# Patient Record
Sex: MALE | Race: WHITE | NOT HISPANIC OR LATINO | Employment: OTHER | URBAN - METROPOLITAN AREA
[De-identification: names, ages, dates, MRNs, and addresses within clinical notes are randomized per-mention and may not be internally consistent; named-entity substitution may affect disease eponyms.]

---

## 2017-01-30 RX ORDER — WARFARIN SODIUM 5 MG/1
TABLET ORAL
Qty: 90 TABLET | Refills: 0 | Status: SHIPPED | OUTPATIENT
Start: 2017-01-30 | End: 2017-05-01 | Stop reason: SDUPTHER

## 2017-04-30 RX ORDER — WARFARIN SODIUM 5 MG/1
TABLET ORAL
Qty: 90 TABLET | Refills: 0 | Status: CANCELLED | OUTPATIENT
Start: 2017-04-30

## 2017-05-01 RX ORDER — WARFARIN SODIUM 5 MG/1
TABLET ORAL
Qty: 90 TABLET | Refills: 0 | Status: SHIPPED | OUTPATIENT
Start: 2017-05-01 | End: 2017-07-31 | Stop reason: SDUPTHER

## 2017-06-09 ENCOUNTER — TRANSCRIBE ORDERS (OUTPATIENT)
Dept: ADMINISTRATIVE | Facility: HOSPITAL | Age: 77
End: 2017-06-09

## 2017-06-09 DIAGNOSIS — R10.9 FLANK PAIN: ICD-10-CM

## 2017-06-09 DIAGNOSIS — R63.4 LOSS OF WEIGHT: Primary | ICD-10-CM

## 2017-06-19 ENCOUNTER — HOSPITAL ENCOUNTER (OUTPATIENT)
Dept: CT IMAGING | Facility: HOSPITAL | Age: 77
Discharge: HOME | End: 2017-06-19
Admitting: FAMILY MEDICINE

## 2017-06-19 DIAGNOSIS — R63.4 LOSS OF WEIGHT: ICD-10-CM

## 2017-06-19 DIAGNOSIS — R10.9 FLANK PAIN: ICD-10-CM

## 2017-06-19 PROCEDURE — 74176 CT ABD & PELVIS W/O CONTRAST: CPT

## 2017-07-05 ENCOUNTER — TELEPHONE (OUTPATIENT)
Dept: CARDIOLOGY | Facility: CLINIC | Age: 77
End: 2017-07-05

## 2017-07-05 NOTE — TELEPHONE ENCOUNTER
When I called for pt's INR dosage his wife had a question about his warfarin. He will be having a colonoscopy coming up on 8/1/17 with Dr. Bergman at [facility]. She wanted to know about holding his warfarin and whether he need ed to bridge with Lovenox this time. She can be reached at #[phone]. Please advise.  Thanks,  Ines

## 2017-07-06 NOTE — TELEPHONE ENCOUNTER
Stop warfarin 5 days ahead and start lovenox 2 days prior to procedure if INR is <2 - pls call - must stay on Lovenox until INR is 2-3.

## 2017-07-07 NOTE — TELEPHONE ENCOUNTER
Called and left a message advising. JOSÉ. I told her to call back to make sure she understood.    Ines

## 2017-07-13 ENCOUNTER — TELEPHONE (OUTPATIENT)
Dept: CARDIOLOGY | Facility: CLINIC | Age: 77
End: 2017-07-13

## 2017-07-13 NOTE — TELEPHONE ENCOUNTER
----- Message from Donya Gagnon MD sent at 7/13/2017  3:34 PM EDT -----  Spoke with Dr. Bergman and he can do colonoscopy on coumadin - no need to reverse.  Maybe have him hold the day prior to the procedure.     RM

## 2017-07-13 NOTE — TELEPHONE ENCOUNTER
S/w pt's wife they rather want the pt to get admit to have the bridge/heparin in the hospital. They really can't afford the co py for lovenox. Can you put the order in......      Thanks  Nirmala PARIKH

## 2017-07-31 RX ORDER — WARFARIN SODIUM 5 MG/1
TABLET ORAL
Qty: 90 TABLET | Refills: 0 | Status: SHIPPED | OUTPATIENT
Start: 2017-07-31 | End: 2017-08-14

## 2017-08-14 RX ORDER — WARFARIN SODIUM 5 MG/1
5 TABLET ORAL
COMMUNITY
End: 2017-11-02 | Stop reason: SDUPTHER

## 2017-08-14 RX ORDER — ASCORBIC ACID 500 MG
1000 TABLET ORAL AS NEEDED
COMMUNITY
End: 2018-11-21 | Stop reason: ALTCHOICE

## 2017-08-15 ENCOUNTER — ANESTHESIA (OUTPATIENT)
Dept: GASTROENTEROLOGY | Facility: HOSPITAL | Age: 77
End: 2017-08-15

## 2017-08-15 ENCOUNTER — HOSPITAL ENCOUNTER (OUTPATIENT)
Facility: HOSPITAL | Age: 77
Setting detail: HOSPITAL OUTPATIENT SURGERY
Discharge: HOME | End: 2017-08-15
Attending: SURGERY | Admitting: SURGERY

## 2017-08-15 ENCOUNTER — ANESTHESIA EVENT (OUTPATIENT)
Dept: GASTROENTEROLOGY | Facility: HOSPITAL | Age: 77
End: 2017-08-15

## 2017-08-15 VITALS
BODY MASS INDEX: 20.47 KG/M2 | WEIGHT: 146.19 LBS | DIASTOLIC BLOOD PRESSURE: 80 MMHG | HEIGHT: 71 IN | OXYGEN SATURATION: 100 % | SYSTOLIC BLOOD PRESSURE: 124 MMHG | RESPIRATION RATE: 18 BRPM | HEART RATE: 64 BPM | TEMPERATURE: 98.4 F

## 2017-08-15 PROCEDURE — 25010000002 PROPOFOL 10 MG/ML EMULSION: Performed by: ANESTHESIOLOGY

## 2017-08-15 RX ORDER — SODIUM CHLORIDE, SODIUM LACTATE, POTASSIUM CHLORIDE, CALCIUM CHLORIDE 600; 310; 30; 20 MG/100ML; MG/100ML; MG/100ML; MG/100ML
1000 INJECTION, SOLUTION INTRAVENOUS CONTINUOUS PRN
Status: DISCONTINUED | OUTPATIENT
Start: 2017-08-15 | End: 2017-08-15 | Stop reason: HOSPADM

## 2017-08-15 RX ORDER — PROPOFOL 10 MG/ML
VIAL (ML) INTRAVENOUS AS NEEDED
Status: DISCONTINUED | OUTPATIENT
Start: 2017-08-15 | End: 2017-08-15 | Stop reason: SURG

## 2017-08-15 RX ORDER — PROPOFOL 10 MG/ML
VIAL (ML) INTRAVENOUS CONTINUOUS PRN
Status: DISCONTINUED | OUTPATIENT
Start: 2017-08-15 | End: 2017-08-15 | Stop reason: SURG

## 2017-08-15 RX ORDER — LIDOCAINE HYDROCHLORIDE 20 MG/ML
INJECTION, SOLUTION INFILTRATION; PERINEURAL AS NEEDED
Status: DISCONTINUED | OUTPATIENT
Start: 2017-08-15 | End: 2017-08-15 | Stop reason: SURG

## 2017-08-15 RX ADMIN — PROPOFOL 50 MG: 10 INJECTION, EMULSION INTRAVENOUS at 10:46

## 2017-08-15 RX ADMIN — SODIUM CHLORIDE, POTASSIUM CHLORIDE, SODIUM LACTATE AND CALCIUM CHLORIDE: 600; 310; 30; 20 INJECTION, SOLUTION INTRAVENOUS at 10:45

## 2017-08-15 RX ADMIN — PROPOFOL 100 MCG/KG/MIN: 10 INJECTION, EMULSION INTRAVENOUS at 10:46

## 2017-08-15 RX ADMIN — SODIUM CHLORIDE, POTASSIUM CHLORIDE, SODIUM LACTATE AND CALCIUM CHLORIDE 1000 ML: 600; 310; 30; 20 INJECTION, SOLUTION INTRAVENOUS at 10:29

## 2017-08-15 RX ADMIN — LIDOCAINE HYDROCHLORIDE 60 MG: 20 INJECTION, SOLUTION INFILTRATION; PERINEURAL at 10:45

## 2017-08-15 NOTE — DISCHARGE INSTRUCTIONS
For the next 24 hours patient needs to be with a responsible adult.    For 24 hours DO NOT drive, operate machinery, appliances, drink alcohol, make important decisions or sign legal documents.    Start with a light or bland diet and advance to regular diet as tolerated.    Follow recommendations on procedure report provided by your doctor.    Call Dr guerrero* for problems [phone]    Problems may include but not limited to: large amounts of bleeding, trouble breathing, repeated vomiting, severe unrelieved pain, fever or chills.

## 2017-08-15 NOTE — OP NOTE
Procedure Note    Bobby L Tapley  1940  [id number]  8/15/2017  8/15/2017      Pre-operative Diagnosis: abnormal CT    Post-operative Diagnosis: internal hemorrhoids    Procedure: Colonoscopy   Surgeon:  Hu Bergman M.D,  [email or web address].    Sedation: MAC    Procedure Details:  Informed consent was obtained for the procedure, including sedation.  Risks of perforation, hemorrhage, adverse drug reaction and aspiration were discussed.The patient was monitored continuously with ECG tracing, pulse oximetry, blood pressure monitoring, and direct observations. The patient was placed in the left lateral decubitus position. The patient was given intravenous sedation by anesthesia.     A digital rectal examination was performed.  The variable-stiffness pediatric colonoscope was inserted into the rectum and advanced under direct vision to the cecum, which was identified by the ileocecal valve and appendiceal orifice.  The quality of the colonic preparation was good. A retroflexed view of the right colon was normal.     Careful inspection was made as the colonoscope was withdrawn, including a retroflexed view of the rectum; findings and interventions are described below.  Digital exam of the rectum was performed which revealed no abnormalities.     Colonoscopy findings:  1.  Diverticulosis  was mild  2.  Prominent internal hemorrhoids  3.  Normal appearing ascending colon    Specimens:      * No specimens in log *      Complications: none    Recommendations:  -Follow up with primary care physician.

## 2017-08-15 NOTE — ANESTHESIA POSTPROCEDURE EVALUATION
"Patient: Bobby L Tapley    Procedure Summary     Date Anesthesia Start Anesthesia Stop Room / Location    08/15/17 1043 1113  [facility]       Procedure Diagnosis Surgeon Provider    COLONOSCOPY (N/A ) No diagnosis on file. MD Medhat Cox MD          Anesthesia Type: MAC  Last vitals  BP   112/64 (08/15/17 1128)    Temp   36.9 °C (98.4 °F) (08/15/17 1108)    Pulse   66 (08/15/17 1128)   Resp   18 (08/15/17 1128)    SpO2   97 % (08/15/17 1128)      Post Anesthesia Care and Evaluation    Patient location during evaluation: PACU  Patient participation: complete - patient participated  Level of consciousness: awake  Pain score: 0  Pain management: adequate  Airway patency: patent  Anesthetic complications: No anesthetic complications    Cardiovascular status: acceptable  Respiratory status: acceptable  Hydration status: acceptable    Comments: Blood pressure 112/64, pulse 66, temperature 36.9 °C (98.4 °F), temperature source Oral, resp. rate 18, height 71\" (180.3 cm), weight 146 lb 3 oz (66.3 kg), SpO2 97 %.        "

## 2017-08-15 NOTE — ANESTHESIA PREPROCEDURE EVALUATION
Anesthesia Evaluation     Patient summary reviewed and Nursing notes reviewed          Airway   Mallampati: I  TM distance: <3 FB  Neck ROM: full  no difficulty expected  Dental - normal exam     Pulmonary - negative pulmonary ROS and normal exam   Cardiovascular - normal exam    (+) valvular problems/murmurs, CAD, CABG, PVD, hyperlipidemia      Neuro/Psych- negative ROS  GI/Hepatic/Renal/Endo - negative ROS     Musculoskeletal     Abdominal  - normal exam    Bowel sounds: normal.   Substance History - negative use     OB/GYN negative ob/gyn ROS         Other   (+) arthritis                                     Anesthesia Plan    ASA 3     MAC     Anesthetic plan and risks discussed with patient.

## 2017-08-15 NOTE — H&P
Date: 8/15/2017     Patient: Bobby L Tapley    : 1940   [id number]       History:   The patient is a 77 y.o. male of Serenity Adam MD  who presents for colonoscopy. The patient currently has no complaints.  He  denies any history of nausea, abdominal pain, weight loss, change in bowel habits or rectal bleeding.   Family history is negative for for colon cancer or polyps.    He garcia had recent weight loss.  CT showed a thickened right colon     The patient has had a colonoscopy in the past- several years ago.      The following portions of the patient's history were reviewed and updated as appropriate: allergies, current medications, past family history, past medical history, past social history, past surgical history and problem list.    Past Medical History:   Diagnosis Date   • Aneurysm of aortic arch    • Aortic insufficiency    • Aortic stenosis    • Arthritis    • Ascending aortic aneurysm    • CAD (coronary artery disease)    • Enlarged prostate    • H/O aortic valve replacement    • H/O echocardiogram 02/10/2015   • Health care maintenance    • History of EKG 2015   • Hyperlipidemia    • Hypertension    • Kidney stones       Past Surgical History:   Procedure Laterality Date   • AORTIC VALVE REPAIR/REPLACEMENT     • CARDIAC CATHETERIZATION  2013   • CORONARY ARTERY BYPASS GRAFT     • ROTATOR CUFF REPAIR       Medications:   Prescriptions Prior to Admission   Medication Sig Dispense Refill Last Dose   • aspirin 81 MG tablet Take 81 mg by mouth Daily.   2017 at Unknown time   • enalapril (VASOTEC) 2.5 MG tablet Take 2.5 mg by mouth Daily.   2017 at Unknown time   • finasteride (PROSCAR) 5 MG tablet Take 5 mg by mouth Daily.   2017 at Unknown time   • HYDROcodone-acetaminophen (NORCO) 5-325 MG per tablet Take 1 tablet by mouth as needed.   2017 at Unknown time   • L-Lysine 500 MG capsule Take 1 capsule by mouth As Needed.   8/15/2017 at Unknown time   • Multiple  Vitamins-Minerals (CENTRUM SILVER) tablet Take 1 tablet by mouth Daily.   Past Week at Unknown time   • vitamin C (ASCORBIC ACID) 500 MG tablet Take 1,000 mg by mouth Daily.   Past Week at Unknown time   • warfarin (COUMADIN) 5 MG tablet Take 5 mg by mouth Daily.   8/13/2017      Allergies: Review of patient's allergies indicates no known allergies.   Social History:  Social History     Social History   • Marital status:      Spouse name: N/A   • Number of children: N/A   • Years of education: N/A     Social History Main Topics   • Smoking status: Never Smoker   • Smokeless tobacco: None      Comment: caffeine use   • Alcohol use No   • Drug use: No   • Sexual activity: Defer     Other Topics Concern   • None     Social History Narrative        Family History   Problem Relation Age of Onset   • Cancer Mother    • Aneurysm Father    • Heart disease Father    • Aneurysm Brother    • Heart disease Brother    • Malig Hyperthermia Neg Hx           Review of Systems:   Negative  ros    Physical Examination:  General - well developed  male in no distress.  HEENT - pupils are equal, conjunctiva are pink, sclera are non-icteric.  Mouth - mucous membranes are moist. Speech is normal with no hoarseness.  Neck - supple, no adenopathy or masses, no JVD.  Chest - clear.  Heart - regular rate and rhythm.  Abomen - soft, non-tender, non-distended, no masses or hernias.   Ext - no edema or cyanosis, capillary refill brisk.    Impression:  77 y.o. male for  colonoscopy.  Abnormal CT gi tract    Plan:  colonoscopy.  Generalities of the procedure were described.  Risks of bleeding and/or bowel perforation were discussed.      Signature: No name on file.     CC: Serenity Adam MD

## 2017-09-26 ENCOUNTER — OFFICE VISIT (OUTPATIENT)
Dept: CARDIOLOGY | Facility: CLINIC | Age: 77
End: 2017-09-26

## 2017-09-26 VITALS
BODY MASS INDEX: 21.14 KG/M2 | HEIGHT: 71 IN | SYSTOLIC BLOOD PRESSURE: 130 MMHG | HEART RATE: 70 BPM | DIASTOLIC BLOOD PRESSURE: 70 MMHG | WEIGHT: 151 LBS

## 2017-09-26 DIAGNOSIS — R06.83 SNORING: ICD-10-CM

## 2017-09-26 DIAGNOSIS — Z95.2 H/O AORTIC VALVE REPLACEMENT: ICD-10-CM

## 2017-09-26 DIAGNOSIS — I10 ESSENTIAL HYPERTENSION: ICD-10-CM

## 2017-09-26 DIAGNOSIS — I25.10 CORONARY ARTERY DISEASE INVOLVING NATIVE CORONARY ARTERY OF NATIVE HEART WITHOUT ANGINA PECTORIS: Primary | ICD-10-CM

## 2017-09-26 DIAGNOSIS — E78.5 HYPERLIPIDEMIA, UNSPECIFIED HYPERLIPIDEMIA TYPE: ICD-10-CM

## 2017-09-26 DIAGNOSIS — Z95.1 HX OF CABG: ICD-10-CM

## 2017-09-26 DIAGNOSIS — R53.83 OTHER FATIGUE: ICD-10-CM

## 2017-09-26 PROCEDURE — 93000 ELECTROCARDIOGRAM COMPLETE: CPT | Performed by: INTERNAL MEDICINE

## 2017-09-26 PROCEDURE — 99214 OFFICE O/P EST MOD 30 MIN: CPT | Performed by: INTERNAL MEDICINE

## 2017-09-26 NOTE — PROGRESS NOTES
Date of Office Visit: 2017  Encounter Provider: Donya Gagnon MD  Place of Service: [facility] CARDIOLOGY  Patient Name: Bobby L Tapley  :1940      Patient ID:  Bobby L Tapley is a 77 y.o. male is here for  followup for CAD and h/o mechanical AVR.         History of Present Illness     In  he underwent replacement of his aortic valve, getting a mechanical aortic valve.   He also received saphenous vein graft to LAD, replacement of the ascending and transverse  aortic arch with reimplantation of the coronary arteries onto the arch. I believe this  was all due to coronary stenosis. He had a CT of the chest performed in 2013 which  showed chronic aortic dissection as described by radiology.       He had an echocardiogram which was done in 2013 which revealed a normally functioning  mechanical aortic valve. His ejection fraction was 60% with normal LV size and no LVH.   He had a cardiac catheterization which was done in 2012 which revealed a patent LAD,  patent left main, patent circumflex vessel, patent RCA and occluded saphenous vein graft  to LAD. I do have his stress nuclear perfusion study which was performed in 2012 prior  to the catheterization which showed a small amount of inferior wall ischemia but no ST  changes on that. I do have his operative report from [facility] dated 2004. What  they found intraoperatively was 50-60% mid LAD stenosis, moderately severe aortic valve  stenosis, calcified bileaflet aortic valve and a 6 cm ascending aortic aneurysm as well as  dilation of the transverse thoracic aorta. He underwent a Bentall procedure and received  a #23 Carbomedics aortic valve with conduit. He had 30 mm woven Hemashield gold graft  used to replace the transverse thoracic aorta. The internal mammary artery was not used  because it was small on inspection so he received a saphenous vein graft to the LAD. This  procedure was done by  Dr. Santana Walls.         He had a 2-D echocardiogram with Doppler done in 02/10/2015, showing an ejection fraction  of 65%, septal hypokinesis, mechanical type prosthetic valve, which was normal and trace  tricuspid insufficiency.       He has had increasing fatigue.  He snores heavily and his wife says she witnesses him not breathing at night.  He's had no chest pain but has some mild exertional dyspnea.  He's also recently had some gastroenteritis and is recovering from this.  He has no fevers.  He's had no tachycardia, dizziness or syncope.  He is taking his medications as directed.    Past Medical History:   Diagnosis Date   • Aneurysm of aortic arch    • Aortic insufficiency    • Aortic stenosis    • Arthritis    • Ascending aortic aneurysm    • CAD (coronary artery disease)    • Enlarged prostate    • H/O aortic valve replacement    • H/O echocardiogram 02/10/2015   • Health care maintenance    • History of EKG 07/07/2015   • Hyperlipidemia    • Hypertension    • Kidney stones          Past Surgical History:   Procedure Laterality Date   • AORTIC VALVE REPAIR/REPLACEMENT     • CARDIAC CATHETERIZATION  11/27/2013   • COLONOSCOPY N/A 8/15/2017    Procedure: COLONOSCOPY;  Surgeon: Hu Bergman MD;  Location: [facility] ENDOSCOPY;  Service:    • CORONARY ARTERY BYPASS GRAFT     • ROTATOR CUFF REPAIR         Current Outpatient Prescriptions on File Prior to Visit   Medication Sig Dispense Refill   • aspirin 81 MG tablet Take 81 mg by mouth Daily.     • enalapril (VASOTEC) 2.5 MG tablet Take 2.5 mg by mouth Daily.     • finasteride (PROSCAR) 5 MG tablet Take 5 mg by mouth Daily.     • HYDROcodone-acetaminophen (NORCO) 5-325 MG per tablet Take 1 tablet by mouth as needed.     • L-Lysine 500 MG capsule Take 1 capsule by mouth As Needed.     • Multiple Vitamins-Minerals (CENTRUM SILVER) tablet Take 1 tablet by mouth Daily.     • vitamin C (ASCORBIC ACID) 500 MG tablet Take 1,000 mg by mouth Daily.     • warfarin  "(COUMADIN) 5 MG tablet Take 5 mg by mouth Daily.       No current facility-administered medications on file prior to visit.        Social History     Social History   • Marital status:      Spouse name: N/A   • Number of children: N/A   • Years of education: N/A     Occupational History   • Not on file.     Social History Main Topics   • Smoking status: Never Smoker   • Smokeless tobacco: Never Used   • Alcohol use No      Comment: caffeine use   • Drug use: No   • Sexual activity: Defer     Other Topics Concern   • Not on file     Social History Narrative           Review of Systems   Constitution: Negative.   HENT: Negative for congestion.    Eyes: Negative for vision loss in left eye and vision loss in right eye.   Respiratory: Negative.  Negative for cough, hemoptysis, shortness of breath, sleep disturbances due to breathing, snoring, sputum production and wheezing.    Endocrine: Negative.    Hematologic/Lymphatic: Negative.    Skin: Negative for poor wound healing and rash.   Musculoskeletal: Negative for falls, gout, muscle cramps and myalgias.   Gastrointestinal: Negative for abdominal pain, diarrhea, dysphagia, hematemesis, melena, nausea and vomiting.   Neurological: Negative for excessive daytime sleepiness, dizziness, headaches, light-headedness, loss of balance, seizures and vertigo.   Psychiatric/Behavioral: Negative for depression and substance abuse. The patient is not nervous/anxious.        Procedures    ECG 12 Lead  Date/Time: 9/26/2017 10:51 AM  Performed by: RICHA MARRUFO  Authorized by: RICHA MARRUFO   Comparison: compared with previous ECG   Rhythm: sinus rhythm  Conduction: LAFB  Other findings: PRWP  Clinical impression: abnormal ECG               Objective:      Vitals:    09/26/17 1045   BP: 130/70   BP Location: Right arm   Patient Position: Sitting   Pulse: 70   Weight: 151 lb (68.5 kg)   Height: 71\" (180.3 cm)     Body mass index is 21.06 kg/(m^2).    Physical Exam "   Constitutional: He is oriented to person, place, and time. He appears well-developed and well-nourished. No distress.   HENT:   Head: Normocephalic and atraumatic.   Eyes: Conjunctivae are normal. No scleral icterus.   Neck: Neck supple. No JVD present. Carotid bruit is not present. No thyromegaly present.   Cardiovascular: Normal rate, regular rhythm, S1 normal, S2 normal, normal heart sounds and intact distal pulses.   No extrasystoles are present. PMI is not displaced.  Exam reveals no gallop.    No murmur heard.  Pulses:       Carotid pulses are 2+ on the right side, and 2+ on the left side.       Radial pulses are 2+ on the right side, and 2+ on the left side.        Dorsalis pedis pulses are 2+ on the right side, and 2+ on the left side.        Posterior tibial pulses are 2+ on the right side, and 2+ on the left side.   Mechanical click   Pulmonary/Chest: Effort normal and breath sounds normal. No respiratory distress. He has no wheezes. He has no rhonchi. He has no rales. He exhibits no tenderness.   Abdominal: Soft. Bowel sounds are normal. He exhibits no distension, no abdominal bruit and no mass. There is no tenderness.   Musculoskeletal: He exhibits no edema or deformity.   Lymphadenopathy:     He has no cervical adenopathy.   Neurological: He is alert and oriented to person, place, and time. No cranial nerve deficit.   Skin: Skin is warm and dry. No rash noted. He is not diaphoretic. No cyanosis. No pallor. Nails show no clubbing.   Psychiatric: He has a normal mood and affect. Judgment normal.   Vitals reviewed.      Lab Review:       Assessment:      Diagnosis Plan   1. Coronary artery disease involving native coronary artery of native heart without angina pectoris     2. Hx of CABG     3. H/O aortic valve replacement     4. Essential hypertension     5. Hyperlipidemia, unspecified hyperlipidemia type       1. Status post mechanical aortic valve replacement for a bicuspid aortic valve with  severe  stenosis and large ascending aortic aneurysm. This was done in 02/2004. He received a 23  mm Carbomedics mechanical aortic valve with a conduit. His thoracic aorta was repaired  with 30 mm woven Hemashield gold graft and his ascending aortic aneurysm was also  repaired. He received a saphenous vein graft to LAD at that time. He then had a cardiac  catheterization in 2012 which revealed the saphenous vein graft to the LAD was occluded  but his coronary arteries were all patent. At this time he has no active angina. His echocardiogram from 2/2015, shows normal function of his  mechanical aortic valve.   2. Normal ejection fraction of 60%.   3. Hypertension under fair control.   4. Hyperlipidemia. The patient is on no statin therapy for this. His lipids are well controlled.   5. Renal calculi; stable.         Plan:       Refers sleep medicine for snoring and daytime somnolence.  Set up an echocardiogram for fatigue and exertional dyspnea.  No med changes, see back in one year.    Coronary Artery Disease  Assessment  • The patient has no angina    Subjective - Objective  • There is a history of previous coronary artery bypass graft  • Current antiplatelet therapy includes aspirin 81 mg

## 2017-10-04 ENCOUNTER — TELEPHONE (OUTPATIENT)
Dept: CARDIOLOGY | Facility: HOSPITAL | Age: 77
End: 2017-10-04

## 2017-10-09 ENCOUNTER — HOSPITAL ENCOUNTER (OUTPATIENT)
Dept: CARDIOLOGY | Facility: HOSPITAL | Age: 77
Discharge: HOME | End: 2017-10-09
Attending: INTERNAL MEDICINE | Admitting: INTERNAL MEDICINE

## 2017-10-09 VITALS
BODY MASS INDEX: 21.14 KG/M2 | WEIGHT: 151 LBS | SYSTOLIC BLOOD PRESSURE: 122 MMHG | DIASTOLIC BLOOD PRESSURE: 58 MMHG | HEIGHT: 71 IN | HEART RATE: 58 BPM | OXYGEN SATURATION: 95 %

## 2017-10-09 DIAGNOSIS — Z95.1 HX OF CABG: ICD-10-CM

## 2017-10-09 DIAGNOSIS — Z95.2 H/O AORTIC VALVE REPLACEMENT: ICD-10-CM

## 2017-10-09 DIAGNOSIS — I25.10 CORONARY ARTERY DISEASE INVOLVING NATIVE CORONARY ARTERY OF NATIVE HEART WITHOUT ANGINA PECTORIS: ICD-10-CM

## 2017-10-09 DIAGNOSIS — I71.21 ANEURYSM, ASCENDING AORTA (HCC): Primary | ICD-10-CM

## 2017-10-09 DIAGNOSIS — I71.21 ASCENDING AORTIC ANEURYSM (HCC): Primary | ICD-10-CM

## 2017-10-09 LAB
BH CV ECHO MEAS - AO ACC TIME: 0.08 SEC
BH CV ECHO MEAS - AO MAX PG (FULL): 10 MMHG
BH CV ECHO MEAS - AO MAX PG: 24.7 MMHG
BH CV ECHO MEAS - AO MEAN PG (FULL): 6.5 MMHG
BH CV ECHO MEAS - AO MEAN PG: 14.6 MMHG
BH CV ECHO MEAS - AO ROOT AREA (BSA CORRECTED): 2.2
BH CV ECHO MEAS - AO ROOT AREA: 12.8 CM^2
BH CV ECHO MEAS - AO ROOT DIAM: 4 CM
BH CV ECHO MEAS - AO V2 MAX: 248.4 CM/SEC
BH CV ECHO MEAS - AO V2 MEAN: 179 CM/SEC
BH CV ECHO MEAS - AO V2 VTI: 49.8 CM
BH CV ECHO MEAS - AVA(I,A): 1.1 CM^2
BH CV ECHO MEAS - AVA(I,D): 1.1 CM^2
BH CV ECHO MEAS - AVA(V,A): 1.1 CM^2
BH CV ECHO MEAS - AVA(V,D): 1.1 CM^2
BH CV ECHO MEAS - BSA(HAYCOCK): 1.8 M^2
BH CV ECHO MEAS - BSA: 1.9 M^2
BH CV ECHO MEAS - BZI_BMI: 20.9 KILOGRAMS/M^2
BH CV ECHO MEAS - BZI_METRIC_HEIGHT: 180.3 CM
BH CV ECHO MEAS - BZI_METRIC_WEIGHT: 68 KG
BH CV ECHO MEAS - CONTRAST EF (2CH): 60.8 ML/M^2
BH CV ECHO MEAS - CONTRAST EF 4CH: 62.3 ML/M^2
BH CV ECHO MEAS - EDV(MOD-SP2): 51 ML
BH CV ECHO MEAS - EDV(MOD-SP4): 77 ML
BH CV ECHO MEAS - EDV(TEICH): 81.4 ML
BH CV ECHO MEAS - EF(CUBED): 71.6 %
BH CV ECHO MEAS - EF(MOD-SP2): 60.8 %
BH CV ECHO MEAS - EF(MOD-SP4): 62.3 %
BH CV ECHO MEAS - EF(TEICH): 63.6 %
BH CV ECHO MEAS - ESV(MOD-SP2): 20 ML
BH CV ECHO MEAS - ESV(MOD-SP4): 29 ML
BH CV ECHO MEAS - ESV(TEICH): 29.7 ML
BH CV ECHO MEAS - FS: 34.2 %
BH CV ECHO MEAS - IVS/LVPW: 0.89
BH CV ECHO MEAS - IVSD: 0.96 CM
BH CV ECHO MEAS - LAT PEAK E' VEL: 9 CM/SEC
BH CV ECHO MEAS - LV DIASTOLIC VOL/BSA (35-75): 41.3 ML/M^2
BH CV ECHO MEAS - LV MASS(C)D: 144.1 GRAMS
BH CV ECHO MEAS - LV MASS(C)DI: 77.2 GRAMS/M^2
BH CV ECHO MEAS - LV MAX PG: 14.7 MMHG
BH CV ECHO MEAS - LV MEAN PG: 8 MMHG
BH CV ECHO MEAS - LV SYSTOLIC VOL/BSA (12-30): 15.5 ML/M^2
BH CV ECHO MEAS - LV V1 MAX: 191.5 CM/SEC
BH CV ECHO MEAS - LV V1 MEAN: 131.7 CM/SEC
BH CV ECHO MEAS - LV V1 VTI: 38.5 CM
BH CV ECHO MEAS - LVIDD: 4.3 CM
BH CV ECHO MEAS - LVIDS: 2.8 CM
BH CV ECHO MEAS - LVLD AP2: 6.4 CM
BH CV ECHO MEAS - LVLD AP4: 8.2 CM
BH CV ECHO MEAS - LVLS AP2: 5.9 CM
BH CV ECHO MEAS - LVLS AP4: 6.5 CM
BH CV ECHO MEAS - LVOT AREA (M): 1.5 CM^2
BH CV ECHO MEAS - LVOT AREA: 1.5 CM^2
BH CV ECHO MEAS - LVOT DIAM: 1.4 CM
BH CV ECHO MEAS - LVPWD: 1.1 CM
BH CV ECHO MEAS - MED PEAK E' VEL: 6 CM/SEC
BH CV ECHO MEAS - MV A DUR: 0.12 SEC
BH CV ECHO MEAS - MV A MAX VEL: 109.6 CM/SEC
BH CV ECHO MEAS - MV DEC SLOPE: 446.3 CM/SEC^2
BH CV ECHO MEAS - MV DEC TIME: 0.19 SEC
BH CV ECHO MEAS - MV E MAX VEL: 75.2 CM/SEC
BH CV ECHO MEAS - MV E/A: 0.69
BH CV ECHO MEAS - MV MAX PG: 5 MMHG
BH CV ECHO MEAS - MV MEAN PG: 1.9 MMHG
BH CV ECHO MEAS - MV P1/2T MAX VEL: 78.1 CM/SEC
BH CV ECHO MEAS - MV P1/2T: 51.2 MSEC
BH CV ECHO MEAS - MV V2 MAX: 111.3 CM/SEC
BH CV ECHO MEAS - MV V2 MEAN: 64 CM/SEC
BH CV ECHO MEAS - MV V2 VTI: 32 CM
BH CV ECHO MEAS - MVA P1/2T LCG: 2.8 CM^2
BH CV ECHO MEAS - MVA(P1/2T): 4.3 CM^2
BH CV ECHO MEAS - MVA(VTI): 1.7 CM^2
BH CV ECHO MEAS - PA ACC TIME: 0.13 SEC
BH CV ECHO MEAS - PA MAX PG (FULL): 3.1 MMHG
BH CV ECHO MEAS - PA MAX PG: 6.2 MMHG
BH CV ECHO MEAS - PA PR(ACCEL): 20.4 MMHG
BH CV ECHO MEAS - PA V2 MAX: 124.9 CM/SEC
BH CV ECHO MEAS - PULM A REVS DUR: 0.13 SEC
BH CV ECHO MEAS - PULM A REVS VEL: 34.6 CM/SEC
BH CV ECHO MEAS - PULM DIAS VEL: 39.6 CM/SEC
BH CV ECHO MEAS - PULM S/D: 1.1
BH CV ECHO MEAS - PULM SYS VEL: 45 CM/SEC
BH CV ECHO MEAS - PVA(V,A): 2.9 CM^2
BH CV ECHO MEAS - PVA(V,D): 2.9 CM^2
BH CV ECHO MEAS - QP/QS: 1.4
BH CV ECHO MEAS - RV MAX PG: 3.2 MMHG
BH CV ECHO MEAS - RV MEAN PG: 1.5 MMHG
BH CV ECHO MEAS - RV V1 MAX: 88.8 CM/SEC
BH CV ECHO MEAS - RV V1 MEAN: 56.5 CM/SEC
BH CV ECHO MEAS - RV V1 VTI: 18.7 CM
BH CV ECHO MEAS - RVOT AREA: 4.1 CM^2
BH CV ECHO MEAS - RVOT DIAM: 2.3 CM
BH CV ECHO MEAS - SI(AO): 340.8 ML/M^2
BH CV ECHO MEAS - SI(CUBED): 29.7 ML/M^2
BH CV ECHO MEAS - SI(LVOT): 30 ML/M^2
BH CV ECHO MEAS - SI(MOD-SP2): 16.6 ML/M^2
BH CV ECHO MEAS - SI(MOD-SP4): 25.7 ML/M^2
BH CV ECHO MEAS - SI(TEICH): 27.7 ML/M^2
BH CV ECHO MEAS - SV(AO): 636 ML
BH CV ECHO MEAS - SV(CUBED): 55.5 ML
BH CV ECHO MEAS - SV(LVOT): 56 ML
BH CV ECHO MEAS - SV(MOD-SP2): 31 ML
BH CV ECHO MEAS - SV(MOD-SP4): 48 ML
BH CV ECHO MEAS - SV(RVOT): 77.3 ML
BH CV ECHO MEAS - SV(TEICH): 51.8 ML
BH CV XLRA - RV BASE: 2.5 CM
BH CV XLRA - TDI S': 7 CM/SEC
E/E' RATIO: 11
LEFT ATRIUM VOLUME INDEX: 24 ML/M2
LEFT ATRIUM VOLUME: 45 CM3
LV EF 2D ECHO EST: 62 %

## 2017-10-09 PROCEDURE — 25010000002 PERFLUTREN (DEFINITY) 8.476 MG IN SODIUM CHLORIDE 0.9 % 10 ML INJECTION: Performed by: INTERNAL MEDICINE

## 2017-10-09 PROCEDURE — 93306 TTE W/DOPPLER COMPLETE: CPT

## 2017-10-09 PROCEDURE — 93306 TTE W/DOPPLER COMPLETE: CPT | Performed by: INTERNAL MEDICINE

## 2017-10-09 RX ADMIN — PERFLUTREN 1.5 ML: 6.52 INJECTION, SUSPENSION INTRAVENOUS at 08:33

## 2017-10-10 ENCOUNTER — TELEPHONE (OUTPATIENT)
Dept: CARDIAC SURGERY | Facility: CLINIC | Age: 77
End: 2017-10-10

## 2017-10-10 NOTE — TELEPHONE ENCOUNTER
Pt was referred by Dr. Gagnon for aneurysm. I called the pt to notify him that we cannot schedule him until his testing ordered by Dr. Gagnon is complete. His wife understood and verbalized that as soon as they have his testing scheduled, they will call the office back to schedule and appointment to see Dr. Cox.

## 2017-10-11 ENCOUNTER — TELEPHONE (OUTPATIENT)
Dept: CARDIOLOGY | Facility: CLINIC | Age: 77
End: 2017-10-11

## 2017-10-11 NOTE — TELEPHONE ENCOUNTER
Pt's wife is calling re about pt echo test last 10/9/17....she can be reached at [phone]....Nirmala PARIKH

## 2017-10-16 ENCOUNTER — HOSPITAL ENCOUNTER (OUTPATIENT)
Dept: CT IMAGING | Facility: HOSPITAL | Age: 77
Discharge: HOME | End: 2017-10-16
Attending: INTERNAL MEDICINE | Admitting: INTERNAL MEDICINE

## 2017-10-16 PROCEDURE — 0 IOPAMIDOL PER 1 ML: Performed by: INTERNAL MEDICINE

## 2017-10-16 PROCEDURE — 82565 ASSAY OF CREATININE: CPT

## 2017-10-16 PROCEDURE — 71275 CT ANGIOGRAPHY CHEST: CPT

## 2017-10-16 RX ADMIN — IOPAMIDOL 95 ML: 755 INJECTION, SOLUTION INTRAVENOUS at 07:30

## 2017-10-31 ENCOUNTER — OFFICE VISIT (OUTPATIENT)
Dept: CARDIAC SURGERY | Facility: CLINIC | Age: 77
End: 2017-10-31

## 2017-10-31 VITALS
HEART RATE: 71 BPM | OXYGEN SATURATION: 94 % | RESPIRATION RATE: 20 BRPM | DIASTOLIC BLOOD PRESSURE: 70 MMHG | WEIGHT: 148 LBS | HEIGHT: 71 IN | SYSTOLIC BLOOD PRESSURE: 112 MMHG | BODY MASS INDEX: 20.72 KG/M2 | TEMPERATURE: 98.3 F

## 2017-10-31 DIAGNOSIS — Z95.1 HX OF CABG: Primary | ICD-10-CM

## 2017-10-31 DIAGNOSIS — Z95.2 H/O AORTIC VALVE REPLACEMENT: ICD-10-CM

## 2017-10-31 DIAGNOSIS — Z98.890 S/P ASCENDING AORTIC ANEURYSM REPAIR: ICD-10-CM

## 2017-10-31 DIAGNOSIS — I10 ESSENTIAL HYPERTENSION: ICD-10-CM

## 2017-10-31 DIAGNOSIS — I25.10 CORONARY ARTERY DISEASE INVOLVING NATIVE CORONARY ARTERY OF NATIVE HEART WITHOUT ANGINA PECTORIS: ICD-10-CM

## 2017-10-31 DIAGNOSIS — Z86.79 S/P ASCENDING AORTIC ANEURYSM REPAIR: ICD-10-CM

## 2017-10-31 PROCEDURE — 99204 OFFICE O/P NEW MOD 45 MIN: CPT | Performed by: THORACIC SURGERY (CARDIOTHORACIC VASCULAR SURGERY)

## 2017-10-31 RX ORDER — ENALAPRIL MALEATE 2.5 MG/1
2.5 TABLET ORAL DAILY
COMMUNITY
Start: 2017-09-27 | End: 2021-10-12 | Stop reason: HOSPADM

## 2017-10-31 RX ORDER — HYDROCODONE BITARTRATE AND ACETAMINOPHEN 5; 325 MG/1; MG/1
1 TABLET ORAL
COMMUNITY
Start: 2017-10-20 | End: 2017-10-31 | Stop reason: SDUPTHER

## 2017-10-31 NOTE — PROGRESS NOTES
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 He is a 77 y.o. male with a history of 6 cm ascending aortic aneurysm who underwent a multivessel CABG, mechanical AV conduit, and ascending aorta and tiffani-arch replacement with a graft in the year 2004 by Dr. 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 He denies any chest or upper back pain, dyspnea, orthopnea, TIA, embolic events, stroke, hoarseness or anticoagulation related problems he had a recent echocardiogram that showed a normal functioning aortic valve, mechanical type and good ejection fraction and no other problems. His chest CT showed showed a 4.7 cm in my measurements 4.4 cm mid aortic arch aneurysm at the level of the tiffani-arch anastomosis, no false aneurysm, tapering to a mild dilatation in the proximal descending thoracic aorta and am not sure there is a true dissection in the aortic arch patch visit is one in my opinion is a chronic one.  He has done very well with anticoagulation and reports no complications    Patient Active Problem List   Diagnosis   • H/O aortic valve replacement   • BP (high blood pressure)   • HLD (hyperlipidemia)   • Coronary artery disease involving native coronary artery of native heart without angina pectoris   • Hx of CABG   • S/P ascending aortic aneurysm repair       Past Medical History:   Diagnosis Date   • Aneurysm of aortic arch    • Aortic insufficiency    • Aortic stenosis    • Arthritis    • Ascending aortic aneurysm    • CAD (coronary artery disease)    • Enlarged prostate    • H/O aortic valve replacement    • H/O echocardiogram 02/10/2015   • Health care  maintenance    • History of EKG 07/07/2015   • Hyperlipidemia    • Hypertension    • Kidney stones        Past Surgical History:   Procedure Laterality Date   • AORTIC VALVE REPAIR/REPLACEMENT     • CARDIAC CATHETERIZATION  11/27/2013   • COLONOSCOPY N/A 8/15/2017    Procedure: COLONOSCOPY;  Surgeon: Hu Bergman MD;  Location: [facility] ENDOSCOPY;  Service:    • CORONARY ARTERY BYPASS GRAFT     • ROTATOR CUFF REPAIR         No Known Allergies      Current Outpatient Prescriptions:   •  aspirin 81 MG tablet, Take 81 mg by mouth Daily., Disp: , Rfl:   •  enalapril (VASOTEC) 2.5 MG tablet, Take 2.5 mg by mouth., Disp: , Rfl:   •  finasteride (PROSCAR) 5 MG tablet, Take 5 mg by mouth Daily., Disp: , Rfl:   •  HYDROcodone-acetaminophen (NORCO) 5-325 MG per tablet, Take 1 tablet by mouth as needed., Disp: , Rfl:   •  L-Lysine 500 MG capsule, Take 1 capsule by mouth As Needed., Disp: , Rfl:   •  Multiple Vitamins-Minerals (CENTRUM SILVER) tablet, Take 1 tablet by mouth Daily., Disp: , Rfl:   •  vitamin C (ASCORBIC ACID) 500 MG tablet, Take 1,000 mg by mouth Daily., Disp: , Rfl:   •  warfarin (COUMADIN) 5 MG tablet, Take 5 mg by mouth Daily., Disp: , Rfl:     Social History     Social History   • Marital status:      Spouse name: N/A   • Number of children: N/A   • Years of education: N/A     Occupational History   • Not on file.     Social History Main Topics   • Smoking status: Never Smoker   • Smokeless tobacco: Never Used   • Alcohol use No      Comment: caffeine use   • Drug use: No   • Sexual activity: Defer     Other Topics Concern   • Not on file     Social History Narrative       Family History   Problem Relation Age of Onset   • Cancer Mother    • Aneurysm Father    • Heart disease Father    • Aneurysm Brother    • Heart disease Brother    • Malig Hyperthermia Neg Hx      Review of Systems   Constitutional: Negative for fatigue.   Gastrointestinal: Negative for anal bleeding and blood in stool.    Genitourinary: Negative for dysuria, flank pain and hematuria.   Neurological: Negative for dizziness, syncope and weakness.   All other systems reviewed and are negative.         Physical Exam:    Vital Signs:  Weight: 148 lb (67.1 kg)   Body mass index is 20.64 kg/(m^2).  Temp: 98.3 °F (36.8 °C)   Heart Rate: 71   BP: 112/70     Physical Exam   Constitutional: He is oriented to person, place, and time. He appears well-developed and well-nourished.   HENT:   Head: Normocephalic and atraumatic.   Nose: Nose normal.   Mouth/Throat: Oropharynx is clear and moist.   Eyes: Conjunctivae are normal. Pupils are equal, round, and reactive to light.   Neck: Normal range of motion. Neck supple. No JVD present. No thyromegaly present.   Cardiovascular: Normal rate, regular rhythm, S1 normal, S2 normal and intact distal pulses.  Exam reveals no gallop and no friction rub.    Murmur heard.  Pulses:       Radial pulses are 2+ on the right side, and 2+ on the left side.        Dorsalis pedis pulses are 2+ on the right side, and 2+ on the left side.   Mechanical aortic sounds, 2/6 systolic murmur in aortic area   Pulmonary/Chest: Effort normal and breath sounds normal. He has no rales.   Abdominal: Soft. Bowel sounds are normal. He exhibits no distension and no mass. There is no tenderness.   Musculoskeletal: Normal range of motion. He exhibits no tenderness or deformity.   Lymphadenopathy:     He has no cervical adenopathy.   Neurological: He is alert and oriented to person, place, and time. He has normal reflexes.   Skin: Skin is warm and dry. No rash noted. No erythema.   Psychiatric: He has a normal mood and affect. His behavior is normal.    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    Relevant studies (other than HPI)        Assessment:     Problem List Items Addressed This Visit        Cardiovascular and Mediastinum    BP (high blood pressure)    Relevant Medications    enalapril  (VASOTEC) 2.5 MG tablet    Coronary artery disease involving native coronary artery of native heart without angina pectoris       Other    H/O aortic valve replacement    Hx of CABG - Primary    S/P ascending aortic aneurysm repair        Moderate size residual aortic arch and proximal descending thoracic aorta aneurysm/asymptomatic  A status post proximal aortic replacement and aortic repair  Hypertension is well controlled with meds  Anticoagulation therapy well adjusted    Recommendation/Plan:     I recommend a chest CTA and office visit in one year and a strict blood pressure control.  I discussed with him that if starting chest pain or other problem arises he should be evaluated in the ER at [facility]    Thank you for allowing me to participate in his care.    Regards,    Mendez Cox M.D.

## 2017-11-02 RX ORDER — WARFARIN SODIUM 5 MG/1
TABLET ORAL
Qty: 90 TABLET | Refills: 0 | Status: SHIPPED | OUTPATIENT
Start: 2017-11-02 | End: 2018-01-31 | Stop reason: SDUPTHER

## 2017-11-09 LAB — CREAT BLDA-MCNC: 0.7 MG/DL (ref 0.6–1.3)

## 2017-12-04 ENCOUNTER — OFFICE VISIT (OUTPATIENT)
Dept: SLEEP MEDICINE | Facility: HOSPITAL | Age: 77
End: 2017-12-04
Attending: INTERNAL MEDICINE

## 2017-12-04 VITALS
HEIGHT: 69 IN | WEIGHT: 152 LBS | DIASTOLIC BLOOD PRESSURE: 70 MMHG | HEART RATE: 78 BPM | SYSTOLIC BLOOD PRESSURE: 119 MMHG | BODY MASS INDEX: 22.51 KG/M2

## 2017-12-04 DIAGNOSIS — R63.4 WEIGHT LOSS: ICD-10-CM

## 2017-12-04 DIAGNOSIS — R06.81 WITNESSED EPISODE OF APNEA: ICD-10-CM

## 2017-12-04 DIAGNOSIS — G47.30 HYPERSOMNIA WITH SLEEP APNEA: Primary | ICD-10-CM

## 2017-12-04 DIAGNOSIS — I25.10 CORONARY ARTERY DISEASE INVOLVING NATIVE CORONARY ARTERY OF NATIVE HEART WITHOUT ANGINA PECTORIS: ICD-10-CM

## 2017-12-04 DIAGNOSIS — R06.83 SNORING: ICD-10-CM

## 2017-12-04 DIAGNOSIS — Z95.2 H/O AORTIC VALVE REPLACEMENT: ICD-10-CM

## 2017-12-04 DIAGNOSIS — G47.33 OSA (OBSTRUCTIVE SLEEP APNEA): ICD-10-CM

## 2017-12-04 DIAGNOSIS — G47.10 HYPERSOMNIA WITH SLEEP APNEA: Primary | ICD-10-CM

## 2017-12-04 PROBLEM — N40.0 BPH (BENIGN PROSTATIC HYPERPLASIA): Status: ACTIVE | Noted: 2017-12-04

## 2017-12-04 PROBLEM — E78.5 DYSLIPIDEMIA: Status: ACTIVE | Noted: 2017-12-04

## 2017-12-04 PROBLEM — G56.00 CTS (CARPAL TUNNEL SYNDROME): Status: ACTIVE | Noted: 2017-12-04

## 2017-12-04 PROBLEM — H91.90 HIGH FREQUENCY HEARING LOSS: Status: ACTIVE | Noted: 2017-12-04

## 2017-12-04 PROBLEM — M19.90 OSTEOARTHRITIS: Status: ACTIVE | Noted: 2017-12-04

## 2017-12-04 PROBLEM — N20.0 NEPHROLITHIASIS: Status: ACTIVE | Noted: 2017-12-04

## 2017-12-04 PROBLEM — G89.29 CHRONIC HEADACHES: Status: ACTIVE | Noted: 2017-12-04

## 2017-12-04 PROBLEM — R51.9 CHRONIC HEADACHES: Status: ACTIVE | Noted: 2017-12-04

## 2017-12-04 PROCEDURE — G0463 HOSPITAL OUTPT CLINIC VISIT: HCPCS

## 2017-12-04 NOTE — PROGRESS NOTES
Sleep Consultation    Patient Name: Bobby L Tapley  Age/Sex: 77 y.o. male  : 1940  MRN: [id number]    Date of Encounter Visit: 2017  Encounter Provider: Reji Blanco MD  Referring Provider: Donya Gagnon MD  Place of Service: [facility]  Patient Care Team:  Serenity GERMAN MD as PCP - General (Family Medicine)    Subjective:     Reason for Consult: snoring and witness apnea events    History of Present Illness:  Bobby L Tapley is a 77 y.o. male is here for evaluation of JOY. He was being evaluated for unexplained weight loss and his wife reported that he snores and has had witness apnea events at night. He has non-refreshing sleep and excessive daytime sleepiness with nocturia. He had an aortic valve replacement with a [place] on anticoagulation and post operatively he developed some dysphagia and may have some esophageal stenosis. He has lost 20 pounds in 1 year    Please see sleep questionnaire on page 2 for more details.   Patient complains of daytime fatigue and sleepiness with an [place] Sleepiness Scale (ESS) of 12.  Patient complains of snoring loudly and has had witnessed apnea events by his wife. He does have complaints of waking up gasping for breath at night, coughing, choking or with respiratory discomfort. He has morning headaches and awakens with sore throat or dry mouth.    He has typical symptoms of restless legs with complaints of leg jerking and a creepy crawling discomfort that improves with movement. The pain is bothersome while trying to go to sleep.   Patient denies any cataplexy, sleep paralysis or other symptoms to suggest narcolepsy. However, he does complain of sudden episodes of sleep during the day.   Patient denies any parasomnias.  Denies any history of seizure disorder or recent head trauma.  He has problems falling asleep and staying asleep at night with overall restless sleep.  Patient spends adequate amount of time in bed with no  evidence of sleep restriction or improper sleep hygiene. He goes to bed at 12 or 1 am and wakes up at 6 am averaging 4-5 hours of sleep per night. He reports falling asleep within 30 minutes and wakes up feeling dizzy at times. He takes 3-4 naps per day.   Comorbidities include: hypertension, hyperlipidemia, CAD s/p CABG and mechanical AVR on anticoagulation.    Review of Systems:   Positive for Frequent urination, ear pain, nasal congestion, postnasal drip, painful joints or muscles, poor appetite, shortness of breath, depression, problems swallowing, changes in nails and always too warm.  A twelve-system review was conducted and was otherwise negative.   Please refer to page 4 of on the sleep questionnaire for more details on system review findings.    Past Medical History:  Past Medical History:   Diagnosis Date   • Aneurysm of aortic arch    • Aortic insufficiency    • Aortic stenosis    • Arthritis    • Ascending aortic aneurysm    • CAD (coronary artery disease)    • Enlarged prostate    • H/O aortic valve replacement    • H/O echocardiogram 02/10/2015   • Health care maintenance    • History of EKG 07/07/2015   • Hyperlipidemia    • Hypertension    • Kidney stones        Past Surgical History:   Procedure Laterality Date   • AORTIC VALVE REPAIR/REPLACEMENT     • CARDIAC CATHETERIZATION  11/27/2013   • COLONOSCOPY N/A 8/15/2017    Procedure: COLONOSCOPY;  Surgeon: Hu Bergman MD;  Location: [facility];  Service:    • CORONARY ARTERY BYPASS GRAFT     • ROTATOR CUFF REPAIR         Home Medications:     Current Outpatient Prescriptions:   •  aspirin 81 MG tablet, Take 81 mg by mouth Daily., Disp: , Rfl:   •  enalapril (VASOTEC) 2.5 MG tablet, Take 2.5 mg by mouth., Disp: , Rfl:   •  finasteride (PROSCAR) 5 MG tablet, Take 5 mg by mouth Daily., Disp: , Rfl:   •  HYDROcodone-acetaminophen (NORCO) 5-325 MG per tablet, Take 1 tablet by mouth as needed., Disp: , Rfl:   •  L-Lysine 500 MG capsule, Take 1  "capsule by mouth As Needed., Disp: , Rfl:   •  Multiple Vitamins-Minerals (CENTRUM SILVER) tablet, Take 1 tablet by mouth Daily., Disp: , Rfl:   •  vitamin C (ASCORBIC ACID) 500 MG tablet, Take 1,000 mg by mouth Daily., Disp: , Rfl:   •  warfarin (COUMADIN) 5 MG tablet, TAKE 1 TABLET BY MOUTH DAILY OR AS DIRECTED, Disp: 90 tablet, Rfl: 0    Allergies:  No Known Allergies    Past Social History:  Social History     Social History   • Marital status:      Spouse name: N/A   • Number of children: N/A   • Years of education: N/A     Occupational History   • retired      Social History Main Topics   • Smoking status: Never Smoker   • Smokeless tobacco: Never Used   • Alcohol use No      Comment: caffeine use   • Drug use: No   • Sexual activity: Defer     Other Topics Concern   • None     Social History Narrative       Past Family History:  Family History   Problem Relation Age of Onset   • Cancer Mother    • Aneurysm Father    • Heart disease Father    • Aneurysm Brother    • Heart disease Brother    • Hypertension Cousin    • Obesity Cousin    • Lung cancer Cousin    • Malig Hyperthermia Neg Hx          Objective:   Done and documented on sleep disorders center physical examination sheet, please refer to the hand written note on records for details about the pertinent negative findings  Vital Signs:   Visit Vitals   • /70 (BP Location: Left arm, Patient Position: Sitting)   • Pulse 78   • Ht 175.3 cm (69\")   • Wt 68.9 kg (152 lb)   • BMI 22.45 kg/m2     Wt Readings from Last 3 Encounters:   12/04/17 68.9 kg (152 lb)   10/31/17 67.1 kg (148 lb)   10/09/17 68.5 kg (151 lb)     Neck Circumference: 15.5 inches    Physical Exam:   General: AAOx3. Normal mood and affect.   HEENT:  Conjunctiva normal.  PERRLA.  Moist mucous membranes.  Septum midline. Mallampati 2 airway. normal tongue. Mildly enlarged uvula.    Neck: Neck supple. Trachea midline.  Normal thyroid.  LUNGS: Non-labored breathing. CTAB.  No " wheezing.  HEART: regular rate and rhythm. Positive early systolic 2/6 murmur and S2 metallic click.  EXTREMITIES: no edema. No cyanosis or clubbing. Normal gait.    Diagnostic Data:  No prior testing.    Assessment and Plan:       ICD-10-CM ICD-9-CM   1. Hypersomnia with sleep apnea G47.10 780.53    G47.30    2. JOY (obstructive sleep apnea) G47.33 327.23   3. Weight loss R63.4 783.21   4. Snoring R06.83 786.09   5. Witnessed episode of apnea R06.81 786.03   6. Coronary artery disease involving native coronary artery of native heart without angina pectoris I25.10 414.01   7. H/O aortic valve replacement Z95.2 V43.3       Recommendations:     Patient has typical symptoms of obstructive sleep apnea with witnessed apneic events, fatigue and excessive daytime sleepiness. Given the typical symptoms and comorbidities patient requires further evaluation of possible underlying obstructive sleep apnea.    He does have weight loss with history of esophageal stenosis per wife and would consider GI evaluation for dysphagia with possible EGD or esophagram.    Patient was educated in depth about JOY and cardiovascular consequences if left untreated, including but not limited to CHF, CAD, arrhythmias, CVA, and/or HTN. Education also provided about the diagnostic tools for JOY, including the polysomnography and the treatment modalities, including the CPAP. Adherence to the CPAP is a key factor in successful treatment of JOY and the patient was encouraged to contact us in case of problem with the CPAP or the mask that can limit the tolerance of the compliance with the therapy.    Perform an in lab sleep study with split night titration if AHI greater than or equal to 5. Will follow up post testing to discuss treatment options.     Orders Placed This Encounter   Procedures   • Polysomnography 4 or More Parameters With CPAP       Return for Follow up after study.    Reji Blanco MD   [facility]   12/28/17  4:14  AM    EMR Dragon/Transcription disclaimer:   Much of this encounter note is an electronic transcription/translation of spoken language to printed text. The electronic translation of spoken language may permit erroneous, or at times, nonsensical words or phrases to be inadvertently transcribed; Although I have reviewed the note for such errors, some may still exist.

## 2018-01-31 RX ORDER — WARFARIN SODIUM 5 MG/1
TABLET ORAL
Qty: 90 TABLET | Refills: 0 | Status: SHIPPED | OUTPATIENT
Start: 2018-01-31 | End: 2018-05-04 | Stop reason: SDUPTHER

## 2018-02-15 DIAGNOSIS — I71.21 ASCENDING AORTIC ANEURYSM (HCC): Primary | ICD-10-CM

## 2018-02-26 ENCOUNTER — HOSPITAL ENCOUNTER (OUTPATIENT)
Dept: SLEEP MEDICINE | Facility: HOSPITAL | Age: 78
Discharge: HOME | End: 2018-02-26
Attending: INTERNAL MEDICINE | Admitting: INTERNAL MEDICINE

## 2018-02-26 VITALS — BODY MASS INDEX: 22.51 KG/M2 | HEIGHT: 69 IN | WEIGHT: 152 LBS

## 2018-02-26 DIAGNOSIS — R06.83 SNORING: ICD-10-CM

## 2018-02-26 DIAGNOSIS — Z95.2 H/O AORTIC VALVE REPLACEMENT: ICD-10-CM

## 2018-02-26 DIAGNOSIS — G47.10 HYPERSOMNIA WITH SLEEP APNEA: ICD-10-CM

## 2018-02-26 DIAGNOSIS — G47.33 OSA (OBSTRUCTIVE SLEEP APNEA): ICD-10-CM

## 2018-02-26 DIAGNOSIS — G47.30 HYPERSOMNIA WITH SLEEP APNEA: ICD-10-CM

## 2018-02-26 DIAGNOSIS — R63.4 WEIGHT LOSS: ICD-10-CM

## 2018-02-26 DIAGNOSIS — R06.81 WITNESSED EPISODE OF APNEA: ICD-10-CM

## 2018-02-26 DIAGNOSIS — I25.10 CORONARY ARTERY DISEASE INVOLVING NATIVE CORONARY ARTERY OF NATIVE HEART WITHOUT ANGINA PECTORIS: ICD-10-CM

## 2018-02-26 PROCEDURE — 95810 POLYSOM 6/> YRS 4/> PARAM: CPT

## 2018-03-06 ENCOUNTER — TELEPHONE (OUTPATIENT)
Dept: SLEEP MEDICINE | Facility: HOSPITAL | Age: 78
End: 2018-03-06

## 2018-03-06 NOTE — TELEPHONE ENCOUNTER
Spoke with pt's wife.  They would like to see  before receiving pap equipment.  Scheduled f/u with dr Blanco

## 2018-03-12 ENCOUNTER — OFFICE VISIT (OUTPATIENT)
Dept: SLEEP MEDICINE | Facility: HOSPITAL | Age: 78
End: 2018-03-12
Attending: INTERNAL MEDICINE

## 2018-03-12 VITALS
SYSTOLIC BLOOD PRESSURE: 135 MMHG | WEIGHT: 149 LBS | DIASTOLIC BLOOD PRESSURE: 79 MMHG | BODY MASS INDEX: 20.86 KG/M2 | HEIGHT: 71 IN | HEART RATE: 90 BPM

## 2018-03-12 DIAGNOSIS — Z95.1 HX OF CABG: ICD-10-CM

## 2018-03-12 DIAGNOSIS — G47.33 OSA (OBSTRUCTIVE SLEEP APNEA): Primary | ICD-10-CM

## 2018-03-12 DIAGNOSIS — I10 BENIGN ESSENTIAL HTN: ICD-10-CM

## 2018-03-12 DIAGNOSIS — G47.34 SLEEP RELATED HYPOXIA: ICD-10-CM

## 2018-03-12 DIAGNOSIS — G47.30 HYPERSOMNIA WITH SLEEP APNEA: ICD-10-CM

## 2018-03-12 DIAGNOSIS — Z95.2 H/O AORTIC VALVE REPLACEMENT: ICD-10-CM

## 2018-03-12 DIAGNOSIS — G47.10 HYPERSOMNIA WITH SLEEP APNEA: ICD-10-CM

## 2018-03-12 PROCEDURE — G0463 HOSPITAL OUTPT CLINIC VISIT: HCPCS

## 2018-03-12 RX ORDER — FINASTERIDE 5 MG/1
5 TABLET, FILM COATED ORAL DAILY
COMMUNITY
Start: 2017-12-19

## 2018-03-12 RX ORDER — HYDROCODONE BITARTRATE AND ACETAMINOPHEN 5; 325 MG/1; MG/1
1 TABLET ORAL
COMMUNITY
Start: 2018-02-19 | End: 2018-11-21 | Stop reason: SDUPTHER

## 2018-03-13 ENCOUNTER — TELEPHONE (OUTPATIENT)
Dept: SLEEP MEDICINE | Facility: HOSPITAL | Age: 78
End: 2018-03-13

## 2018-03-13 NOTE — TELEPHONE ENCOUNTER
Tech called pt and informed sent DME order to [facility]. Pt answered and confirmed and confirmed F/U appt that was already scheduled. MAB

## 2018-03-13 NOTE — PROGRESS NOTES
Sleep Disorder Follow Up    Patient Name: Bobby L Tapley  Age/Sex: 78 y.o. male  : 1940  MRN: [id number]    Date of Encounter Visit:    Referring Provider: Reji Blanco MD  Place of Service: [facility]  Patient Care Team:  Serenity GERMNA MD as PCP - General (Family Medicine)    PROBLEM LIST:  1. Moderate JOY   2. Sleep related hypoxia  3. Hx of CAD s/p CABG   4. HTN  5. Valvular heart disease s/p AVR  6. Dysphagia   7. Weight loss  8. Nocturia    History of Present Illness:  Bobby L Tapley is a 78 y.o. male who was last seen in the office on 17 for initial evaluation for suspected JOY with symptoms of snoring, witness apneas, non-refreshing sleep, nocturia and excessive daytime sleepiness. He had a sleep study on 18 that showed mild to moderate JOY with an overall AHI of 14.3 and RDI 22.3. Patient had mild sleep related hypoxia with mckinley desaturation of 78.9% with 8.9 minute spent at less than 89%.   Since last visit, patient continues to have symptoms, but wanted to discuss results before starting treatment. Auto CPAP 6-20 cmH20 was recommended.   [place] Sleepiness Scale (ESS) is 10.  Weight is down 3 pounds since last visit.  Other comorbidities include hypertension, hyperlipidemia, CAD s/p CABG and mechanical AVR on anticoagulation.    Review of Systems:   A ten-system review was conducted and was negative except for depression and shortness of breath.   Please refer to the follow up sleep questionnaire page one for details.    Past Medical History:  Past medical, surgical, social, and family history, except as mentioned above, was unchanged from the last visit.     Past Medical History:   Diagnosis Date   • Aneurysm of aortic arch    • Aortic insufficiency    • Aortic stenosis    • Arthritis    • Ascending aortic aneurysm    • CAD (coronary artery disease)    • Enlarged prostate    • H/O aortic valve replacement    • H/O echocardiogram  02/10/2015   • Health care maintenance    • History of EKG 07/07/2015   • Hyperlipidemia    • Hypertension    • Kidney stones        Past Surgical History:   Procedure Laterality Date   • AORTIC VALVE REPAIR/REPLACEMENT     • CARDIAC CATHETERIZATION  11/27/2013   • COLONOSCOPY N/A 8/15/2017    Procedure: COLONOSCOPY;  Surgeon: Hu Bergman MD;  Location: [facility] ENDOSCOPY;  Service:    • CORONARY ARTERY BYPASS GRAFT     • ROTATOR CUFF REPAIR         Home Medications:     Current Outpatient Prescriptions:   •  finasteride (PROSCAR) 5 MG tablet, Take 5 mg by mouth., Disp: , Rfl:   •  HYDROcodone-acetaminophen (NORCO) 5-325 MG per tablet, Take 1 tablet by mouth., Disp: , Rfl:   •  aspirin 81 MG tablet, Take 81 mg by mouth Daily., Disp: , Rfl:   •  enalapril (VASOTEC) 2.5 MG tablet, Take 2.5 mg by mouth., Disp: , Rfl:   •  HYDROcodone-acetaminophen (NORCO) 5-325 MG per tablet, Take 1 tablet by mouth as needed., Disp: , Rfl:   •  L-Lysine 500 MG capsule, Take 1 capsule by mouth As Needed., Disp: , Rfl:   •  Multiple Vitamins-Minerals (CENTRUM SILVER) tablet, Take 1 tablet by mouth Daily., Disp: , Rfl:   •  vitamin C (ASCORBIC ACID) 500 MG tablet, Take 1,000 mg by mouth Daily., Disp: , Rfl:   •  warfarin (COUMADIN) 5 MG tablet, TAKE ONE TABLET BY MOUTH DAILY OR AS DIRECTED, Disp: 90 tablet, Rfl: 0    Allergies:  No Known Allergies    Past Social History:  Social History     Social History   • Marital status:      Occupational History   • retired      Social History Main Topics   • Smoking status: Never Smoker   • Smokeless tobacco: Never Used   • Alcohol use No      Comment: caffeine use   • Drug use: No   • Sexual activity: Defer     Other Topics Concern   • Not on file       Past Family History:  Family History   Problem Relation Age of Onset   • Cancer Mother    • Aneurysm Father    • Heart disease Father    • Aneurysm Brother    • Heart disease Brother    • Hypertension Cousin    • Obesity Cousin    • Lung  "cancer Cousin    • Malmadelin Hyperthermia Neg Hx          Objective:   Done and documented on sleep disorders center physical examination sheet, please refer to hand written note on the chart for details about the other pertinent negative findings.    Vital Signs:   Visit Vitals  /79   Pulse 90   Ht 180.3 cm (71\")   Wt 67.6 kg (149 lb)   BMI 20.78 kg/m²     Wt Readings from Last 3 Encounters:   03/12/18 67.6 kg (149 lb)   02/26/18 68.9 kg (152 lb)   12/04/17 68.9 kg (152 lb)          Physical Exam:   General: AAOx3. Normal mood and affect.   HEENT:  Moist mucous membranes.  Septum midline. Mallampati 2 airway.    LUNGS: Non-labored breathing. CTAB. No wheezes.  HEART: Regular rate and rhythm. 2/6 murmur. S2 click.  EXTREMITIES: no edema. No cyanosis or clubbing. Normal gait    Diagnostic Data:  Polysomnography on 12/4/17 showed mild to moderate JOY with overall AHI of 14.3 and RDI of 22.3. Chad desaturation of 78.9% with 8.9 minutes spent below 89%. Arousal index 32.3.  Latency to sleep onset was 11.2 minutes and latency to REM was 71.5 minutes.  Patient had 3 cycles of REM non-REM sleep that were  by a long stretch of WASO. no significant REM component, patient was in the supine position throughout. Severe sleep fragmentation with arousal index of 32.3 however most arousals were spontaneous  No significant periodic limb movement disorder. Recommended trial of auto CPAP 6-20 cmH20.       Assessment and Plan:       ICD-10-CM ICD-9-CM   1. JOY (obstructive sleep apnea) G47.33 327.23   2. Sleep related hypoxia G47.34 327.24   3. Hypersomnia with sleep apnea G47.10 780.53    G47.30    4. Hx of CABG Z95.1 V45.81   5. H/O aortic valve replacement Z95.2 V43.3   6. Benign essential HTN I10 401.1       Recommendations:     Reviewed sleep study results with patient in detail. He has a mild to moderate JOY with spontaneous arousals as well. Patient was educated about treatment modalities for mild to moderate JOY " and was recommended to trial CPAP first and patient is agreeable.     Will start auto CPAP 6-20 cmH20 and follow up with compliance download in 2 months.     Patient was educated in depth about JOY and cardiovascular consequences if left untreated, including but not limited to CHF, CAD, arrhythmias, CVA, and/or HTN. Adherence to the CPAP is a key factor in successful treatment of JOY and the patient was encouraged to contact us in case of problem with the CPAP or the mask that can limit the tolerance of the compliance with the therapy.     No orders of the defined types were placed in this encounter.    Return in about 2 months (around 5/12/2018).    Reji Blanco MD   [facility]   03/16/18  10:11 AM    Dictated utilizing Dragon dictation:  Much of this encounter note is an electronic transcription/translation of spoken language to printed text. The electronic translation of spoken language may permit erroneous, or at times, nonsensical words or phrases to be inadvertently transcribed; Although I have reviewed the note for such errors, some may still exist.

## 2018-05-07 RX ORDER — WARFARIN SODIUM 5 MG/1
TABLET ORAL
Qty: 90 TABLET | Refills: 0 | Status: SHIPPED | OUTPATIENT
Start: 2018-05-07 | End: 2018-07-31 | Stop reason: SDUPTHER

## 2018-05-14 ENCOUNTER — OFFICE VISIT (OUTPATIENT)
Dept: SLEEP MEDICINE | Facility: HOSPITAL | Age: 78
End: 2018-05-14
Attending: INTERNAL MEDICINE

## 2018-05-14 VITALS
BODY MASS INDEX: 20.18 KG/M2 | SYSTOLIC BLOOD PRESSURE: 117 MMHG | OXYGEN SATURATION: 94 % | WEIGHT: 149 LBS | HEART RATE: 57 BPM | HEIGHT: 72 IN | DIASTOLIC BLOOD PRESSURE: 75 MMHG

## 2018-05-14 DIAGNOSIS — Z99.89 OSA ON CPAP: Primary | ICD-10-CM

## 2018-05-14 DIAGNOSIS — G47.30 HYPERSOMNIA WITH SLEEP APNEA: ICD-10-CM

## 2018-05-14 DIAGNOSIS — G47.10 HYPERSOMNIA WITH SLEEP APNEA: ICD-10-CM

## 2018-05-14 DIAGNOSIS — G47.33 OSA ON CPAP: Primary | ICD-10-CM

## 2018-05-14 DIAGNOSIS — G47.34 SLEEP RELATED HYPOXIA: ICD-10-CM

## 2018-05-14 DIAGNOSIS — I25.10 CORONARY ARTERY DISEASE INVOLVING NATIVE CORONARY ARTERY OF NATIVE HEART WITHOUT ANGINA PECTORIS: ICD-10-CM

## 2018-05-14 PROBLEM — D17.30 SUBCUTANEOUS LIPOMA: Status: ACTIVE | Noted: 2018-03-27

## 2018-05-14 PROBLEM — F43.21 SITUATIONAL DEPRESSION: Status: ACTIVE | Noted: 2018-03-13

## 2018-05-14 PROCEDURE — G0463 HOSPITAL OUTPT CLINIC VISIT: HCPCS

## 2018-05-14 RX ORDER — MIRTAZAPINE 15 MG/1
15 TABLET, FILM COATED ORAL
COMMUNITY
Start: 2018-04-13 | End: 2018-11-21 | Stop reason: ALTCHOICE

## 2018-05-14 RX ORDER — MIRTAZAPINE 15 MG/1
TABLET, FILM COATED ORAL
COMMUNITY
Start: 2018-04-13 | End: 2018-05-14

## 2018-05-14 NOTE — PROGRESS NOTES
Sleep Disorder Follow Up    Patient Name: Bobby L Tapley  Age/Sex: 78 y.o. male  : 1940  MRN: [id number]    Date of Encounter Visit: 18   Referring Provider: Reji Blanco MD  Place of Service: [facility]  Patient Care Team:  Serenity GERMAN MD as PCP - General (Family Medicine)    PROBLEM LIST:  1. Moderate Obstructive sleep apnea  2. Sleep related hypoxia  3. Hx of CAD s/p CABG   4. HTN  5. Valvular heart disease s/p AVR  6. Dysphagia   7. Weight loss  8. Nocturia    History of Present Illness:  Bobby L Tapley is a 78 y.o. male who was last seen in the office on 3/12/18 for a history of moderate JOY, sleep related hypoxia, CAD, valvular heart disease and hypertension. He had a sleep study on 18 that showed mild to moderate JOY with an overall AHI of 14.3 and RDI 22.3. Patient had mild sleep related hypoxia with mckinley desaturation of 78.9% with 8.9 minute spent at less than 89%. He was started on auto CPAP 6-20 cmH20.     He is here for his initial compliance review after starting CPAP therapy. Since last visit, he reports he is going to sleep much quicker, but wakes up to go to the bathroom and keeps machine off after that.   Patient is on CPAP and uses it every night with no complaints from the mask or the pressure.  Patient uses a nasal pillow mask, which does fit well. Patient denies any air leak or dry mouth.   Patient's equipment supplier is [employer].  Patient sleeps better and has a deeper sleep with the CPAP and feels more energy during the day time.  Current CPAP setting 6-20 cm H20.  [place] Sleepiness Scale (ESS) is 11 up from 10 at last visit.  Weight is stable since last visit.  Other comorbidities include hypertension, hyperlipidemia, CAD s/p CABG and mechanical AVR on anticoagulation.    Review of Systems:   A ten-system review was conducted and was negative.  Please refer to the follow up sleep questionnaire page one for details.    Past Medical  History:  Past medical, surgical, social, and family history, except as mentioned above, was unchanged from the last visit.     Past Medical History:   Diagnosis Date   • Aneurysm of aortic arch    • Aortic insufficiency    • Aortic stenosis    • Arthritis    • Ascending aortic aneurysm    • CAD (coronary artery disease)    • Enlarged prostate    • H/O aortic valve replacement    • H/O echocardiogram 02/10/2015   • Health care maintenance    • History of EKG 07/07/2015   • Hyperlipidemia    • Hypertension    • Kidney stones        Past Surgical History:   Procedure Laterality Date   • AORTIC VALVE REPAIR/REPLACEMENT     • CARDIAC CATHETERIZATION  11/27/2013   • COLONOSCOPY N/A 8/15/2017    Procedure: COLONOSCOPY;  Surgeon: Hu Bergman MD;  Location: [facility] ENDOSCOPY;  Service:    • CORONARY ARTERY BYPASS GRAFT     • ROTATOR CUFF REPAIR         Home Medications:     Current Outpatient Prescriptions:   •  mirtazapine (REMERON) 15 MG tablet, Take 15 mg by mouth., Disp: , Rfl:   •  aspirin 81 MG tablet, Take 81 mg by mouth Daily., Disp: , Rfl:   •  enalapril (VASOTEC) 2.5 MG tablet, Take 2.5 mg by mouth., Disp: , Rfl:   •  finasteride (PROSCAR) 5 MG tablet, Take 5 mg by mouth., Disp: , Rfl:   •  HYDROcodone-acetaminophen (NORCO) 5-325 MG per tablet, Take 1 tablet by mouth as needed., Disp: , Rfl:   •  HYDROcodone-acetaminophen (NORCO) 5-325 MG per tablet, Take 1 tablet by mouth., Disp: , Rfl:   •  L-Lysine 500 MG capsule, Take 1 capsule by mouth As Needed., Disp: , Rfl:   •  Multiple Vitamins-Minerals (CENTRUM SILVER) tablet, Take 1 tablet by mouth Daily., Disp: , Rfl:   •  vitamin C (ASCORBIC ACID) 500 MG tablet, Take 1,000 mg by mouth Daily., Disp: , Rfl:   •  warfarin (COUMADIN) 5 MG tablet, TAKE ONE TABLET BY MOUTH DAILY OR AS DIRECTED, Disp: 90 tablet, Rfl: 0    Allergies:  No Known Allergies    Past Social History:  Social History     Social History   • Marital status:      Occupational History   •  "retired      Social History Main Topics   • Smoking status: Never Smoker   • Smokeless tobacco: Never Used   • Alcohol use No      Comment: caffeine use   • Drug use: No   • Sexual activity: Defer     Other Topics Concern   • Not on file       Past Family History:  Family History   Problem Relation Age of Onset   • Cancer Mother    • Aneurysm Father    • Heart disease Father    • Aneurysm Brother    • Heart disease Brother    • Hypertension Cousin    • Obesity Cousin    • Lung cancer Cousin    • Malig Hyperthermia Neg Hx          Objective:   Done and documented on sleep disorders center physical examination sheet, please refer to hand written note on the chart for details about the other pertinent negative findings.    Vital Signs:   Visit Vitals  /75 (BP Location: Left arm, Patient Position: Sitting)   Pulse 57   Ht 181.6 cm (71.5\")   Wt 67.6 kg (149 lb)   SpO2 94%   BMI 20.49 kg/m²     Wt Readings from Last 3 Encounters:   05/14/18 67.6 kg (149 lb)   03/12/18 67.6 kg (149 lb)   02/26/18 68.9 kg (152 lb)     Neck Circumference: 14 inches    Physical Exam:   General: AAOx3. Normal mood and affect.   HEENT:  Moist mucous membranes.  Septum midline. Mallampati 2 airway.    LUNGS: Non-labored breathing. CTAB. No wheezes.  HEART: Regular rate and rhythm. 2/6 murmur. Loud S2 click.  EXTREMITIES: no edema. No cyanosis or clubbing. Normal gait    Diagnostic Data:  Polysomnography on 12/4/17 showed mild to moderate JOY with overall AHI of 14.3 and RDI of 22.3. Chad desaturation of 78.9% with 8.9 minutes spent below 89%. Arousal index 32.3.  Latency to sleep onset was 11.2 minutes and latency to REM was 71.5 minutes.  Patient had 3 cycles of REM non-REM sleep that were  by a long stretch of WASO. no significant REM component, patient was in the supine position throughout. Severe sleep fragmentation with arousal index of 32.3 however most arousals were spontaneous.No significant periodic limb movement " disorder. Started on auto CPAP 6-20 cmH20.     Compliance download from 4/14/18 to 5/13/18 showed overall compliance of 86.7% with only 40% spent at greater than 4 hours use per night with average use of 3 hours and 39 minutes on the nights used.  Residual AHI 4.6 on auto CPAP 6-20 cm H2O with 1 minute and 30 seconds every time spent with large air leak.  Majority of the time (90%) pressures staying at her below 7.0 cm H2O with a mean pressure of 6.0 centimeters H2O.    Assessment and Plan:       ICD-10-CM ICD-9-CM   1. JOY on CPAP G47.33 327.23    Z99.89 V46.8   2. Hypersomnia with sleep apnea G47.10 780.53    G47.30    3. Sleep related hypoxia G47.34 327.24   4. Coronary artery disease involving native coronary artery of native heart without angina pectoris I25.10 414.01       Recommendations:   Patient is consistent with the use of the CPAP most of the night, but is unable to keep it on all not long mainly because of his nocturia.    Proper instruction and advice were provided to help the patient overcome this obstacle and we did discuss the requirement from Medicare standpoint in order to be able to continue to use his CPAP and have it covered under Medicare.    Patient has no significant problem with obesity so weight loss is not at goal.  Re-education provided about the impact of untreated sleep apnea, it is slightly frustrating since he does not feel any better on the CPAP, but he has not been consistent with that yet to get to the point.    will follow-up in 4 weeks. no adjustment on the pressure needed at this point and we will continue with the CPAP and with the same mask with the instruction to keep the mask on at night when he uses the bathroom and disconnected from the CPAP tube.    Further recommendation will depend on the compliance download on the response to the treatment on the follow-up reevaluation      No orders of the defined types were placed in this encounter.    Return in about 4 weeks  (around 6/11/2018).    Reji Blanco MD   [facility]   05/19/18  7:52 PM    Dictated utilizing Dragon dictation:  Much of this encounter note is an electronic transcription/translation of spoken language to printed text. The electronic translation of spoken language may permit erroneous, or at times, nonsensical words or phrases to be inadvertently transcribed; Although I have reviewed the note for such errors, some may still exist.

## 2018-06-11 ENCOUNTER — OFFICE VISIT (OUTPATIENT)
Dept: SLEEP MEDICINE | Facility: HOSPITAL | Age: 78
End: 2018-06-11
Attending: INTERNAL MEDICINE

## 2018-06-11 VITALS
SYSTOLIC BLOOD PRESSURE: 119 MMHG | BODY MASS INDEX: 20.86 KG/M2 | HEIGHT: 71 IN | WEIGHT: 149 LBS | DIASTOLIC BLOOD PRESSURE: 77 MMHG | HEART RATE: 91 BPM

## 2018-06-11 DIAGNOSIS — I10 BENIGN ESSENTIAL HTN: ICD-10-CM

## 2018-06-11 DIAGNOSIS — Z95.2 MECHANICAL HEART VALVE PRESENT: ICD-10-CM

## 2018-06-11 DIAGNOSIS — G47.33 OSA ON CPAP: Primary | ICD-10-CM

## 2018-06-11 DIAGNOSIS — G47.34 SLEEP RELATED HYPOXIA: ICD-10-CM

## 2018-06-11 DIAGNOSIS — Z99.89 OSA ON CPAP: Primary | ICD-10-CM

## 2018-06-11 PROBLEM — M79.604 LEG PAIN, RIGHT: Status: ACTIVE | Noted: 2018-05-17

## 2018-06-11 PROCEDURE — G0463 HOSPITAL OUTPT CLINIC VISIT: HCPCS

## 2018-06-11 NOTE — PROGRESS NOTES
Sleep Disorder Follow Up    Patient Name: Bobby L Tapley  Age/Sex: 78 y.o. male  : 1940  MRN: [id number]    Date of Encounter Visit: 18   Referring Provider: Reji Blanco MD  Place of Service: [facility]  Patient Care Team:  Serenity GERMAN MD as PCP - General (Family Medicine)    PROBLEM LIST:  1. Moderate Obstructive sleep apnea  2. Sleep related hypoxia  3. Hx of CAD s/p CABG   4. HTN  5. Valvular heart disease s/p AVR  6. Dysphagia   7. Weight loss  8. Nocturia    History of Present Illness:  Bobby L Tapley is a 78 y.o. male who was last seen in the office on 18 for a history of moderate JOY, sleep related hypoxia, CAD, valvular heart disease and hypertension. He had a sleep study on 18 that showed mild to moderate JOY with an overall AHI of 14.3 and RDI 22.3. Patient had mild sleep related hypoxia with mckinley desaturation of 78.9% with 8.9 minute spent at less than 89%. He was started on auto CPAP 6-20 cmH20.      He was noncompliant on his initial compliance review mostly due to nocturia and taking off the mask when going to the bathroom then forgetting to put it back on.  He is here for compliance review.     Since last visit, he has done a better job about keeping the mask on when he goes to the bathroom and disconnecting the mask from hose.  His compliance has almost doubled.   Patient is on CPAP and uses it every night with no complaints from the mask or the pressure.  Patient uses a nasal pillow mask, which does fit well. Patient denies any air leak. Reports some dry mouth  Patient's equipment supplier is [employer] and last mask change was about 3 months ago.  Patient sleeps better and has a deeper sleep with the CPAP and feels more energy during the day time.  Current CPAP setting 6-20 cm H20.  [place] Sleepiness Scale (ESS) is 15 up from 11 at last visit.  Weight is stable since last visit.  Other comorbidities include hypertension, hyperlipidemia, CAD  s/p CABG and mechanical AVR on anticoagulation.    Review of Systems:   A ten-system review was conducted and was negative except for anxiety.  Please refer to the follow up sleep questionnaire page one for details.    Past Medical History:  Past medical, surgical, social, and family history, except as mentioned above, was unchanged from the last visit.     Past Medical History:   Diagnosis Date   • Aneurysm of aortic arch    • Aortic insufficiency    • Aortic stenosis    • Arthritis    • Ascending aortic aneurysm    • CAD (coronary artery disease)    • Enlarged prostate    • H/O aortic valve replacement    • H/O echocardiogram 02/10/2015   • Health care maintenance    • History of EKG 07/07/2015   • Hyperlipidemia    • Hypertension    • Kidney stones        Past Surgical History:   Procedure Laterality Date   • AORTIC VALVE REPAIR/REPLACEMENT     • CARDIAC CATHETERIZATION  11/27/2013   • COLONOSCOPY N/A 8/15/2017    Procedure: COLONOSCOPY;  Surgeon: Hu Bergman MD;  Location: [facility] ENDOSCOPY;  Service:    • CORONARY ARTERY BYPASS GRAFT     • ROTATOR CUFF REPAIR         Home Medications:     Current Outpatient Prescriptions:   •  aspirin 81 MG tablet, Take 81 mg by mouth Daily., Disp: , Rfl:   •  enalapril (VASOTEC) 2.5 MG tablet, Take 2.5 mg by mouth., Disp: , Rfl:   •  finasteride (PROSCAR) 5 MG tablet, Take 5 mg by mouth., Disp: , Rfl:   •  HYDROcodone-acetaminophen (NORCO) 5-325 MG per tablet, Take 1 tablet by mouth as needed., Disp: , Rfl:   •  HYDROcodone-acetaminophen (NORCO) 5-325 MG per tablet, Take 1 tablet by mouth., Disp: , Rfl:   •  L-Lysine 500 MG capsule, Take 1 capsule by mouth As Needed., Disp: , Rfl:   •  mirtazapine (REMERON) 15 MG tablet, Take 15 mg by mouth., Disp: , Rfl:   •  Multiple Vitamins-Minerals (CENTRUM SILVER) tablet, Take 1 tablet by mouth Daily., Disp: , Rfl:   •  vitamin C (ASCORBIC ACID) 500 MG tablet, Take 1,000 mg by mouth Daily., Disp: , Rfl:   •  warfarin (COUMADIN) 5 MG  "tablet, TAKE ONE TABLET BY MOUTH DAILY OR AS DIRECTED, Disp: 90 tablet, Rfl: 0    Allergies:  No Known Allergies    Past Social History:  Social History     Social History   • Marital status:      Occupational History   • retired      Social History Main Topics   • Smoking status: Never Smoker   • Smokeless tobacco: Never Used   • Alcohol use No      Comment: caffeine use   • Drug use: No   • Sexual activity: Defer     Other Topics Concern   • Not on file       Past Family History:  Family History   Problem Relation Age of Onset   • Cancer Mother    • Aneurysm Father    • Heart disease Father    • Aneurysm Brother    • Heart disease Brother    • Hypertension Cousin    • Obesity Cousin    • Lung cancer Cousin    • Malig Hyperthermia Neg Hx          Objective:   Done and documented on sleep disorders center physical examination sheet, please refer to hand written note on the chart for details about the other pertinent negative findings.    Vital Signs:   Visit Vitals  /77   Pulse 91   Ht 180.3 cm (71\")   Wt 67.6 kg (149 lb)   BMI 20.78 kg/m²     Wt Readings from Last 3 Encounters:   06/11/18 67.6 kg (149 lb)   05/14/18 67.6 kg (149 lb)   03/12/18 67.6 kg (149 lb)          Physical Exam:   General: AAOx3. Normal mood and affect.   HEENT:  Moist mucous membranes.  Septum midline. Mallampati 2 airway.    LUNGS: Non-labored breathing. CTAB. No wheezes.  HEART: Regular rate and rhythm. 2/6 murmur. Loud S2 click.  EXTREMITIES: no edema. No cyanosis or clubbing. Normal gait    Diagnostic Data:  Polysomnography on 12/4/17 showed mild to moderate JOY with overall AHI of 14.3 and RDI of 22.3. Chad desaturation of 78.9% with 8.9 minutes spent below 89%. Arousal index 32.3.  Latency to sleep onset was 11.2 minutes and latency to REM was 71.5 minutes.  Patient had 3 cycles of REM non-REM sleep that were  by a long stretch of WASO. no significant REM component, patient was in the supine position throughout. " Severe sleep fragmentation with arousal index of 32.3 however most arousals were spontaneous.No significant periodic limb movement disorder. Started on auto CPAP 6-20 cmH20.     Compliance download from 5/12/18 to 6/10/18 showed overall compliance of 100% with average use of 7 hours and 11 minutes.  Residual AHI is 7.3 (CA 0.3, OA 0.3, HI 6.7, RERA 0.9) on auto CPAP 6-20 cm H2O with mean pressure of 6.2 cm H2O and 90th percentile pressure of 7.2 cm H2O.  Average time spent with large air leak was 43 minutes and 14 seconds per day.    Assessment and Plan:       ICD-10-CM ICD-9-CM   1. JOY on CPAP G47.33 327.23    Z99.89 V46.8   2. Sleep related hypoxia G47.34 327.24   3. Benign essential HTN I10 401.1   4. Mechanical heart valve present Z95.2 V43.3       Recommendations:     Patient is compliant with the CPAP and he is using it every time and is getting adequate benefit from it and it is recommended to be continued.    He does have some mild residual sleep apnea with AHI of 7.3 given his previous issues with the tolerance,  I would continue with the CPAP at the same pressure for the time being and will consider increasing the pressure slightly more on the next visit.     Patient is to follow-up with me on a yearly basis from now on or sooner as needed.    No orders of the defined types were placed in this encounter.    Return in about 1 year (around 6/11/2019).    Reji Blanco MD   [facility]   06/11/18  1:07 PM    Dictated utilizing Dragon dictation:  Much of this encounter note is an electronic transcription/translation of spoken language to printed text. The electronic translation of spoken language may permit erroneous, or at times, nonsensical words or phrases to be inadvertently transcribed; Although I have reviewed the note for such errors, some may still exist.

## 2018-07-31 RX ORDER — WARFARIN SODIUM 5 MG/1
TABLET ORAL
Qty: 90 TABLET | Refills: 0 | Status: SHIPPED | OUTPATIENT
Start: 2018-07-31 | End: 2018-09-26 | Stop reason: SDUPTHER

## 2018-09-26 ENCOUNTER — OFFICE VISIT (OUTPATIENT)
Dept: CARDIOLOGY | Facility: CLINIC | Age: 78
End: 2018-09-26

## 2018-09-26 VITALS
HEART RATE: 62 BPM | WEIGHT: 146 LBS | HEIGHT: 71 IN | SYSTOLIC BLOOD PRESSURE: 110 MMHG | DIASTOLIC BLOOD PRESSURE: 72 MMHG | BODY MASS INDEX: 20.44 KG/M2

## 2018-09-26 DIAGNOSIS — Z86.79 S/P ASCENDING AORTIC ANEURYSM REPAIR: ICD-10-CM

## 2018-09-26 DIAGNOSIS — E78.5 HYPERLIPIDEMIA, UNSPECIFIED HYPERLIPIDEMIA TYPE: ICD-10-CM

## 2018-09-26 DIAGNOSIS — Z95.1 HX OF CABG: ICD-10-CM

## 2018-09-26 DIAGNOSIS — G47.33 OSA ON CPAP: ICD-10-CM

## 2018-09-26 DIAGNOSIS — I25.10 CORONARY ARTERY DISEASE INVOLVING NATIVE CORONARY ARTERY OF NATIVE HEART WITHOUT ANGINA PECTORIS: Primary | ICD-10-CM

## 2018-09-26 DIAGNOSIS — Z98.890 S/P ASCENDING AORTIC ANEURYSM REPAIR: ICD-10-CM

## 2018-09-26 DIAGNOSIS — Z99.89 OSA ON CPAP: ICD-10-CM

## 2018-09-26 DIAGNOSIS — Z95.2 MECHANICAL HEART VALVE PRESENT: ICD-10-CM

## 2018-09-26 DIAGNOSIS — I10 ESSENTIAL HYPERTENSION: ICD-10-CM

## 2018-09-26 PROBLEM — R06.83 SNORING: Status: RESOLVED | Noted: 2017-12-04 | Resolved: 2018-09-26

## 2018-09-26 PROCEDURE — 99214 OFFICE O/P EST MOD 30 MIN: CPT | Performed by: INTERNAL MEDICINE

## 2018-09-26 PROCEDURE — 93000 ELECTROCARDIOGRAM COMPLETE: CPT | Performed by: INTERNAL MEDICINE

## 2018-09-26 RX ORDER — WARFARIN SODIUM 5 MG/1
5 TABLET ORAL DAILY
Qty: 45 TABLET | Refills: 3 | Status: SHIPPED | OUTPATIENT
Start: 2018-09-26 | End: 2019-04-09 | Stop reason: SDUPTHER

## 2018-09-26 NOTE — PROGRESS NOTES
Date of Office Visit: 2018  Encounter Provider: Donya Gagnon MD  Place of Service: [facility]  Patient Name: Bobby L Tapley  :1940      Patient ID:  Bobby L Tapley is a 78 y.o. male is here for  followup for CAD, mechanical AVR.         History of Present Illness     In  he underwent replacement of his aortic valve, getting a mechanical aortic valve.   He also received saphenous vein graft to LAD, replacement of the ascending and transverse  aortic arch with reimplantation of the coronary arteries onto the arch.  His operative report from [facility] dated 2004.  they found intraoperatively was 50-60% mid LAD stenosis, moderately severe aortic valve  stenosis, calcified bileaflet aortic valve and a 6 cm ascending aortic aneurysm as well as  dilation of the transverse thoracic aorta. He underwent a Bentall procedure and received  a #23 Carbomedics mechanical aortic valve with conduit. He had 30 mm woven Hemashield gold graft  used to replace the transverse thoracic aorta. The internal mammary artery was not used  because it was small on inspection so he received a saphenous vein graft to the LAD. This  procedure was done by Dr. Santana Walls.         He had an echocardiogram which was done in 2013 which revealed a normally functioning  mechanical aortic valve. His ejection fraction was 60% with normal LV size and no LVH.   He had a cardiac catheterization which was done in 2012 which revealed a patent LAD,  patent left main, patent circumflex vessel, patent RCA and occluded saphenous vein graft  to LAD. stress nuclear perfusion study which was performed in 2012 prior  to the catheterization which showed a small amount of inferior wall ischemia but no ST  changes on that.       He had a 2-D echocardiogram with Doppler done in 02/10/2015, showing an ejection fraction  of 65%, septal hypokinesis, mechanical type prosthetic valve, which was  normal and trace  tricuspid insufficiency.     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  There is evidence of chronic emphysema, hepatic steatosis and a prosthetic aortic valve.  He had an echo cardiac exam done October 2017 showing ejection fraction 62%, mechanical prosthetic aortic valve which was normal, trivial insufficiency, trace mitral tricuspid insufficiency, aortic root dilation.    He has obstructive sleep apnea and is using CPAP.  He does not have chest pain.  He has no difficulty breathing.  He does have hip pain and back pain.  In addition, he is waking at night with numbness in digits 3 through 5 of his right hand.  He doesn't feel his heart racing or skipping.      Past Medical History:   Diagnosis Date   • Aneurysm of aortic arch (CMS/HCC)    • Aortic insufficiency    • Aortic stenosis    • Arthritis    • Ascending aortic aneurysm (CMS/HCC)    • CAD (coronary artery disease)    • Enlarged prostate    • H/O aortic valve replacement    • H/O echocardiogram 02/10/2015   • Health care maintenance    • History of EKG 07/07/2015   • Hyperlipidemia    • Hypertension    • Kidney stones          Past Surgical History:   Procedure Laterality Date   • AORTIC VALVE REPAIR/REPLACEMENT     • CARDIAC CATHETERIZATION  11/27/2013   • COLONOSCOPY N/A 8/15/2017    Procedure: COLONOSCOPY;  Surgeon: Hu Bergman MD;  Location: [facility];  Service:    • CORONARY ARTERY BYPASS GRAFT     • ROTATOR CUFF REPAIR         Current Outpatient Prescriptions on File Prior to Visit   Medication Sig Dispense Refill   • aspirin 81 MG tablet Take 81 mg by mouth Daily.     • enalapril (VASOTEC) 2.5 MG tablet Take 2.5 mg by mouth.     • finasteride (PROSCAR) 5 MG tablet Take 5 mg by mouth.     • HYDROcodone-acetaminophen (NORCO) 5-325 MG per tablet  Take 1 tablet by mouth as needed.     • L-Lysine 500 MG capsule Take 1 capsule by mouth As Needed.     • Multiple Vitamins-Minerals (CENTRUM SILVER) tablet Take 1 tablet by mouth Daily.     • silver sulfadiazine (SILVADENE, SSD) 1 % cream Apply  topically.     • vitamin C (ASCORBIC ACID) 500 MG tablet Take 1,000 mg by mouth As Needed.     • warfarin (COUMADIN) 5 MG tablet TAKE ONE TABLET BY MOUTH DAILY OR AS DIRECTED 90 tablet 0   • HYDROcodone-acetaminophen (NORCO) 5-325 MG per tablet Take 1 tablet by mouth.     • mirtazapine (REMERON) 15 MG tablet Take 15 mg by mouth.       No current facility-administered medications on file prior to visit.        Social History     Social History   • Marital status:      Spouse name: N/A   • Number of children: N/A   • Years of education: N/A     Occupational History   • retired      Social History Main Topics   • Smoking status: Never Smoker   • Smokeless tobacco: Never Used   • Alcohol use No      Comment: caffeine use   • Drug use: No   • Sexual activity: Defer     Other Topics Concern   • Not on file     Social History Narrative   • No narrative on file           Review of Systems   Constitution: Negative.   HENT: Positive for hearing loss. Negative for congestion.    Eyes: Negative for vision loss in left eye and vision loss in right eye.   Cardiovascular: Positive for dyspnea on exertion and leg swelling.   Respiratory: Negative.  Negative for cough, hemoptysis, shortness of breath, sleep disturbances due to breathing, snoring, sputum production and wheezing.    Endocrine: Negative.    Hematologic/Lymphatic: Negative.    Skin: Negative for poor wound healing and rash.   Musculoskeletal: Positive for joint pain. Negative for falls, gout, muscle cramps and myalgias.   Gastrointestinal: Negative for abdominal pain, diarrhea, dysphagia, hematemesis, melena, nausea and vomiting.   Neurological: Negative for excessive daytime sleepiness, dizziness, headaches,  "light-headedness, loss of balance, seizures and vertigo.   Psychiatric/Behavioral: Negative for depression and substance abuse. The patient is not nervous/anxious.        Procedures    ECG 12 Lead  Date/Time: 9/26/2018 11:37 AM  Performed by: RICHA MARRUFO  Authorized by: RICHA MARRUFO   Comparison: compared with previous ECG   Similar to previous ECG  Rhythm: sinus rhythm  QRS axis: left  Other findings: LVH and PRWP  Clinical impression: abnormal ECG                Objective:      Vitals:    09/26/18 1129   BP: 110/72   Pulse: 62   Weight: 66.2 kg (146 lb)   Height: 180.3 cm (71\")     Body mass index is 20.36 kg/m².    Physical Exam   Constitutional: He is oriented to person, place, and time. He appears well-developed and well-nourished. No distress.   HENT:   Head: Normocephalic and atraumatic.   Eyes: Conjunctivae are normal. No scleral icterus.   Neck: Neck supple. No JVD present. Carotid bruit is not present. No thyromegaly present.   Cardiovascular: Normal rate, regular rhythm, S1 normal, S2 normal, normal heart sounds and intact distal pulses.   No extrasystoles are present. PMI is not displaced.  Exam reveals no gallop.    No murmur heard.  Pulses:       Carotid pulses are 2+ on the right side, and 2+ on the left side.       Radial pulses are 2+ on the right side, and 2+ on the left side.        Dorsalis pedis pulses are 2+ on the right side, and 2+ on the left side.        Posterior tibial pulses are 2+ on the right side, and 2+ on the left side.   Pulmonary/Chest: Effort normal and breath sounds normal. No respiratory distress. He has no wheezes. He has no rhonchi. He has no rales. He exhibits no tenderness.   Abdominal: Soft. Bowel sounds are normal. He exhibits no distension, no abdominal bruit and no mass. There is no tenderness.   Musculoskeletal: He exhibits no edema or deformity.   Lymphadenopathy:     He has no cervical adenopathy.   Neurological: He is alert and oriented to person, " place, and time. No cranial nerve deficit.   Skin: Skin is warm and dry. No rash noted. He is not diaphoretic. No cyanosis. No pallor. Nails show no clubbing.   Psychiatric: He has a normal mood and affect. Judgment normal.   Vitals reviewed.      Lab Review:       Assessment:      Diagnosis Plan   1. Coronary artery disease involving native coronary artery of native heart without angina pectoris     2. Mechanical heart valve present     3. Essential hypertension     4. Hyperlipidemia, unspecified hyperlipidemia type     5. JOY on CPAP     6. Hx of CABG     7. S/P ascending aortic aneurysm repair       1. Status post mechanical aortic valve replacement for a bicuspid aortic valve with severe  stenosis and large ascending aortic aneurysm. This was done in 02/2004. He received a 23  mm CarbomedInstantQuest mechanical aortic valve with a conduit. His thoracic aorta was repaired  with 30 mm woven Hemashield gold graft and his ascending aortic aneurysm was also  repaired. He received a saphenous vein graft to LAD at that time. He then had a cardiac  catheterization in 2012 which revealed the saphenous vein graft to the LAD was occluded  but his coronary arteries were all patent. At this time he has no active angina. His echocardiogram from 10/2017, shows normal function of his  mechanical aortic valve.   2. Normal ejection fraction   3. Hypertension under fair control.   4. Hyperlipidemia. The patient is on no statin therapy for this. His lipids are well controlled.   5. Renal calculi; stable.   6. JOY, on CPAP.      Plan:       See yesenia in 1 year, no changes.       Coronary Artery Disease  Assessment  • The patient has no angina    Subjective - Objective  • There is a history of previous coronary artery bypass graft  • Current antiplatelet therapy includes aspirin 81 mg

## 2018-10-31 ENCOUNTER — HOSPITAL ENCOUNTER (OUTPATIENT)
Dept: CT IMAGING | Facility: HOSPITAL | Age: 78
Discharge: HOME | End: 2018-10-31
Attending: THORACIC SURGERY (CARDIOTHORACIC VASCULAR SURGERY) | Admitting: THORACIC SURGERY (CARDIOTHORACIC VASCULAR SURGERY)

## 2018-10-31 DIAGNOSIS — Z86.79 S/P ASCENDING AORTIC ANEURYSM REPAIR: ICD-10-CM

## 2018-10-31 DIAGNOSIS — Z98.890 S/P ASCENDING AORTIC ANEURYSM REPAIR: ICD-10-CM

## 2018-10-31 LAB — CREAT BLDA-MCNC: 0.7 MG/DL (ref 0.6–1.3)

## 2018-10-31 PROCEDURE — 71275 CT ANGIOGRAPHY CHEST: CPT

## 2018-10-31 PROCEDURE — 0 IOPAMIDOL PER 1 ML: Performed by: THORACIC SURGERY (CARDIOTHORACIC VASCULAR SURGERY)

## 2018-10-31 PROCEDURE — 82565 ASSAY OF CREATININE: CPT

## 2018-10-31 RX ADMIN — IOPAMIDOL 95 ML: 755 INJECTION, SOLUTION INTRAVENOUS at 11:50

## 2018-11-21 ENCOUNTER — OFFICE VISIT (OUTPATIENT)
Dept: CARDIAC SURGERY | Facility: CLINIC | Age: 78
End: 2018-11-21

## 2018-11-21 VITALS
SYSTOLIC BLOOD PRESSURE: 131 MMHG | RESPIRATION RATE: 20 BRPM | WEIGHT: 142 LBS | HEART RATE: 73 BPM | TEMPERATURE: 98.2 F | OXYGEN SATURATION: 95 % | HEIGHT: 71 IN | DIASTOLIC BLOOD PRESSURE: 82 MMHG | BODY MASS INDEX: 19.88 KG/M2

## 2018-11-21 DIAGNOSIS — I10 ESSENTIAL HYPERTENSION: ICD-10-CM

## 2018-11-21 DIAGNOSIS — Z98.890 HX OF ASCENDING AORTA REPAIR: Primary | ICD-10-CM

## 2018-11-21 DIAGNOSIS — I71.22 AORTIC ARCH ANEURYSM (HCC): ICD-10-CM

## 2018-11-21 LAB — INR PPP: 1.8

## 2018-11-21 PROCEDURE — 99213 OFFICE O/P EST LOW 20 MIN: CPT | Performed by: NURSE PRACTITIONER

## 2018-11-27 ENCOUNTER — TELEPHONE (OUTPATIENT)
Dept: PHARMACY | Facility: HOSPITAL | Age: 78
End: 2018-11-27

## 2018-11-27 PROBLEM — I71.22 AORTIC ARCH ANEURYSM (HCC): Status: ACTIVE | Noted: 2018-11-27

## 2018-11-27 NOTE — PROGRESS NOTES
11/27/2018      Subjective:      Serenity Adam MD    Chief Complaint: Follow up ascending aortic aneurysm repair and aortic arch aneurysm    History of Present Illness:       Dear Serenity Ward MD and Colleagues,    It was nice to see Bobby L Tapley in follow-up for ascending aortic aneurysm repair and aortic arch aneurysm  . He is a 78 y.o. male with a history of mechanical AV conduit with hemiarch replacement with a graft and MV CABG per Dr. Bailey at [facility] Feb 2004.   He was originally seen by Dr. 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  Dr. Cox's measurements in 2017 were 4.4 cm aortic arch at the graft anastomosis  and no acute dissection.  He comes in today for routine follow-up for aneurysm surveillence.  The CTAof chest on 10/31/2018 was reviewed with Dr. Cox and is consistent with the radiologists reading of 4.2-4.4 cm aortic arch and unchanged questionable old dissection.  He continues to do well anticoagulation.  He admits to  shortness of breath occasionally with yard work--rest alleviates his SOA.  He has also had memory changes in the last year and had a 20 pound weight loss unexpectedly.  He is being worked up for both of these issues.  He denies chest/back pain, numbness/tingling/pain of extremities.  No vascular deficiencies or hyperextensible or hypermobile joints are noted on exam.  The patient's family history is questionable for aneurysms--both his father and oldest brother had sudden death and aneurysmal disease was talked about with their deaths.  His family history is negative for connective tissue disease, and negative for coronary disease in first degree family members.  His is wife is here today for his appt and is providing most of his history.  He is conversational and pleasant and does include information he feels she has left out.          Patient Active Problem List   Diagnosis   • H/O  aortic valve replacement   • BP (high blood pressure)   • HLD (hyperlipidemia)   • CAD (coronary artery disease)   • Hx of CABG   • hx of mechanical AV conduit ([id number] 23 mm) by Dr. Santana Bailey at [facility] 2/25/2004   • JOY on CPAP   • Hypersomnia with sleep apnea   • Weight loss   • Witnessed episode of apnea   • BPH (benign prostatic hyperplasia)   • Chronic headaches   • CTS (carpal tunnel syndrome)   • Dyslipidemia   • High frequency hearing loss   • Mechanical heart valve present   • Nephrolithiasis   • Osteoarthritis   • Sleep related hypoxia   • Situational depression   • Subcutaneous lipoma   • Leg pain, right   • Aortic arch aneurysm (CMS/HCC)       Past Medical History:   Diagnosis Date   • Aneurysm of aortic arch (CMS/HCC)    • Aortic insufficiency    • Aortic stenosis    • Arthritis    • Ascending aortic aneurysm (CMS/HCC)    • CAD (coronary artery disease)    • Enlarged prostate    • H/O aortic valve replacement    • H/O echocardiogram 02/10/2015   • Health care maintenance    • History of EKG 07/07/2015   • Hyperlipidemia    • Hypertension    • Kidney stones        Past Surgical History:   Procedure Laterality Date   • AORTIC VALVE REPAIR/REPLACEMENT     • CARDIAC CATHETERIZATION  11/27/2013   • CORONARY ARTERY BYPASS GRAFT     • ROTATOR CUFF REPAIR         No Known Allergies      Current Outpatient Medications:   •  aspirin 81 MG tablet, Take 81 mg by mouth Daily., Disp: , Rfl:   •  enalapril (VASOTEC) 2.5 MG tablet, Take 2.5 mg by mouth., Disp: , Rfl:   •  finasteride (PROSCAR) 5 MG tablet, Take 5 mg by mouth., Disp: , Rfl:   •  HYDROcodone-acetaminophen (NORCO) 5-325 MG per tablet, Take 1 tablet by mouth as needed., Disp: , Rfl:   •  L-Lysine 500 MG capsule, Take 1 capsule by mouth As Needed., Disp: , Rfl:   •  Multiple Vitamins-Minerals (CENTRUM SILVER) tablet, Take 1 tablet by mouth Daily., Disp: , Rfl:   •  warfarin (COUMADIN) 5 MG tablet, Take 1 tablet by mouth Daily. as directed,  Disp: 45 tablet, Rfl: 3    Social History     Socioeconomic History   • Marital status:      Spouse name: Not on file   • Number of children: Not on file   • Years of education: Not on file   • Highest education level: Not on file   Social Needs   • Financial resource strain: Not on file   • Food insecurity - worry: Not on file   • Food insecurity - inability: Not on file   • Transportation needs - medical: Not on file   • Transportation needs - non-medical: Not on file   Occupational History   • Occupation: retired   Tobacco Use   • Smoking status: Never Smoker   • Smokeless tobacco: Never Used   Substance and Sexual Activity   • Alcohol use: No     Comment: caffeine use   • Drug use: No   • Sexual activity: Defer   Other Topics Concern   • Not on file   Social History Narrative   • Not on file       Family History   Problem Relation Age of Onset   • Cancer Mother    • Aneurysm Father    • Heart disease Father    • Aneurysm Brother    • Heart disease Brother    • Hypertension Cousin    • Obesity Cousin    • Lung cancer Cousin    • Malig Hyperthermia Neg Hx            Review of Systems:  Review of Systems   Constitutional: Negative for unexpected weight change.   Respiratory: Positive for shortness of breath.    Cardiovascular: Negative for chest pain, palpitations and leg swelling.   Gastrointestinal: Negative for abdominal distention, abdominal pain, blood in stool, constipation, diarrhea, nausea and vomiting.   Musculoskeletal: Negative for back pain.   Neurological: Negative for syncope and light-headedness.       Physical Exam:    Vital Signs:  Weight: 64.4 kg (142 lb)   Body mass index is 19.8 kg/m².  Temp: 98.2 °F (36.8 °C)   Heart Rate: 73   BP: 131/82     Physical Exam   Constitutional: He is oriented to person, place, and time. Vital signs are normal. He appears well-developed and well-nourished. He is active and cooperative.   HENT:   Head: Normocephalic and atraumatic.   Eyes: Conjunctivae and  lids are normal.   Neck: Trachea normal. Normal carotid pulses, no hepatojugular reflux and no JVD present. Carotid bruit is not present. No thyromegaly present.   Cardiovascular: Normal rate, regular rhythm, normal heart sounds and normal pulses.   Pulses:       Carotid pulses are 2+ on the right side, and 2+ on the left side.       Radial pulses are 2+ on the right side, and 2+ on the left side.        Femoral pulses are 2+ on the right side, and 2+ on the left side.       Popliteal pulses are 2+ on the right side, and 2+ on the left side.        Dorsalis pedis pulses are 2+ on the right side, and 2+ on the left side.        Posterior tibial pulses are 2+ on the right side, and 2+ on the left side.   Normal s1, mechanical s2 sound   Pulmonary/Chest: Effort normal and breath sounds normal.   Abdominal: Soft. Normal appearance and normal aorta. He exhibits no abdominal bruit. There is no hepatosplenomegaly. There is no CVA tenderness.   Musculoskeletal:   Gait steady and strong   Lymphadenopathy:        Head (right side): No submental, no submandibular, no tonsillar, no preauricular, no posterior auricular and no occipital adenopathy present.        Head (left side): No submental, no submandibular, no tonsillar, no preauricular, no posterior auricular and no occipital adenopathy present.        Right cervical: No superficial cervical, no deep cervical and no posterior cervical adenopathy present.       Left cervical: No superficial cervical, no deep cervical and no posterior cervical adenopathy present.        Right: No supraclavicular adenopathy present.        Left: Supraclavicular adenopathy present.   Neurological: He is alert and oriented to person, place, and time. GCS eye subscore is 4. GCS verbal subscore is 5. GCS motor subscore is 6.   Gait steady   Skin: Skin is warm, dry and intact. No rash noted. No cyanosis. Nails show no clubbing.        Healed sternotomy   Psychiatric: He has a normal mood and  affect. His speech is normal and behavior is normal. Judgment and thought content normal. Cognition and memory are normal.   Vitals reviewed.       Assessment:     1.  Ascending aortic aneurysm, s/p mechanical AV conduit 2004--stable, tolerating anticoagulation well  2.  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  HTN--stable    Recommendation/Plan:       Continued risk factor modification of hypertension with a goal blood pressure less than 130/80, hyperlipidemia optimization, and continued avoidance of tobacco/nicotine products.    We discussed the importance of avoidance of over the counter decongestants and stimulants, specifically pseudoephedrine, for hypertension and aneurysm management    Initiation of low aerobic exercise is indicated to help reduce body habitus, assist with blood pressure and cholesterol control.       Although risk of rupture is low, emergency department presentation is warranted for acute chest, back, or abdominal pain, syncope, or stroke like symptoms.    Follow up in [facility] Clinic in one year with CTA chest and echocardiogram prior to visit.    Thank you for allowing us to participate in his care.    Regards,    Rachana Masters, APRN      **All problems and history are new to this examiner.  Extensive review of records prior to and during patient visit

## 2018-12-06 LAB — INR PPP: 1.8

## 2018-12-07 ENCOUNTER — ANTICOAGULATION VISIT (OUTPATIENT)
Dept: PHARMACY | Facility: HOSPITAL | Age: 78
End: 2018-12-07

## 2018-12-07 DIAGNOSIS — Z95.2 H/O AORTIC VALVE REPLACEMENT: ICD-10-CM

## 2018-12-07 DIAGNOSIS — Z95.2 MECHANICAL HEART VALVE PRESENT: ICD-10-CM

## 2018-12-07 NOTE — PROGRESS NOTES
Anticoagulation Clinic Progress Note    Anticoagulation Summary  As of 2018    INR goal:   2.5-3.5   TTR:   --   INR used for dosin.80! (2018)   Warfarin maintenance plan:   5 mg on Sun, Tue, u; 7.5 mg all other days   Weekly warfarin total:   45 mg   Plan last modified:   Brad Anderson RPH (2018)   Next INR check:   2018   Priority:   Maintenance   Target end date:   Indefinite    Indications    H/O aortic valve replacement [Z95.2]  Mechanical heart valve present [Z95.2]             Anticoagulation Episode Summary     INR check location:   Home Draw    Preferred lab:       Send INR reminders to:    [facility]  POOL    Comments:   **[facility]**      Anticoagulation Care Providers     Provider Role Specialty Phone number    Donya Gagnon MD Referring Cardiology [phone]          Education:  Bobby L Tapley is a new start in the [facility].     Bobby L Tapley was mailed information regarding the transition. he expressed verbal consent and agreement to receive care through the [facility] under the current collaborative care agreement with [facility].     Pt has agreed to only be called if INR out of range. They have been instructed to call if any changes in medications, doses, concerns, etc. Patient expresses understanding and has no further questions at this time.  Instructions were given to patient's wife.    INR History:  Previous INR data from [facility] is recorded in [facility] and scanned into the patient's chart in [employer]. These results have been analyzed and reviewed.      Plan:  1. INR is Subtherapeutic today- see above in Anticoagulation Summary.   Will instruct Bobby L Tapley to Increase their warfarin regimen- see above in Anticoagulation Summary.  2. Follow-up in 2 weeks  3. Patient declines warfarin refills.  4. Verbal and any requested printed information provided. Patient  expresses understanding and has no further questions at this time.    Brad Anderson, [facility]

## 2018-12-19 ENCOUNTER — ANTICOAGULATION VISIT (OUTPATIENT)
Dept: PHARMACY | Facility: HOSPITAL | Age: 78
End: 2018-12-19

## 2018-12-19 DIAGNOSIS — Z95.2 H/O AORTIC VALVE REPLACEMENT: ICD-10-CM

## 2018-12-19 DIAGNOSIS — Z95.2 MECHANICAL HEART VALVE PRESENT: ICD-10-CM

## 2018-12-19 LAB — INR PPP: 2.5

## 2018-12-19 NOTE — PROGRESS NOTES
Anticoagulation Clinic Progress Note    Anticoagulation Summary  As of 2018    INR goal:   2.5-3.5   TTR:   0.0 % (2 d)   INR used for dosin.50 (2018)   Warfarin maintenance plan:   5 mg on Sun, Tue, Thu; 7.5 mg all other days   Weekly warfarin total:   45 mg   No change documented:   Brad Anderson RPH   Plan last modified:   Brad Anderson RPH (2018)   Next INR check:   2019   Priority:   Maintenance   Target end date:   Indefinite    Indications    H/O aortic valve replacement [Z95.2]  Mechanical heart valve present [Z95.2]             Anticoagulation Episode Summary     INR check location:   Home Draw    Preferred lab:       Send INR reminders to:    [facility]  POOL    Comments:   **[facility]**      Anticoagulation Care Providers     Provider Role Specialty Phone number    Donya Gagnon MD Referring Cardiology [phone]          Drug interactions: has remained unchanged.  Diet: has remained unchanged.    Clinic Interview:  No pertinent clinical findings have been reported.    INR History:  Anticoagulation Monitoring 2018   INR 1.80 2.50   INR Date 2018   INR Goal 2.5-3.5 2.5-3.5   Trend - Same   Last Week Total 42.5 mg 45 mg   Next Week Total 45 mg 45 mg   Sun 5 mg 5 mg   Mon 7.5 mg 7.5 mg   Tue 5 mg 5 mg   Wed 7.5 mg 7.5 mg   Thu 5 mg 5 mg   Fri 7.5 mg 7.5 mg   Sat 7.5 mg 7.5 mg   Visit Report - -       Plan:  1. INR is Therapeutic today- see above in Anticoagulation Summary.   Will instruct Bobby L Tapley to Continue their warfarin regimen- see above in Anticoagulation Summary.  2. Follow up in 2 weeks  3. Pt has agreed to only be called if INR out of range. They have been instructed to call if any changes in medications, doses, concerns, etc.     Brad Anderson RPH

## 2019-01-02 LAB — INR PPP: 3

## 2019-01-03 ENCOUNTER — ANTICOAGULATION VISIT (OUTPATIENT)
Dept: PHARMACY | Facility: HOSPITAL | Age: 79
End: 2019-01-03

## 2019-01-03 DIAGNOSIS — Z95.2 H/O AORTIC VALVE REPLACEMENT: ICD-10-CM

## 2019-01-03 DIAGNOSIS — Z95.2 MECHANICAL HEART VALVE PRESENT: ICD-10-CM

## 2019-01-03 NOTE — PROGRESS NOTES
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  Diet: has remained unchanged.    Clinic Interview:  No pertinent clinical findings have been reported.    INR History:  Anticoagulation Monitoring 12/7/2018 12/19/2018 1/3/2019   INR 1.80 2.50 3.00   INR Date 12/6/2018 12/19/2018 1/2/2019   INR Goal 2.5-3.5 2.5-3.5 2.5-3.5   Trend - Same Same   Last Week Total 42.5 mg 45 mg 45 mg   Next Week Total 45 mg 45 mg 45 mg   Sun 5 mg 5 mg 5 mg   Mon 7.5 mg 7.5 mg 7.5 mg   Tue 5 mg 5 mg 5 mg   Wed 7.5 mg 7.5 mg 7.5 mg   Thu 5 mg 5 mg 5 mg   Fri 7.5 mg 7.5 mg 7.5 mg   Sat 7.5 mg 7.5 mg 7.5 mg   Visit Report - - -   Some recent data might be hidden       Plan:  1. INR is Therapeutic today- see above in Anticoagulation Summary.   Will instruct Bobby L Tapley to Continue their warfarin regimen- see above in Anticoagulation Summary.  2. Follow up in 2 weeks  3. Pt has agreed to only be called if INR out of range. They have been instructed to call if any changes in medications, doses, concerns, etc. Patient expresses understanding and has  no further questions at this time.    Brad Anderson [facility]

## 2019-01-16 ENCOUNTER — ANTICOAGULATION VISIT (OUTPATIENT)
Dept: PHARMACY | Facility: HOSPITAL | Age: 79
End: 2019-01-16

## 2019-01-16 DIAGNOSIS — Z95.2 MECHANICAL HEART VALVE PRESENT: ICD-10-CM

## 2019-01-16 DIAGNOSIS — Z95.2 H/O AORTIC VALVE REPLACEMENT: ICD-10-CM

## 2019-01-16 LAB — INR PPP: 3.8

## 2019-01-16 NOTE — PROGRESS NOTES
Anticoagulation Clinic Progress Note    Anticoagulation Summary  As of 1/16/2019    INR goal:   2.5-3.5   TTR:   75.8 % (1 mo)   INR used for dosing:   3.80! 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  Diet: has remained unchanged.    Clinic Interview:  No pertinent clinical findings have been reported.    INR History:  Anticoagulation Monitoring 12/19/2018 1/3/2019 1/16/2019   INR 2.50 3.00 3.80   INR Date 12/19/2018 1/2/2019 1/16/2019   INR Goal 2.5-3.5 2.5-3.5 2.5-3.5   Trend Same Same Down   Last Week Total 45 mg 45 mg 45 mg   Next Week Total 45 mg 45 mg 40 mg   Sun 5 mg 5 mg 5 mg   Mon 7.5 mg 7.5 mg 7.5 mg   Tue 5 mg 5 mg 5 mg   Wed 7.5 mg 7.5 mg 5 mg (1/16); Otherwise 7.5 mg   Thu 5 mg 5 mg 5 mg   Fri 7.5 mg 7.5 mg 7.5 mg   Sat 7.5 mg 7.5 mg 5 mg   Visit Report - - -   Some recent data might be hidden       Plan:  1. INR is Supratherapeutic today- see above in Anticoagulation Summary.   Will instruct Bobby L Tapley to Change their warfarin regimen- see above in Anticoagulation Summary.  2. Follow up in 2 weeks  3. Pt has agreed to only be called if INR out of range. They have been instructed to call if any changes in medications, doses, concerns, etc. Patient expresses understanding and has no further questions  at this time.    Brad Anderson RPH

## 2019-01-30 ENCOUNTER — ANTICOAGULATION VISIT (OUTPATIENT)
Dept: PHARMACY | Facility: HOSPITAL | Age: 79
End: 2019-01-30

## 2019-01-30 DIAGNOSIS — Z95.2 H/O AORTIC VALVE REPLACEMENT: ICD-10-CM

## 2019-01-30 DIAGNOSIS — Z95.2 MECHANICAL HEART VALVE PRESENT: ICD-10-CM

## 2019-01-30 LAB — INR PPP: 2.3

## 2019-01-30 NOTE — PROGRESS NOTES
Anticoagulation Clinic Progress Note    Anticoagulation Summary  As of 2019    INR goal:   2.5-3.5   TTR:   72.9 % (1.5 mo)   INR used for dosin.30! 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    INR History:  Anticoagulation Monitoring 1/3/2019 2019 2019   INR 3.00 3.80 2.30   INR Date 2019   INR Goal 2.5-3.5 2.5-3.5 2.5-3.5   Trend Same Down Same   Last Week Total 45 mg 45 mg 42.5 mg   Next Week Total 45 mg 40 mg 45 mg   Sun 5 mg 5 mg 5 mg   Mon 7.5 mg 7.5 mg 7.5 mg   Tue 5 mg 5 mg 5 mg   Wed 7.5 mg 5 mg (); Otherwise 7.5 mg 7.5 mg   Thu 5 mg 5 mg 7.5 mg (); Otherwise 5 mg   Fri 7.5 mg 7.5 mg 7.5 mg   Sat 7.5 mg 5 mg 5 mg   Visit Report - - -   Some recent data might be hidden       Plan:  1. INR is subtherapeutic today- see above in Anticoagulation Summary. Spoke with Bobby L Tapley's wife. Instructed to take a booster dose of 7.5mg on Thursday, then resume the 42.5mg/week.   2. Follow up in 2 weeks  3. Pt has agreed to only be called if INR out of range. They have been instructed to call if any changes in medications, doses, concerns, etc. Family expresses understanding and has no further questions at this time.    Layla Ortega,  RPH

## 2019-02-13 LAB — INR PPP: 2.9

## 2019-02-14 ENCOUNTER — ANTICOAGULATION VISIT (OUTPATIENT)
Dept: PHARMACY | Facility: HOSPITAL | Age: 79
End: 2019-02-14

## 2019-02-14 DIAGNOSIS — Z95.2 MECHANICAL HEART VALVE PRESENT: ICD-10-CM

## 2019-02-14 DIAGNOSIS — Z95.2 H/O AORTIC VALVE REPLACEMENT: ICD-10-CM

## 2019-02-14 NOTE — PROGRESS NOTES
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  Diet: has remained unchanged.    Clinic Interview:  No pertinent clinical findings have been reported.    INR History:  Anticoagulation Monitoring 2019   INR 3.80 2.30 2.90   INR Date 2019   INR Goal 2.5-3.5 2.5-3.5 2.5-3.5   Trend Down Same Same   Last Week Total 45 mg 42.5 mg 42.5 mg   Next Week Total 40 mg 45 mg 42.5 mg   Sun 5 mg 5 mg 5 mg   Mon 7.5 mg 7.5 mg 7.5 mg   Tue 5 mg 5 mg 5 mg   Wed 5 mg (); Otherwise 7.5 mg 7.5 mg 7.5 mg   Thu 5 mg 7.5 mg (); Otherwise 5 mg 5 mg   Fri 7.5 mg 7.5 mg 7.5 mg   Sat 5 mg 5 mg 5 mg   Visit Report - - -   Some recent data might be hidden       Plan:  1. INR is Therapeutic today- see above in Anticoagulation Summary.   Will instruct Bobby L Tapley to Continue their warfarin regimen- see above in Anticoagulation Summary.  2. Follow up in 2 weeks  3. Pt has agreed to only be called if INR out of range. They have been instructed to call if any changes in medications, doses,  concerns, etc. Patient expresses understanding and has no further questions at this time.    Brad Anderson [facility]

## 2019-02-27 ENCOUNTER — ANTICOAGULATION VISIT (OUTPATIENT)
Dept: PHARMACY | Facility: HOSPITAL | Age: 79
End: 2019-02-27

## 2019-02-27 DIAGNOSIS — Z95.2 H/O AORTIC VALVE REPLACEMENT: ICD-10-CM

## 2019-02-27 DIAGNOSIS — Z95.2 MECHANICAL HEART VALVE PRESENT: ICD-10-CM

## 2019-02-27 LAB — INR PPP: 2.6

## 2019-02-27 NOTE — PROGRESS NOTES
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    INR History:  Anticoagulation Monitoring 2019   INR 2.30 2.90 2.60   INR Date 2019   INR Goal 2.5-3.5 2.5-3.5 2.5-3.5   Trend Same Same Same   Last Week Total 42.5 mg 42.5 mg 42.5 mg   Next Week Total 45 mg 42.5 mg 42.5 mg   Sun 5 mg 5 mg 5 mg   Mon 7.5 mg 7.5 mg 7.5 mg   Tue 5 mg 5 mg 5 mg   Wed 7.5 mg 7.5 mg 7.5 mg   Thu 7.5 mg (); Otherwise 5 mg 5 mg 5 mg   Fri 7.5 mg 7.5 mg 7.5 mg   Sat 5 mg 5 mg 5 mg   Visit Report - - -   Some recent data might be hidden       Plan:  1. INR is therapeutic today- see above in Anticoagulation Summary.    Bobby L Tapley to continue their warfarin regimen- see above in Anticoagulation Summary.  2. Follow up in 2 weeks  3. Pt has agreed to only be called if INR out of range. They have been instructed to call if any changes in medications, doses, concerns, etc. Patient expresses understanding and has no further questions at this time.    Kayleen Rodriguez

## 2019-03-15 ENCOUNTER — ANTICOAGULATION VISIT (OUTPATIENT)
Dept: PHARMACY | Facility: HOSPITAL | Age: 79
End: 2019-03-15

## 2019-03-15 DIAGNOSIS — Z95.2 H/O AORTIC VALVE REPLACEMENT: ICD-10-CM

## 2019-03-15 DIAGNOSIS — Z95.2 MECHANICAL HEART VALVE PRESENT: ICD-10-CM

## 2019-03-15 LAB — INR PPP: 2.8

## 2019-03-15 NOTE — PROGRESS NOTES
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]             Anticoagulation Episode Summary     INR check location:   Home Draw    Preferred lab:       Send INR reminders to:    [facility]    Comments:   [facility]**      Anticoagulation Care Providers     Provider Role Specialty Phone number    Donya Gagnon MD Referring Cardiology [phone]              INR History:  Anticoagulation Monitoring 2019 2019 3/15/2019   INR 2.90 2.60 2.80   INR Date 2019 2019 3/15/2019   INR Goal 2.5-3.5 2.5-3.5 2.5-3.5   Trend Same Same Same   Last Week Total 42.5 mg 42.5 mg 42.5 mg   Next Week Total 42.5 mg 42.5 mg 42.5 mg   Sun 5 mg 5 mg 5 mg   Mon 7.5 mg 7.5 mg 7.5 mg   Tue 5 mg 5 mg 5 mg   Wed 7.5 mg 7.5 mg 7.5 mg   Thu 5 mg 5 mg 5 mg   Fri 7.5 mg 7.5 mg 7.5 mg   Sat 5 mg 5 mg 5 mg   Visit Report - - -   Some recent data might be hidden       Plan:  1. INR is Therapeutic today- see above in Anticoagulation Summary.   Will instruct Bobby L Tapley to Continue their warfarin regimen- see above in Anticoagulation Summary.  2. Follow up in 2 weeks  3. Pt has agreed to only be called if INR out of range. They have been instructed to call if any changes in medications, doses, concerns, etc. Patient expresses understanding and has no further questions at this time.    Regis Chu RPH

## 2019-03-27 LAB — INR PPP: 3.2

## 2019-03-28 ENCOUNTER — ANTICOAGULATION VISIT (OUTPATIENT)
Dept: PHARMACY | Facility: HOSPITAL | Age: 79
End: 2019-03-28

## 2019-03-28 DIAGNOSIS — Z95.2 MECHANICAL HEART VALVE PRESENT: ICD-10-CM

## 2019-03-28 DIAGNOSIS — Z95.2 H/O AORTIC VALVE REPLACEMENT: ICD-10-CM

## 2019-03-28 NOTE — PROGRESS NOTES
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    INR History:  Anticoagulation Monitoring 2/27/2019 3/15/2019 3/28/2019   INR 2.60 2.80 3.20   INR Date 2/27/2019 3/15/2019 3/27/2019   INR Goal 2.5-3.5 2.5-3.5 2.5-3.5   Trend Same Same Same   Last Week Total 42.5 mg 42.5 mg 42.5 mg   Next Week Total 42.5 mg 42.5 mg 42.5 mg   Sun 5 mg 5 mg 5 mg   Mon 7.5 mg 7.5 mg 7.5 mg   Tue 5 mg 5 mg 5 mg   Wed 7.5 mg 7.5 mg 7.5 mg   Thu 5 mg 5 mg 5 mg   Fri 7.5 mg 7.5 mg 7.5 mg   Sat 5 mg 5 mg 5 mg   Visit Report - - -   Some recent data might be hidden       Plan:  1. INR is therapeutic today- see above in Anticoagulation Summary.    Bobby L Tapley to continue their warfarin regimen- see above in Anticoagulation Summary.  2. Follow up in 2 weeks  3. Pt has agreed to only be called if INR out of range. They have been instructed to call if any changes in medications, doses, concerns, etc.    Layla Ortega Former[facility]

## 2019-04-09 RX ORDER — WARFARIN SODIUM 5 MG/1
TABLET ORAL
Qty: 120 TABLET | Refills: 2 | Status: SHIPPED | OUTPATIENT
Start: 2019-04-09 | End: 2020-01-13 | Stop reason: SDUPTHER

## 2019-04-11 ENCOUNTER — ANTICOAGULATION VISIT (OUTPATIENT)
Dept: PHARMACY | Facility: HOSPITAL | Age: 79
End: 2019-04-11

## 2019-04-11 DIAGNOSIS — Z95.2 MECHANICAL HEART VALVE PRESENT: ICD-10-CM

## 2019-04-11 DIAGNOSIS — Z95.2 H/O AORTIC VALVE REPLACEMENT: ICD-10-CM

## 2019-04-11 LAB — INR PPP: 3

## 2019-04-11 NOTE — PROGRESS NOTES
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    INR History:  Anticoagulation Monitoring 3/15/2019 3/28/2019 4/11/2019   INR 2.80 3.20 3.00   INR Date 3/15/2019 3/27/2019 4/11/2019   INR Goal 2.5-3.5 2.5-3.5 2.5-3.5   Trend Same Same Same   Last Week Total 42.5 mg 42.5 mg 42.5 mg   Next Week Total 42.5 mg 42.5 mg 42.5 mg   Sun 5 mg 5 mg 5 mg   Mon 7.5 mg 7.5 mg 7.5 mg   Tue 5 mg 5 mg 5 mg   Wed 7.5 mg 7.5 mg 7.5 mg   Thu 5 mg 5 mg 5 mg   Fri 7.5 mg 7.5 mg 7.5 mg   Sat 5 mg 5 mg 5 mg   Visit Report - - -   Some recent data might be hidden       Plan:  1. INR is therapeutic today- see above in Anticoagulation Summary.    Bobby L Tapley to continue their warfarin regimen- see above in Anticoagulation Summary.  2. Follow up in 2 weeks  3. Pt has agreed to only be called if INR out of range. They have been instructed to call if any changes in medications, doses, concerns, etc. Patient expresses understanding and has no further questions at this time.    Regis Chu RPH

## 2019-04-24 ENCOUNTER — ANTICOAGULATION VISIT (OUTPATIENT)
Dept: PHARMACY | Facility: HOSPITAL | Age: 79
End: 2019-04-24

## 2019-04-24 DIAGNOSIS — Z95.2 MECHANICAL HEART VALVE PRESENT: ICD-10-CM

## 2019-04-24 DIAGNOSIS — Z95.2 H/O AORTIC VALVE REPLACEMENT: ICD-10-CM

## 2019-04-24 LAB — INR PPP: 3.7

## 2019-04-24 NOTE — PROGRESS NOTES
Anticoagulation Clinic Progress Note    Anticoagulation Summary  As of 4/24/2019    INR goal:   2.5-3.5   TTR:   84.1 % (4.3 mo)   INR used for dosing:   3.70! 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  Diet: has remained unchanged.    Clinic Interview:      INR History:  Anticoagulation Monitoring 3/28/2019 4/11/2019 4/24/2019   INR 3.20 3.00 3.70   INR Date 3/27/2019 4/11/2019 4/24/2019   INR Goal 2.5-3.5 2.5-3.5 2.5-3.5   Trend Same Same Same   Last Week Total 42.5 mg 42.5 mg 42.5 mg   Next Week Total 42.5 mg 42.5 mg 42.5 mg   Sun 5 mg 5 mg 5 mg   Mon 7.5 mg 7.5 mg 7.5 mg   Tue 5 mg 5 mg 5 mg   Wed 7.5 mg 7.5 mg 7.5 mg   Thu 5 mg 5 mg 5 mg   Fri 7.5 mg 7.5 mg 7.5 mg   Sat 5 mg 5 mg 5 mg   Visit Report - - -   Some recent data might be hidden       Plan:  1. INR is Supratherapeutic today- see above in Anticoagulation Summary.   Will instruct Bobby L Tapley to Continue their warfarin regimen- see above in Anticoagulation Summary.  2. Follow up in 2 weeks  3. Pt has agreed to only be called if INR out of range. They have been instructed to call if any changes in medications, doses, concerns, etc. Patient expresses understanding and has no further questions at this  time.    Cassandra Lovell, [facility]

## 2019-05-09 ENCOUNTER — ANTICOAGULATION VISIT (OUTPATIENT)
Dept: PHARMACY | Facility: HOSPITAL | Age: 79
End: 2019-05-09

## 2019-05-09 DIAGNOSIS — Z95.2 H/O AORTIC VALVE REPLACEMENT: ICD-10-CM

## 2019-05-09 DIAGNOSIS — Z95.2 MECHANICAL HEART VALVE PRESENT: ICD-10-CM

## 2019-05-09 LAB — INR PPP: 6.5

## 2019-05-09 NOTE — PROGRESS NOTES
Anticoagulation Clinic Progress Note    Anticoagulation Summary  As of 2019    INR goal:   2.5-3.5   TTR:   75.3 % (4.8 mo)   INR used for dosin.50! 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]             Anticoagulation Episode Summary     INR check location:   Home Draw    Preferred lab:       Send INR reminders to:   [facility]  [facility]    Comments:   **[facility]**      Anticoagulation Care Providers     Provider Role Specialty Phone number    Donya Gagnon MD Referring Cardiology [phone]            Clinic Interview:  Patient Findings     Positives:   Change in health, Change in medications, Other complaints    Negatives:   Signs/symptoms of thrombosis, Signs/symptoms of bleeding,   Laboratory test error suspected, Change in alcohol use, Change in   activity, Upcoming invasive procedure, Emergency department visit,   Upcoming dental procedure, Missed doses, Extra doses, Change in   diet/appetite, Hospital admission, Bruising    Comments:   Worsening RA pain. To initiate today for RA: prednisone (15   mg daily x 1 wk, 10 mg daily x 1 wk, then 5 mg daily x 1 wk) + omeprazole   (both x 3 weeks).      Clinical Outcomes     Negatives:   Major bleeding event, Thromboembolic event,   Anticoagulation-related hospital admission, Anticoagulation-related ED   visit, Anticoagulation-related fatality    Comments:   Worsening RA pain. To initiate today for RA: prednisone (15   mg daily x 1 wk, 10 mg daily x 1 wk, then 5 mg daily x 1 wk) + omeprazole   (both x 3 weeks).        INR History:  Anticoagulation Monitoring 2019   INR 3.00 3.70 6.50    INR Date 4/11/2019 4/24/2019 5/9/2019   INR Goal 2.5-3.5 2.5-3.5 2.5-3.5   Trend Same Same Same   Last Week Total 42.5 mg 42.5 mg 42.5 mg   Next Week Total 42.5 mg 42.5 mg 30 mg   Sun 5 mg 5 mg 5 mg   Mon 7.5 mg 7.5 mg -   Tue 5 mg 5 mg -   Wed 7.5 mg 7.5 mg -   Thu 5 mg 5 mg Hold (5/9)   Fri 7.5 mg 7.5 mg Hold (5/10)   Sat 5 mg 5 mg 5 mg   Visit Report - - -   Some recent data might be hidden       Plan:  1. INR is Supratherapeutic today- see above in Anticoagulation Summary.   Will instruct Bobby L Tapley to Change their warfarin regimen- see above in Anticoagulation Summary.  2. Follow up in 4 days  3. They have been instructed to call if any changes in medications, doses, concerns, etc. Patient expresses understanding and has no further questions at this time. To seek immediate medical attention if bleeding noted or a fall occurs.    Regis Chu [facility]

## 2019-05-13 ENCOUNTER — ANTICOAGULATION VISIT (OUTPATIENT)
Dept: PHARMACY | Facility: HOSPITAL | Age: 79
End: 2019-05-13

## 2019-05-13 DIAGNOSIS — Z95.2 MECHANICAL HEART VALVE PRESENT: ICD-10-CM

## 2019-05-13 DIAGNOSIS — Z95.2 H/O AORTIC VALVE REPLACEMENT: ICD-10-CM

## 2019-05-13 LAB — INR PPP: 2.4

## 2019-05-13 NOTE — PROGRESS NOTES
Anticoagulation Clinic Progress Note    Anticoagulation Summary  As of 2019    INR goal:   2.5-3.5   TTR:   73.9 % (4.9 mo)   INR used for dosin.40! 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]             Anticoagulation Episode Summary     INR check location:   Home Draw    Preferred lab:       Send INR reminders to:   [facility]    Comments:   **[facility]**      Anticoagulation Care Providers     Provider Role Specialty Phone number    Donya Gagnon MD Referring Cardiology [phone]            Clinic Interview:  Patient Findings     Negatives:   Signs/symptoms of thrombosis, Signs/symptoms of bleeding,   Laboratory test error suspected, Change in health, Change in alcohol use,   Change in activity, Upcoming invasive procedure, Emergency department   visit, Upcoming dental procedure, Missed doses, Extra doses, Change in   medications, Change in diet/appetite, Hospital admission, Bruising, Other   complaints    Comments:   Continues on: prednisone (15 mg daily x 1 wk, 10 mg daily x 1   wk, then 5 mg daily x 1 wk) + omeprazole (both started 5/9/19 x 3 weeks)      Clinical Outcomes     Negatives:   Major bleeding event, Thromboembolic event,   Anticoagulation-related hospital admission, Anticoagulation-related ED   visit, Anticoagulation-related fatality    Comments:   Continues on: prednisone (15 mg daily x 1 wk, 10 mg daily x 1   wk, then 5 mg daily x 1 wk) + omeprazole (both started 5/9/19 x 3 weeks)        INR History:  Anticoagulation Monitoring 2019   INR 3.70 6.50 2.40   INR Date 2019   INR  Goal 2.5-3.5 2.5-3.5 2.5-3.5   Trend Same Same Same   Last Week Total 42.5 mg 42.5 mg 30 mg   Next Week Total 42.5 mg 30 mg 40 mg   Sun 5 mg 5 mg -   Mon 7.5 mg - 7.5 mg   Tue 5 mg - 5 mg   Wed 7.5 mg - 5 mg (5/15)   Thu 5 mg Hold (5/9) -   Fri 7.5 mg Hold (5/10) -   Sat 5 mg 5 mg -   Visit Report - - -   Some recent data might be hidden       Plan:  1. INR is Subtherapeutic today- see above in Anticoagulation Summary.   Will instruct Bobby L Tapley to Change their warfarin regimen- see above in Anticoagulation Summary.  2. Follow up in 3 days  3. Pt has agreed to only be called if INR out of range. They have been instructed to call if any changes in medications, doses, concerns, etc. Patient expresses understanding and has no further questions at this time.    Regis Chu [facility]

## 2019-05-13 NOTE — PATIENT INSTRUCTIONS
7.5 mg 5/13  5 mg 5/14  5 mg 5/15 (rather than 7.5 mg to help prevent high INR with prednisone/omeprazole)  Recheck INR 5/16

## 2019-05-16 ENCOUNTER — ANTICOAGULATION VISIT (OUTPATIENT)
Dept: PHARMACY | Facility: HOSPITAL | Age: 79
End: 2019-05-16

## 2019-05-16 DIAGNOSIS — Z95.2 H/O AORTIC VALVE REPLACEMENT: ICD-10-CM

## 2019-05-16 DIAGNOSIS — Z95.2 MECHANICAL HEART VALVE PRESENT: ICD-10-CM

## 2019-05-16 LAB — INR PPP: 3.5

## 2019-05-16 NOTE — PROGRESS NOTES
Anticoagulation Clinic Progress Note    Anticoagulation Summary  As of 5/16/2019    INR goal:   2.5-3.5   TTR:   74.3 % (5 mo)   INR used for dosing:   3.50 (5/16/2019)   Warfarin maintenance plan:   7.5 mg every Mon, Wed, Fri; 5 mg all other days   Weekly warfarin total:   42.5 mg   Plan last modified:   Brad Anderson [facility] (1/16/2019)   Next INR check:   5/20/2019   Priority:   Maintenance   Target end date:   Indefinite    Indications    H/O aortic valve replacement [Z95.2]  Mechanical heart valve present [Z95.2]             Anticoagulation Episode Summary     INR check location:   Home Draw    Preferred lab:       Send INR reminders to:    [facility]  POOL    Comments:   **[facility]**      Anticoagulation Care Providers     Provider Role Specialty Phone number    Donya Gagnon MD Referring Cardiology [phone]          Drug interactions:continues on prednisone  Diet: has remained unchanged.    Clinic Interview:      INR History:  Anticoagulation Monitoring 5/9/2019 5/13/2019 5/16/2019   INR 6.50 2.40 3.50   INR Date 5/9/2019 5/13/2019 5/16/2019   INR Goal 2.5-3.5 2.5-3.5 2.5-3.5   Trend Same Same Same   Last Week Total 42.5 mg 30 mg 27.5 mg   Next Week Total 30 mg 40 mg 37.5 mg   Sun 5 mg - 5 mg   Mon - 7.5 mg -   Tue - 5 mg -   Wed - 5 mg (5/15) -   Thu Hold (5/9) - 2.5 mg (5/16)   Fri Hold (5/10) - 5 mg (5/17)   Sat 5 mg - 5 mg   Visit Report - - -   Some recent data might be hidden       Plan:  1. INR is Therapeutic today- see above in Anticoagulation Summary.   Will instruct Song PIETRO GarciaTapley to Change their warfarin regimen- see above in Anticoagulation Summary.  2. Follow up in 4 days  3. Spoke with spouse Tonya. They have been instructed to call if any changes in medications, doses, concerns, etc. Patient expresses understanding and has no further questions at this time.    Carine Lisa [facility]

## 2019-05-20 LAB — INR PPP: 3.1

## 2019-05-21 ENCOUNTER — ANTICOAGULATION VISIT (OUTPATIENT)
Dept: PHARMACY | Facility: HOSPITAL | Age: 79
End: 2019-05-21

## 2019-05-21 DIAGNOSIS — Z95.2 H/O AORTIC VALVE REPLACEMENT: ICD-10-CM

## 2019-05-21 DIAGNOSIS — Z95.2 MECHANICAL HEART VALVE PRESENT: ICD-10-CM

## 2019-05-21 NOTE — PROGRESS NOTES
Anticoagulation Clinic Progress Note    Anticoagulation Summary  As of 5/21/2019    INR goal:   2.5-3.5   TTR:   74.9 % (5.1 mo)   INR used for dosing:   3.10 (5/20/2019)   Warfarin maintenance plan:   7.5 mg every Mon, Wed, Fri; 5 mg all other days   Weekly warfarin total:   42.5 mg   Plan last modified:   Brad Anderson [facility] (1/16/2019)   Next INR check:   5/24/2019   Priority:   Maintenance   Target end date:   Indefinite    Indications    H/O aortic valve replacement [Z95.2]  Mechanical heart valve present [Z95.2]             Anticoagulation Episode Summary     INR check location:   Home Draw    Preferred lab:       Send INR reminders to:    [facility]  POOL    Comments:   **[facility]**      Anticoagulation Care Providers     Provider Role Specialty Phone number    Donya Gagnon MD Referring Cardiology [phone]          Clinic Interview:  Continues on oral steroid and omeprazole. Will be seeing physisicn on Wednesday for more information on length of steroid therapy.     INR History:  Anticoagulation Monitoring 5/13/2019 5/16/2019 5/21/2019   INR 2.40 3.50 3.10   INR Date 5/13/2019 5/16/2019 5/20/2019   INR Goal 2.5-3.5 2.5-3.5 2.5-3.5   Trend Same Same Same   Last Week Total 30 mg 27.5 mg 32.5 mg   Next Week Total 40 mg 37.5 mg 40 mg   Sun - 5 mg -   Mon 7.5 mg - -   Tue 5 mg - 5 mg   Wed 5 mg (5/15) - 5 mg (5/22)   Thu - 2.5 mg (5/16) 5 mg   Fri - 5 mg (5/17) -   Sat - 5 mg -   Visit Report - - -   Some recent data might be hidden       Plan:  1. INR is therapeutic today- see above in Anticoagulation Summary. Spoke with Bobby L Tapley's wife. Instructed to take 5mg on Wed. Will recheck INR on Friday.   2. Follow up on 5/24  3. Pt has agreed to only be called if INR out of range. They have been instructed to call if any changes in medications, doses, concerns, etc. Family expresses understanding and has no further questions at this time.    Layla Ortega, [facility]

## 2019-05-28 LAB — INR PPP: 3.2

## 2019-05-29 ENCOUNTER — ANTICOAGULATION VISIT (OUTPATIENT)
Dept: PHARMACY | Facility: HOSPITAL | Age: 79
End: 2019-05-29

## 2019-05-29 DIAGNOSIS — Z95.2 MECHANICAL HEART VALVE PRESENT: ICD-10-CM

## 2019-05-29 DIAGNOSIS — Z95.2 H/O AORTIC VALVE REPLACEMENT: ICD-10-CM

## 2019-05-29 NOTE — PROGRESS NOTES
Anticoagulation Clinic Progress Note    Anticoagulation Summary  As of 5/29/2019    INR goal:   2.5-3.5   TTR:   76.2 % (5.4 mo)   INR used for dosing:   3.20 (5/28/2019)   Warfarin maintenance plan:   5 mg every day   Weekly warfarin total:   35 mg   Plan last modified:   Zonia Tejeda, [facility] (5/29/2019)   Next INR check:   6/5/2019   Priority:   Maintenance   Target end date:   Indefinite    Indications    H/O aortic valve replacement [Z95.2]  Mechanical heart valve present [Z95.2]             Anticoagulation Episode Summary     INR check location:   Home Draw    Preferred lab:       Send INR reminders to:    [facility]  POOL    Comments:   **[facility]**      Anticoagulation Care Providers     Provider Role Specialty Phone number    Donya Gagnon MD Referring Cardiology [phone]          Drug interactions: has remained unchanged.  Diet: has remained unchanged.    Clinic Interview:  No pertinent clinical findings have been reported.    INR History:  Anticoagulation Monitoring 5/16/2019 5/21/2019 5/29/2019   INR 3.50 3.10 3.20   INR Date 5/16/2019 5/20/2019 5/28/2019   INR Goal 2.5-3.5 2.5-3.5 2.5-3.5   Trend Same Same Down   Last Week Total 27.5 mg 32.5 mg 35 mg   Next Week Total 37.5 mg 40 mg 35 mg   Sun 5 mg - 5 mg   Mon - - 5 mg   Tue - 5 mg 5 mg   Wed - 5 mg (5/22) 5 mg   Thu 2.5 mg (5/16) 5 mg 5 mg   Fri 5 mg (5/17) - 5 mg   Sat 5 mg - 5 mg   Visit Report - - -   Some recent data might be hidden       Plan:  1. INR is Therapeutic today- see above in Anticoagulation Summary.   Called and spoke to wife. States that he has been taking 5mg daily since 5/9.   Would continue 5mg daily and check in 1wk, Still on steroids/omeprazole  see above in Anticoagulation Summary.  2. Follow up with home INR test  in 1 week.  3. Pt has agreed to only be called if INR out of range. They have been instructed to call if any changes in medications, doses, concerns, etc. Patient expresses  understanding and has no further questions at this time.    Zonia Tejeda, [facility]

## 2019-06-06 ENCOUNTER — ANTICOAGULATION VISIT (OUTPATIENT)
Dept: PHARMACY | Facility: HOSPITAL | Age: 79
End: 2019-06-06

## 2019-06-06 DIAGNOSIS — Z95.2 MECHANICAL HEART VALVE PRESENT: ICD-10-CM

## 2019-06-06 DIAGNOSIS — Z95.2 H/O AORTIC VALVE REPLACEMENT: ICD-10-CM

## 2019-06-06 LAB — INR PPP: 2.7

## 2019-06-06 NOTE — PROGRESS NOTES
Anticoagulation Clinic Progress Note    Anticoagulation Summary  As of 2019    INR goal:   2.5-3.5   TTR:   77.3 % (5.7 mo)   INR used for dosin.70 (2019)   Warfarin maintenance plan:   5 mg every day   Weekly warfarin total:   35 mg   No change documented:   Laurie Harvey   Plan last modified:   Zonia Tejeda, [facility] (2019)   Next INR check:   2019   Priority:   Maintenance   Target end date:   Indefinite    Indications    H/O aortic valve replacement [Z95.2]  Mechanical heart valve present [Z95.2]             Anticoagulation Episode Summary     INR check location:   Home Draw    Preferred lab:       Send INR reminders to:    [facility]  POOL    Comments:   **[facility]**      Anticoagulation Care Providers     Provider Role Specialty Phone number    Donya Gagnon MD Referring Cardiology [phone]          Clinic Interview:  No pertinent clinical findings have been reported.    INR History:  Anticoagulation Monitoring 2019   INR 3.10 3.20 2.70   INR Date 2019   INR Goal 2.5-3.5 2.5-3.5 2.5-3.5   Trend Same Down Same   Last Week Total 32.5 mg 35 mg 35 mg   Next Week Total 40 mg 35 mg 35 mg   Sun - 5 mg 5 mg   Mon - 5 mg 5 mg   Tue 5 mg 5 mg 5 mg   Wed 5 mg () 5 mg 5 mg   Thu 5 mg 5 mg 5 mg   Fri - 5 mg 5 mg   Sat - 5 mg 5 mg   Visit Report - - -   Some recent data might be hidden       Plan:  1. INR is therapeutic today- see above in Anticoagulation Summary.    Bobby L Tapley to continue their warfarin regimen- see above in Anticoagulation Summary.  2. Follow up in 2 weeks  3. Pt has agreed to only be called if INR out of range. They have been instructed to call if any changes in medications, doses, concerns, etc. Patient expresses understanding and has no further questions at this time.    Laurie Harvey

## 2019-06-10 ENCOUNTER — OFFICE VISIT (OUTPATIENT)
Dept: SLEEP MEDICINE | Facility: HOSPITAL | Age: 79
End: 2019-06-10
Attending: INTERNAL MEDICINE

## 2019-06-10 VITALS
SYSTOLIC BLOOD PRESSURE: 112 MMHG | WEIGHT: 143 LBS | OXYGEN SATURATION: 93 % | BODY MASS INDEX: 20.02 KG/M2 | DIASTOLIC BLOOD PRESSURE: 74 MMHG | HEIGHT: 71 IN | HEART RATE: 91 BPM

## 2019-06-10 DIAGNOSIS — G47.33 OSA ON CPAP: Primary | ICD-10-CM

## 2019-06-10 DIAGNOSIS — I25.10 CORONARY ARTERY DISEASE INVOLVING NATIVE CORONARY ARTERY OF NATIVE HEART WITHOUT ANGINA PECTORIS: ICD-10-CM

## 2019-06-10 DIAGNOSIS — G47.34 SLEEP RELATED HYPOXIA: ICD-10-CM

## 2019-06-10 DIAGNOSIS — Z99.89 OSA ON CPAP: Primary | ICD-10-CM

## 2019-06-10 PROCEDURE — G0463 HOSPITAL OUTPT CLINIC VISIT: HCPCS

## 2019-06-10 NOTE — PROGRESS NOTES
Sleep Disorder Follow Up    Patient Name: Bobby L Tapley  Age/Sex: 79 y.o. male  : 1940  MRN: [id number]    Date of Encounter Visit: 06/10/19   Referring Provider: Reji Blanco MD  Place of Service: [facility]  Patient Care Team:  Serenity Adam MD as PCP - General (Family Medicine)  Serenity Adam MD as PCP - Claims Attributed    PROBLEM LIST:  1. Moderate Obstructive sleep apnea  2. Sleep related hypoxia  3. Hx of CAD s/p CABG   4. HTN  5. Valvular heart disease s/p AVR  6. Dysphagia   7. Weight loss  8. Nocturia    History of Present Illness:  Bobby L Tapley is a 79 y.o. male who was last seen in the office on 18 for a history of moderate JOY, sleep related hypoxia, CAD, valvular heart disease and hypertension. He had a sleep study on 18 that showed mild to moderate JOY with an overall AHI of 14.3 and RDI 22.3. Patient had mild sleep related hypoxia with mckinley desaturation of 78.9% with 8.9 minute spent at less than 89%. He was started on auto CPAP 6-20 cmH20.      He did loose a lot of weight since last visit, was diagnosed with RA and is doing more testing and was started on steroids while doing some more testing    Since last visit, he has done a better job about keeping the mask on when he goes to the bathroom and disconnecting the mask from hose.  His compliance has almost doubled.   Patient is on CPAP but he is no longer using it.  He complains that he can not sleep with it on, and the air leak sound bothers him  Patient uses a nasal pillow mask, which does fit well. However he still did not like it  He does wake up at night to use the rest room because of his BPH and the CPAP was making that even more incovenient  Patient's equipment supplier is [employer] and last mask change was about 3 months ago.  Patient reports that he no longer sleep better on the CPAP  Current CPAP setting 6-20 cm H20.  [place] Sleepiness Scale (ESS) is 5.  Weight is down by 6  pounds since last visit.  Other comorbidities include hypertension, hyperlipidemia, CAD s/p CABG and mechanical AVR on anticoagulation, and Rheumatoid arthritis      Review of Systems:   A ten-system review was conducted and was negative except for anxiety and nasal congestion.  Please refer to the follow up sleep questionnaire page one for details.    Past Medical History:  Past medical, surgical, social, and family history, except as mentioned above, was unchanged from the last visit.     Past Medical History:   Diagnosis Date   • Aneurysm of aortic arch (CMS/HCC)    • Aortic insufficiency    • Aortic stenosis    • Arthritis    • Ascending aortic aneurysm (CMS/HCC)    • CAD (coronary artery disease)    • Enlarged prostate    • H/O aortic valve replacement    • H/O echocardiogram 02/10/2015   • Health care maintenance    • History of EKG 07/07/2015   • Hyperlipidemia    • Hypertension    • Kidney stones        Past Surgical History:   Procedure Laterality Date   • AORTIC VALVE REPAIR/REPLACEMENT     • CARDIAC CATHETERIZATION  11/27/2013   • COLONOSCOPY N/A 8/15/2017    Procedure: COLONOSCOPY;  Surgeon: Hu Bergman MD;  Location: [facility] ENDOSCOPY;  Service:    • CORONARY ARTERY BYPASS GRAFT     • ROTATOR CUFF REPAIR         Home Medications:     Current Outpatient Medications:   •  aspirin 81 MG tablet, Take 81 mg by mouth Daily., Disp: , Rfl:   •  enalapril (VASOTEC) 2.5 MG tablet, Take 2.5 mg by mouth., Disp: , Rfl:   •  finasteride (PROSCAR) 5 MG tablet, Take 5 mg by mouth., Disp: , Rfl:   •  HYDROcodone-acetaminophen (NORCO) 5-325 MG per tablet, Take 1 tablet by mouth as needed., Disp: , Rfl:   •  L-Lysine 500 MG capsule, Take 1 capsule by mouth As Needed., Disp: , Rfl:   •  Multiple Vitamins-Minerals (CENTRUM SILVER) tablet, Take 1 tablet by mouth Daily., Disp: , Rfl:   •  warfarin (COUMADIN) 5 MG tablet, Take 1.5 tablets (7.5 mg) by mouth on Mon, Wed, and Fri. Take 1 tablet (5 mg) on all other days or as  "directed by [facility], Disp: 120 tablet, Rfl: 2    Allergies:  No Known Allergies    Past Social History:  Social History     Socioeconomic History   • Marital status:      Spouse name: Not on file   • Number of children: Not on file   • Years of education: Not on file   • Highest education level: Not on file   Occupational History   • Occupation: retired   Tobacco Use   • Smoking status: Never Smoker   • Smokeless tobacco: Never Used   Substance and Sexual Activity   • Alcohol use: No     Comment: caffeine use   • Drug use: No   • Sexual activity: Defer       Past Family History:  Family History   Problem Relation Age of Onset   • Cancer Mother    • Aneurysm Father    • Heart disease Father    • Aneurysm Brother    • Heart disease Brother    • Hypertension Cousin    • Obesity Cousin    • Lung cancer Cousin    • Malig Hyperthermia Neg Hx          Objective:   Done and documented on sleep disorders center physical examination sheet, please refer to hand written note on the chart for details about the other pertinent negative findings.    Vital Signs:   Visit Vitals  /74   Pulse 91   Ht 180.3 cm (71\")   Wt 64.9 kg (143 lb)   SpO2 93%   BMI 19.94 kg/m²     Wt Readings from Last 3 Encounters:   06/10/19 64.9 kg (143 lb)   11/21/18 64.4 kg (142 lb)   09/26/18 66.2 kg (146 lb)          Physical Exam:   General: AAOx3. Normal mood and affect.   HEENT:  Moist mucous membranes.  Septum midline. Mallampati 2 airway.    LUNGS: Non-labored breathing. CTAB. No wheezes.  HEART: Regular rate and rhythm. 2/6 murmur. Loud S2 click.  EXTREMITIES: no edema. No cyanosis or clubbing. Normal gait    Diagnostic Data:  Polysomnography on 12/4/17 showed mild to moderate JOY with overall AHI of 14.3 and RDI of 22.3. Chad desaturation of 78.9% with 8.9 minutes spent below 89%. Arousal index 32.3.  Latency to sleep onset was 11.2 minutes and latency to REM was 71.5 minutes.  Patient had 3 cycles of REM non-REM sleep that " were  by a long stretch of WASO. no significant REM component, patient was in the supine position throughout. Severe sleep fragmentation with arousal index of 32.3 however most arousals were spontaneous.No significant periodic limb movement disorder. Started on auto CPAP 6-20 cmH20.     Compliance download from 12/11/2018-1/9/2019 showed overall compliance of 6.7% with average use of 16 sec per night.  Residual AHI is 0 on auto CPAP 6-20 cm H2O. Overall he is not using it.    Assessment and Plan:       ICD-10-CM ICD-9-CM   1. JOY on CPAP G47.33 327.23    Z99.89 V46.8   2. Sleep related hypoxia G47.34 327.24   3. Coronary artery disease involving native coronary artery of native heart without angina pectoris I25.10 414.01       Recommendations:      patient is no longer using the CPAP  The inconvenience and the discomfort from the machine is causing him to stay up all night and he is getting frustrated and decided not to use it anymore.  We had a detailed discussion about the risk of sleep apnea if left untreated on the cardiovascular system, and any discontinuation of the CPAP come with the price of slight increase in his risk for cardiovascular accidents including but not limited to uncontrolled hypertension, arrhythmia, worsening heart failure, atherosclerotic heart disease and cerebrovascular accidents.  If down the road the patient had worsening cardiovascular problems and would rather go back on a CPAP then he is more than welcome to set up a follow-up or we can work more with the mask fitting and try to get him to be as comfortable on it as possible so that he can have some subjective as well as clinical benefit from the CPAP.  We will discontinue the CPAP based on the patient request at this point  We will follow-up as needed down the road if the patient decided to go back on the CPAP machine  Patient verbalized understanding the risk taken by coming off the treatment, his sleep apnea is mild to  moderate so he is risk increase is not substantial yet it is still a risk    No orders of the defined types were placed in this encounter.    No Follow-up on file.    Reji Blanco MD   [facility]   06/10/19  11:53 AM    Dictated utilizing Dragon dictation:  Much of this encounter note is an electronic transcription/translation of spoken language to printed text. The electronic translation of spoken language may permit erroneous, or at times, nonsensical words or phrases to be inadvertently transcribed; Although I have reviewed the note for such errors, some may still exist.

## 2019-06-21 ENCOUNTER — ANTICOAGULATION VISIT (OUTPATIENT)
Dept: PHARMACY | Facility: HOSPITAL | Age: 79
End: 2019-06-21

## 2019-06-21 DIAGNOSIS — Z95.2 MECHANICAL HEART VALVE PRESENT: ICD-10-CM

## 2019-06-21 DIAGNOSIS — Z95.2 H/O AORTIC VALVE REPLACEMENT: ICD-10-CM

## 2019-06-21 LAB — INR PPP: 2.5

## 2019-06-21 NOTE — PROGRESS NOTES
Anticoagulation Clinic Progress Note    Anticoagulation Summary  As of 2019    INR goal:   2.5-3.5   TTR:   79.3 % (6.2 mo)   INR used for dosin.50 (2019)   Warfarin maintenance plan:   5 mg every day   Weekly warfarin total:   35 mg   No change documented:   Amparo Echeverria   Plan last modified:   Zonia Tejeda, [facility] (2019)   Next INR check:   2019   Priority:   Maintenance   Target end date:   Indefinite    Indications    H/O aortic valve replacement [Z95.2]  Mechanical heart valve present [Z95.2]             Anticoagulation Episode Summary     INR check location:   Home Draw    Preferred lab:       Send INR reminders to:    [facility]  POOL    Comments:   **[facility]**      Anticoagulation Care Providers     Provider Role Specialty Phone number    Donya Gagnon MD Referring Cardiology [phone]          Clinic Interview:  No pertinent clinical findings have been reported.    INR History:  Anticoagulation Monitoring 2019   INR 3.20 2.70 2.50   INR Date 2019   INR Goal 2.5-3.5 2.5-3.5 2.5-3.5   Trend Down Same Same   Last Week Total 35 mg 35 mg 35 mg   Next Week Total 35 mg 35 mg 35 mg   Sun 5 mg 5 mg 5 mg   Mon 5 mg 5 mg 5 mg   Tue 5 mg 5 mg 5 mg   Wed 5 mg 5 mg 5 mg   Thu 5 mg 5 mg 5 mg   Fri 5 mg 5 mg 5 mg   Sat 5 mg 5 mg 5 mg   Visit Report - - -   Some recent data might be hidden       Plan:  1. INR is therapeutic today- see above in Anticoagulation Summary.    Bobby L Tapley to continue their warfarin regimen- see above in Anticoagulation Summary.  2. Follow up in 2 weeks  3. Pt has agreed to only be called if INR out of range. They have been instructed to call if any changes in medications, doses, concerns, etc. Patient expresses understanding and has no further questions at this time.    Amparo Echeverria

## 2019-07-03 ENCOUNTER — ANTICOAGULATION VISIT (OUTPATIENT)
Dept: PHARMACY | Facility: HOSPITAL | Age: 79
End: 2019-07-03

## 2019-07-03 DIAGNOSIS — Z95.2 MECHANICAL HEART VALVE PRESENT: ICD-10-CM

## 2019-07-03 DIAGNOSIS — Z95.2 H/O AORTIC VALVE REPLACEMENT: ICD-10-CM

## 2019-07-03 LAB — INR PPP: 2.5

## 2019-07-03 NOTE — PROGRESS NOTES
Anticoagulation Clinic Progress Note    Anticoagulation Summary  As of 7/3/2019    INR goal:   2.5-3.5   TTR:   80.5 % (6.6 mo)   INR used for dosin.50 (7/3/2019)   Warfarin maintenance plan:   5 mg every day   Weekly warfarin total:   35 mg   No change documented:   Kayleen Rodriguez   Plan last modified:   Zonia Tejeda [facility] (2019)   Next INR check:   2019   Priority:   Maintenance   Target end date:   Indefinite    Indications    H/O aortic valve replacement [Z95.2]  Mechanical heart valve present [Z95.2]             Anticoagulation Episode Summary     INR check location:   Home Draw    Preferred lab:       [facility]    Comments:   **[facility]**      Anticoagulation Care Providers     Provider Role Specialty Phone number    Donya Gagnon MD Referring Cardiology [phone]          Clinic Interview:  No pertinent clinical findings have been reported.    INR History:  Anticoagulation Monitoring 2019 2019 7/3/2019   INR 2.70 2.50 2.50   INR Date 2019 2019 7/3/2019   INR Goal 2.5-3.5 2.5-3.5 2.5-3.5   Trend Same Same Same   Last Week Total 35 mg 35 mg 35 mg   Next Week Total 35 mg 35 mg 35 mg   Sun 5 mg 5 mg 5 mg   Mon 5 mg 5 mg 5 mg   Tue 5 mg 5 mg 5 mg   Wed 5 mg 5 mg 5 mg   Thu 5 mg 5 mg 5 mg   Fri 5 mg 5 mg 5 mg   Sat 5 mg 5 mg 5 mg   Visit Report - - -   Some recent data might be hidden       Plan:  1. INR is therapeutic today- see above in Anticoagulation Summary.    Bobby L Tapley to continue their warfarin regimen- see above in Anticoagulation Summary.  2. Follow up in 2 weeks  3. Pt has agreed to only be called if INR out of range. They have been instructed to call if any changes in medications, doses, concerns, etc. Patient expresses understanding and has no further questions at this time.    Kayleen Rodriguez

## 2019-07-17 ENCOUNTER — ANTICOAGULATION VISIT (OUTPATIENT)
Dept: PHARMACY | Facility: HOSPITAL | Age: 79
End: 2019-07-17

## 2019-07-17 DIAGNOSIS — Z95.2 H/O AORTIC VALVE REPLACEMENT: ICD-10-CM

## 2019-07-17 DIAGNOSIS — Z95.2 MECHANICAL HEART VALVE PRESENT: ICD-10-CM

## 2019-07-17 LAB — INR PPP: 2.6

## 2019-07-17 NOTE — PROGRESS NOTES
Anticoagulation Clinic Progress Note    Anticoagulation Summary  As of 2019    INR goal:   2.5-3.5   TTR:   81.8 % (7.1 mo)   INR used for dosin.60 (2019)   Warfarin maintenance plan:   5 mg every day   Weekly warfarin total:   35 mg   No change documented:   Laurie Harvey   Plan last modified:   Zonia Tejeda, [facility] (2019)   Next INR check:   2019   Priority:   Maintenance   Target end date:   Indefinite    Indications    H/O aortic valve replacement [Z95.2]  Mechanical heart valve present [Z95.2]             Anticoagulation Episode Summary     INR check location:   Home Draw    Preferred lab:       Send INR reminders to:    KYE [facility]PTON  POOL    Comments:   **[facility]**      Anticoagulation Care Providers     Provider Role Specialty Phone number    Donya Gagnon MD Referring Cardiology [phone]          Clinic Interview:  No pertinent clinical findings have been reported.    INR History:  Anticoagulation Monitoring 2019 7/3/2019 2019   INR 2.50 2.50 2.60   INR Date 2019 7/3/2019 2019   INR Goal 2.5-3.5 2.5-3.5 2.5-3.5   Trend Same Same Same   Last Week Total 35 mg 35 mg 35 mg   Next Week Total 35 mg 35 mg 35 mg   Sun 5 mg 5 mg 5 mg   Mon 5 mg 5 mg 5 mg   Tue 5 mg 5 mg 5 mg   Wed 5 mg 5 mg 5 mg   Thu 5 mg 5 mg 5 mg   Fri 5 mg 5 mg 5 mg   Sat 5 mg 5 mg 5 mg   Visit Report - - -   Some recent data might be hidden       Plan:  1. INR is therapeutic today- see above in Anticoagulation Summary.    Bobby L Tapley to continue their warfarin regimen- see above in Anticoagulation Summary.  2. Follow up in 2 weeks  3. Pt has agreed to only be called if INR out of range. They have been instructed to call if any changes in medications, doses, concerns, etc. Patient expresses understanding and has no further questions at this time.    Laurie Harvey

## 2019-07-31 LAB — INR PPP: 3

## 2019-08-01 ENCOUNTER — ANTICOAGULATION VISIT (OUTPATIENT)
Dept: PHARMACY | Facility: HOSPITAL | Age: 79
End: 2019-08-01

## 2019-08-01 DIAGNOSIS — Z95.2 H/O AORTIC VALVE REPLACEMENT: ICD-10-CM

## 2019-08-01 DIAGNOSIS — Z95.2 MECHANICAL HEART VALVE PRESENT: ICD-10-CM

## 2019-08-01 NOTE — PROGRESS NOTES
Anticoagulation Clinic Progress Note    Anticoagulation Summary  As of 8/1/2019    INR goal:   2.5-3.5   TTR:   82.9 % (7.5 mo)   INR used for dosing:   3.00 (7/31/2019)   Warfarin maintenance plan:   5 mg every day   Weekly warfarin total:   35 mg   No change documented:   Kayleen Rodriguez   Plan last modified:   Zonia Tejeda [facility] (5/29/2019)   Next INR check:   8/14/2019   Priority:   Maintenance   Target end date:   Indefinite    Indications    H/O aortic valve replacement [Z95.2]  Mechanical heart valve present [Z95.2]             Anticoagulation Episode Summary     INR check location:   Home Draw    Preferred lab:       [facility]    [facility]    Comments:   **[facility]**      Anticoagulation Care Providers     Provider Role Specialty Phone number    Donya Gagnon MD Referring Cardiology [phone]          Clinic Interview:  No pertinent clinical findings have been reported.    INR History:  Anticoagulation Monitoring 7/3/2019 7/17/2019 8/1/2019   INR 2.50 2.60 3.00   INR Date 7/3/2019 7/17/2019 7/31/2019   INR Goal 2.5-3.5 2.5-3.5 2.5-3.5   Trend Same Same Same   Last Week Total 35 mg 35 mg 35 mg   Next Week Total 35 mg 35 mg 35 mg   Sun 5 mg 5 mg 5 mg   Mon 5 mg 5 mg 5 mg   Tue 5 mg 5 mg 5 mg   Wed 5 mg 5 mg 5 mg   Thu 5 mg 5 mg 5 mg   Fri 5 mg 5 mg 5 mg   Sat 5 mg 5 mg 5 mg   Visit Report - - -   Some recent data might be hidden       Plan:  1. INR is therapeutic today- see above in Anticoagulation Summary.    Bobby L Tapley to continue their warfarin regimen- see above in Anticoagulation Summary.  2. Follow up in 2 weeks  3. Pt has agreed to only be called if INR out of range. They have been instructed to call if any changes in medications, doses, concerns, etc. Patient expresses understanding and has no further questions at this time.    Kayleen Rodriguez

## 2019-08-14 ENCOUNTER — ANTICOAGULATION VISIT (OUTPATIENT)
Dept: PHARMACY | Facility: HOSPITAL | Age: 79
End: 2019-08-14

## 2019-08-14 DIAGNOSIS — Z95.2 MECHANICAL HEART VALVE PRESENT: ICD-10-CM

## 2019-08-14 DIAGNOSIS — Z95.2 H/O AORTIC VALVE REPLACEMENT: ICD-10-CM

## 2019-08-14 LAB — INR PPP: 3.2

## 2019-08-14 NOTE — PROGRESS NOTES
Anticoagulation Clinic Progress Note    Anticoagulation Summary  As of 8/14/2019    INR goal:   2.5-3.5   TTR:   83.9 % (8 mo)   INR used for dosing:   3.20 (8/14/2019)   Warfarin maintenance plan:   5 mg every day   Weekly warfarin total:   35 mg   No change documented:   Laurie Harvey   Plan last modified:   Zonia Tejeda, [facility] (5/29/2019)   Next INR check:   8/28/2019   Priority:   Maintenance   Target end date:   Indefinite    Indications    H/O aortic valve replacement [Z95.2]  Mechanical heart valve present [Z95.2]             Anticoagulation Episode Summary     INR check location:   Home Draw    Preferred lab:       Send INR reminders to:    KYE [facility]  POOL    Comments:   **[facility]**      Anticoagulation Care Providers     Provider Role Specialty Phone number    Donya Gagnon MD Referring Cardiology [phone]          Clinic Interview:  No pertinent clinical findings have been reported.    INR History:  Anticoagulation Monitoring 7/17/2019 8/1/2019 8/14/2019   INR 2.60 3.00 3.20   INR Date 7/17/2019 7/31/2019 8/14/2019   INR Goal 2.5-3.5 2.5-3.5 2.5-3.5   Trend Same Same Same   Last Week Total 35 mg 35 mg 35 mg   Next Week Total 35 mg 35 mg 35 mg   Sun 5 mg 5 mg 5 mg   Mon 5 mg 5 mg 5 mg   Tue 5 mg 5 mg 5 mg   Wed 5 mg 5 mg 5 mg   Thu 5 mg 5 mg 5 mg   Fri 5 mg 5 mg 5 mg   Sat 5 mg 5 mg 5 mg   Visit Report - - -   Some recent data might be hidden       Plan:  1. INR is therapeutic today- see above in Anticoagulation Summary.    Bobby L Tapley to continue their warfarin regimen- see above in Anticoagulation Summary.  2. Follow up in 2 weeks  3. Pt has agreed to only be called if INR out of range. They have been instructed to call if any changes in medications, doses, concerns, etc. Patient expresses understanding and has no further questions at this time.    Laurie Harvey

## 2019-08-28 ENCOUNTER — ANTICOAGULATION VISIT (OUTPATIENT)
Dept: PHARMACY | Facility: HOSPITAL | Age: 79
End: 2019-08-28

## 2019-08-28 DIAGNOSIS — Z95.2 H/O AORTIC VALVE REPLACEMENT: ICD-10-CM

## 2019-08-28 DIAGNOSIS — Z95.2 MECHANICAL HEART VALVE PRESENT: ICD-10-CM

## 2019-08-28 LAB — INR PPP: 3

## 2019-08-28 NOTE — PROGRESS NOTES
Anticoagulation Clinic Progress Note    Anticoagulation Summary  As of 8/28/2019    INR goal:   2.5-3.5   TTR:   84.8 % (8.5 mo)   INR used for dosing:   3.00 (8/28/2019)   Warfarin maintenance plan:   5 mg every day   Weekly warfarin total:   35 mg   No change documented:   Laurie Harvey   Plan last modified:   Zonia Tejeda, [facility] (5/29/2019)   Next INR check:   9/11/2019   Priority:   Maintenance   Target end date:   Indefinite    Indications    H/O aortic valve replacement [Z95.2]  Mechanical heart valve present [Z95.2]             Anticoagulation Episode Summary     INR check location:   Home Draw    Preferred lab:       Send INR reminders to:    KYE [facility]PTON  POOL    Comments:   **[facility]**      Anticoagulation Care Providers     Provider Role Specialty Phone number    Donya Gagnon MD Referring Cardiology [phone]          Clinic Interview:  No pertinent clinical findings have been reported.    INR History:  Anticoagulation Monitoring 8/1/2019 8/14/2019 8/28/2019   INR 3.00 3.20 3.00   INR Date 7/31/2019 8/14/2019 8/28/2019   INR Goal 2.5-3.5 2.5-3.5 2.5-3.5   Trend Same Same Same   Last Week Total 35 mg 35 mg 35 mg   Next Week Total 35 mg 35 mg 35 mg   Sun 5 mg 5 mg 5 mg   Mon 5 mg 5 mg 5 mg   Tue 5 mg 5 mg 5 mg   Wed 5 mg 5 mg 5 mg   Thu 5 mg 5 mg 5 mg   Fri 5 mg 5 mg 5 mg   Sat 5 mg 5 mg 5 mg   Visit Report - - -   Some recent data might be hidden       Plan:  1. INR is therapeutic today- see above in Anticoagulation Summary.    Bobby L Tapley to continue their warfarin regimen- see above in Anticoagulation Summary.  2. Follow up in 2 weeks  3. Pt has agreed to only be called if INR out of range. They have been instructed to call if any changes in medications, doses, concerns, etc. Patient expresses understanding and has no further questions at this time.    Laurie Harvey

## 2019-09-11 ENCOUNTER — ANTICOAGULATION VISIT (OUTPATIENT)
Dept: PHARMACY | Facility: HOSPITAL | Age: 79
End: 2019-09-11

## 2019-09-11 DIAGNOSIS — Z95.2 MECHANICAL HEART VALVE PRESENT: ICD-10-CM

## 2019-09-11 DIAGNOSIS — Z95.2 H/O AORTIC VALVE REPLACEMENT: ICD-10-CM

## 2019-09-11 LAB — INR PPP: 3

## 2019-09-11 NOTE — PROGRESS NOTES
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    INR History:  Anticoagulation Monitoring 8/14/2019 8/28/2019 9/11/2019   INR 3.20 3.00 3.00   INR Date 8/14/2019 8/28/2019 9/11/2019   INR Goal 2.5-3.5 2.5-3.5 2.5-3.5   Trend Same Same Same   Last Week Total 35 mg 35 mg 35 mg   Next Week Total 35 mg 35 mg 35 mg   Sun 5 mg 5 mg 5 mg   Mon 5 mg 5 mg 5 mg   Tue 5 mg 5 mg 5 mg   Wed 5 mg 5 mg 5 mg   Thu 5 mg 5 mg 5 mg   Fri 5 mg 5 mg 5 mg   Sat 5 mg 5 mg 5 mg   Visit Report - - -   Some recent data might be hidden       Plan:  1. INR is therapeutic today- see above in Anticoagulation Summary.    Bobby L Tapley to continue their warfarin regimen- see above in Anticoagulation Summary.  2. Follow up in 2 weeks  3. Pt has agreed to only be called if INR out of range. They have been instructed to call if any changes in medications, doses, concerns, etc. Patient expresses understanding and has no further questions at this time.    Kayleen Rodriguez

## 2019-09-25 LAB — INR PPP: 3.2

## 2019-09-26 ENCOUNTER — ANTICOAGULATION VISIT (OUTPATIENT)
Dept: PHARMACY | Facility: HOSPITAL | Age: 79
End: 2019-09-26

## 2019-09-26 DIAGNOSIS — Z95.2 H/O AORTIC VALVE REPLACEMENT: ICD-10-CM

## 2019-09-26 DIAGNOSIS — Z95.2 MECHANICAL HEART VALVE PRESENT: ICD-10-CM

## 2019-09-26 NOTE — PROGRESS NOTES
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    INR History:  Anticoagulation Monitoring 8/28/2019 9/11/2019 9/26/2019   INR 3.00 3.00 3.20   INR Date 8/28/2019 9/11/2019 9/25/2019   INR Goal 2.5-3.5 2.5-3.5 2.5-3.5   Trend Same Same Same   Last Week Total 35 mg 35 mg 35 mg   Next Week Total 35 mg 35 mg 35 mg   Sun 5 mg 5 mg 5 mg   Mon 5 mg 5 mg 5 mg   Tue 5 mg 5 mg 5 mg   Wed 5 mg 5 mg 5 mg   Thu 5 mg 5 mg 5 mg   Fri 5 mg 5 mg 5 mg   Sat 5 mg 5 mg 5 mg   Visit Report - - -   Some recent data might be hidden       Plan:  1. INR is therapeutic today- see above in Anticoagulation Summary.    Bobby L Tapley to continue their warfarin regimen- see above in Anticoagulation Summary.  2. Follow up in 2 weeks  3. Pt has agreed to only be called if INR out of range. They have been instructed to call if any changes in medications, doses, concerns, etc. Patient expresses understanding and has no further questions at this time.    Regis Chu RPH

## 2019-09-30 ENCOUNTER — OFFICE VISIT (OUTPATIENT)
Dept: CARDIOLOGY | Facility: CLINIC | Age: 79
End: 2019-09-30

## 2019-09-30 VITALS
HEIGHT: 71 IN | HEART RATE: 89 BPM | DIASTOLIC BLOOD PRESSURE: 70 MMHG | WEIGHT: 156.6 LBS | SYSTOLIC BLOOD PRESSURE: 120 MMHG | BODY MASS INDEX: 21.92 KG/M2

## 2019-09-30 DIAGNOSIS — R07.2 PRECORDIAL PAIN: Primary | ICD-10-CM

## 2019-09-30 DIAGNOSIS — R06.02 SHORTNESS OF BREATH: ICD-10-CM

## 2019-09-30 DIAGNOSIS — I25.10 CORONARY ARTERY DISEASE INVOLVING NATIVE CORONARY ARTERY OF NATIVE HEART WITHOUT ANGINA PECTORIS: ICD-10-CM

## 2019-09-30 DIAGNOSIS — G47.33 OSA (OBSTRUCTIVE SLEEP APNEA): ICD-10-CM

## 2019-09-30 DIAGNOSIS — Z95.2 H/O AORTIC VALVE REPLACEMENT: ICD-10-CM

## 2019-09-30 DIAGNOSIS — I10 ESSENTIAL HYPERTENSION: ICD-10-CM

## 2019-09-30 DIAGNOSIS — I71.22 AORTIC ARCH ANEURYSM (HCC): ICD-10-CM

## 2019-09-30 DIAGNOSIS — R94.31 ABNORMAL EKG: ICD-10-CM

## 2019-09-30 PROCEDURE — 93000 ELECTROCARDIOGRAM COMPLETE: CPT | Performed by: NURSE PRACTITIONER

## 2019-09-30 PROCEDURE — 99214 OFFICE O/P EST MOD 30 MIN: CPT | Performed by: NURSE PRACTITIONER

## 2019-09-30 RX ORDER — OMEPRAZOLE 20 MG/1
20 CAPSULE, DELAYED RELEASE ORAL DAILY
COMMUNITY

## 2019-09-30 RX ORDER — PREDNISONE 1 MG/1
5 TABLET ORAL DAILY
COMMUNITY
End: 2020-02-07

## 2019-09-30 RX ORDER — CAL/D3/MAG11/ZINC/COP/MANG/BOR 600 MG-800
1 TABLET ORAL DAILY
COMMUNITY
End: 2021-10-27 | Stop reason: HOSPADM

## 2019-09-30 RX ORDER — MIRTAZAPINE 15 MG/1
15 TABLET, FILM COATED ORAL NIGHTLY
COMMUNITY
End: 2020-02-07 | Stop reason: ALTCHOICE

## 2019-09-30 RX ORDER — ASCORBIC ACID 500 MG
1000 TABLET ORAL DAILY
COMMUNITY
End: 2021-10-27 | Stop reason: HOSPADM

## 2019-09-30 NOTE — PROGRESS NOTES
Date of Office Visit: 2019  Encounter Provider: ABBY Lorenzo  Place of Service: [facility] CARDIOLOGY  Patient Name: Bobby L Tapley  :1940  Primary Cardiologist: Dr. Donya Gagnon     Chief Complaint   Patient presents with   • Coronary Artery Disease   • Follow-up   :     Dear Dr. Adam,    HPI: Bobby L Tapley is a pleasant 79 y.o. male who presents today for cardiac follow up. He is a new patient to me and his previous records have been reviewed.    In , he was diagnosed with aortic stenosis and underwent mechanical aortic valve replacement, Bentall procedure, he Nirmala Gold graft for transverse thoracic aorta repair, and CABG.  In 2012, he had a repeat catheterization which showed an occluded saphenous vein graft to the LAD.  In 2015, he had an echocardiogram showed normal functioning mechanical aortic valve.      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  Repeat echocardiogram in 2017 revealed an EF of 62% and normal functioning mechanical aortic valve.  He has a history of moderate sleep apnea in which he uses a CPAP machine.  He had another CTA of the chest in 2018 which showed stable postoperative changes involving the aortic root and ascending thoracic aorta and mild dilation of the aortic arch unchanged from 1 year before.  This is followed by Dr. Cox.    In 2018, he followed up with Dr. Gagnon and was doing well at that time.    He presents today with his wife accompanying him.  He reports over the past 2 months he has been experiencing intermittent midsternal chest pressure with exertional activities that resolves with rest within 15-20 minutes.  His only associated symptom is shortness of breath and  fatigue.  He denies PND, orthopnea, cough, edema, palpitations, dizziness, or syncope.  He remains on warfarin and performs home INR monitoring as called to his PCP.  He denies bleeding.  His blood pressure and heart rate are both normal today.    Past Medical History:   Diagnosis Date   • Aneurysm of aortic arch (CMS/HCC)    • Aortic insufficiency    • Aortic stenosis    • Arthritis    • Ascending aortic aneurysm (CMS/HCC)    • CAD (coronary artery disease)    • Enlarged prostate    • H/O aortic valve replacement    • H/O echocardiogram 02/10/2015   • Health care maintenance    • History of EKG 07/07/2015   • Hyperlipidemia    • Hypertension    • Kidney stones    • JOY on CPAP        Past Surgical History:   Procedure Laterality Date   • AORTIC VALVE REPAIR/REPLACEMENT     • CARDIAC CATHETERIZATION  11/27/2013   • COLONOSCOPY N/A 8/15/2017    Procedure: COLONOSCOPY;  Surgeon: Hu Bergman MD;  Location: [facility];  Service:    • CORONARY ARTERY BYPASS GRAFT     • ROTATOR CUFF REPAIR         Social History     Socioeconomic History   • Marital status:      Spouse name: Not on file   • Number of children: Not on file   • Years of education: Not on file   • Highest education level: Not on file   Occupational History   • Occupation: retired   Tobacco Use   • Smoking status: Never Smoker   • Smokeless tobacco: Never Used   Substance and Sexual Activity   • Alcohol use: No     Comment: No caffeine use   • Drug use: No   • Sexual activity: Defer       Family History   Problem Relation Age of Onset   • Cancer Mother    • Aneurysm Father    • Heart disease Father    • Aneurysm Brother    • Heart disease Brother    • Hypertension Cousin    • Obesity Cousin    • Lung cancer Cousin    • Malig Hyperthermia Neg Hx        The following portion of the patient's history were reviewed and updated as appropriate: past medical history, past surgical history, past social history, past family history, allergies,  current medications, and problem list.    Review of Systems   Constitution: Positive for malaise/fatigue. Negative for chills, diaphoresis, fever, night sweats, weight gain and weight loss.   HENT: Positive for hearing loss. Negative for nosebleeds, sore throat and tinnitus.    Eyes: Negative for blurred vision, double vision, pain and visual disturbance.   Cardiovascular: Positive for chest pain and dyspnea on exertion. Negative for claudication, cyanosis, irregular heartbeat, leg swelling, near-syncope, orthopnea, palpitations, paroxysmal nocturnal dyspnea and syncope.   Respiratory: Positive for shortness of breath and snoring. Negative for cough, hemoptysis and wheezing.    Endocrine: Negative for cold intolerance, heat intolerance and polyuria.   Hematologic/Lymphatic: Negative for bleeding problem. Does not bruise/bleed easily.   Skin: Positive for itching. Negative for color change, dry skin and flushing.   Musculoskeletal: Positive for joint pain. Negative for falls, joint swelling, muscle cramps, muscle weakness and myalgias.   Gastrointestinal: Negative for abdominal pain, constipation, heartburn, melena, nausea and vomiting.   Genitourinary: Negative for dysuria and hematuria.   Neurological: Positive for excessive daytime sleepiness and paresthesias. Negative for dizziness, light-headedness, loss of balance, numbness, seizures and vertigo.   Psychiatric/Behavioral: Positive for depression. Negative for altered mental status, memory loss and substance abuse. The patient does not have insomnia and is not nervous/anxious.    Allergic/Immunologic: Negative for environmental allergies.       No Known Allergies      Current Outpatient Medications:   •  aspirin 81 MG tablet, Take 81 mg by mouth Daily., Disp: , Rfl:   •  Calcium Carbonate-Vit D-Min (CALTRATE 600+D PLUS MINERALS) 600-800 MG-UNIT tablet, Take  by mouth., Disp: , Rfl:   •  enalapril (VASOTEC) 2.5 MG tablet, Take 2.5 mg by mouth., Disp: , Rfl:   •   "finasteride (PROSCAR) 5 MG tablet, Take 5 mg by mouth., Disp: , Rfl:   •  HYDROcodone-acetaminophen (NORCO) 5-325 MG per tablet, Take 1 tablet by mouth as needed., Disp: , Rfl:   •  L-Lysine 500 MG capsule, Take 1 capsule by mouth As Needed., Disp: , Rfl:   •  mirtazapine (REMERON) 15 MG tablet, Take 15 mg by mouth Every Night., Disp: , Rfl:   •  Multiple Vitamins-Minerals (CENTRUM SILVER) tablet, Take 1 tablet by mouth Daily., Disp: , Rfl:   •  omeprazole (priLOSEC) 20 MG capsule, Take 20 mg by mouth Daily., Disp: , Rfl:   •  predniSONE (DELTASONE) 5 MG tablet, Take 5 mg by mouth Daily., Disp: , Rfl:   •  vitamin C (ASCORBIC ACID) 500 MG tablet, Take 1,000 mg by mouth Daily., Disp: , Rfl:   •  warfarin (COUMADIN) 5 MG tablet, Take 1.5 tablets (7.5 mg) by mouth on Mon, Wed, and Fri. Take 1 tablet (5 mg) on all other days or as directed by [facility], Disp: 120 tablet, Rfl: 2        Objective:     Vitals:    09/30/19 1239   BP: 120/70   BP Location: Left arm   Pulse: 89   Weight: 71 kg (156 lb 9.6 oz)   Height: 180.3 cm (71\")     Body mass index is 21.84 kg/m².    PHYSICAL EXAM:    Vitals Reviewed.   General Appearance: No acute distress, well developed and well nourished. Obese.   Eyes: Conjunctiva and lids: No erythema, swelling, or discharge. Sclera non-icteric.   HENT: Atraumatic, normocephalic. External eyes, ears, and nose normal. No hearing loss noted. Mucous membranes normal. Lips not cyanotic. Neck supple with no tenderness.  Respiratory: No signs of respiratory distress. Respiration rhythm and depth normal.   Clear to auscultation. No rales, crackles, rhonchi, or wheezing auscultated.   Cardiovascular:  Jugular Venous Pressure: Normal  Heart Rate and Rhythm: Normal, Heart Sounds: Normal S1 and S2. No S3 or S4 noted.  Murmurs: No murmurs noted. No rubs, thrills, or gallops.   Arterial Pulses: Carotid pulses normal. No carotid bruit noted. Posterior tibialis and dorsalis pedis pulses normal.   Lower " Extremities: No edema noted.  Gastrointestinal:  Abdomen soft, non-distended, non-tender. Normal bowel sounds. No hepatomegaly.   Musculoskeletal: Normal movement of extremities  Skin and Nails: General appearance normal. No pallor, cyanosis, diaphoresis. Skin temperature normal. No clubbing of fingernails.   Psychiatric: Patient alert and oriented to person, place, and time. Speech and behavior appropriate. Normal mood and affect.       ECG 12 Lead  Date/Time: 9/30/2019 12:40 PM  Performed by: Ro Guillaume APRN  Authorized by: Ro Guillaume APRN   Comparison: compared with previous ECG from 9/26/2018  Similar to previous ECG  Rhythm: sinus rhythm  BPM: 89  Conduction: left anterior fascicular block  T inversion: aVL    Clinical impression: abnormal EKG  Comments: QTc 491              Assessment:       Diagnosis Plan   1. Precordial pain  Adult Transthoracic Echo Complete W/ Cont if Necessary Per Protocol    Stress Test With Myocardial Perfusion One Day   2. Shortness of breath  Adult Transthoracic Echo Complete W/ Cont if Necessary Per Protocol    Stress Test With Myocardial Perfusion One Day   3. H/O aortic valve replacement     4. Coronary artery disease involving native coronary artery of native heart without angina pectoris     5. Abnormal EKG     6. Aortic arch aneurysm (CMS/HCC)     7. Essential hypertension     8. JOY (obstructive sleep apnea)            Plan:       1/2.  Chest Pain and Shortness of Breath: He reports new onset chest pain and shortness of breath.  I recommended a repeat 2D echocardiogram and cardiac PET scan.    3.  History of Aortic Valve Replacement: Recheck echocardiogram.    4.  Coronary Artery Disease: Status post coronary artery bypass graft surgery.  Patient would like to walk on the treadmill and have ordered a walking Myoview stress test.    Coronary Artery Disease  Assessment  • The patient has CCS class I - angina only during strenuous or prolonged physical  activity    Plan  • Lifestyle modifications discussed include adhering to a heart healthy diet, maintenance of a healthy weight, medication compliance, regular exercise and regular monitoring of cholesterol and blood pressure    Subjective - Objective  • There is a history of previous coronary artery bypass graft  • Current antiplatelet therapy includes aspirin 81 mg      5.  Abnormal EKG: Testing to be completed.    6.  Status post Ascending Aortic Aneurysm Repair: Followed by Dr. Cox.    7.  Hypertension: Blood pressure well controlled.    8.  Obstructive Sleep Apnea: Moderate and compliant with CPAP machine.    9.  Further recommendations to follow.    As always, it has been a pleasure to participate in your patient's care. Thank you.       Sincerely,         ABBY Fleming        Dictated utilizing Dragon dictation

## 2019-10-09 ENCOUNTER — ANTICOAGULATION VISIT (OUTPATIENT)
Dept: PHARMACY | Facility: HOSPITAL | Age: 79
End: 2019-10-09

## 2019-10-09 DIAGNOSIS — Z95.2 MECHANICAL HEART VALVE PRESENT: ICD-10-CM

## 2019-10-09 DIAGNOSIS — Z95.2 H/O AORTIC VALVE REPLACEMENT: ICD-10-CM

## 2019-10-09 LAB — INR PPP: 2.8

## 2019-10-09 NOTE — PROGRESS NOTES
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    INR History:  Anticoagulation Monitoring 2019 2019 10/9/2019   INR 3.00 3.20 2.80   INR Date 2019 2019 10/9/2019   INR Goal 2.5-3.5 2.5-3.5 2.5-3.5   Trend Same Same Same   Last Week Total 35 mg 35 mg 35 mg   Next Week Total 35 mg 35 mg 35 mg   Sun 5 mg 5 mg 5 mg   Mon 5 mg 5 mg 5 mg   Tue 5 mg 5 mg 5 mg   Wed 5 mg 5 mg 5 mg   Thu 5 mg 5 mg 5 mg   Fri 5 mg 5 mg 5 mg   Sat 5 mg 5 mg 5 mg   Visit Report - - -   Some recent data might be hidden       Plan:  1. INR is therapeutic today- see above in Anticoagulation Summary.    Bobby L Tapley to continue their warfarin regimen- see above in Anticoagulation Summary.  2. Follow up in 2 weeks  3. Pt has agreed to only be called if INR out of range. They have been instructed to call if any changes in medications, doses, concerns, etc. Patient expresses understanding and has no further questions at this time.    Kayleen Rodriguez

## 2019-10-11 ENCOUNTER — HOSPITAL ENCOUNTER (OUTPATIENT)
Dept: CARDIOLOGY | Facility: HOSPITAL | Age: 79
Discharge: HOME | End: 2019-10-11
Admitting: NURSE PRACTITIONER

## 2019-10-11 ENCOUNTER — HOSPITAL ENCOUNTER (OUTPATIENT)
Dept: CARDIOLOGY | Facility: HOSPITAL | Age: 79
Discharge: HOME | End: 2019-10-11

## 2019-10-11 VITALS — WEIGHT: 156.53 LBS | HEIGHT: 71 IN | BODY MASS INDEX: 21.91 KG/M2

## 2019-10-11 DIAGNOSIS — R06.02 SHORTNESS OF BREATH: ICD-10-CM

## 2019-10-11 DIAGNOSIS — R07.2 PRECORDIAL PAIN: ICD-10-CM

## 2019-10-11 LAB
AORTIC ARCH: 1.9 CM
AORTIC DIMENSIONLESS INDEX: 0.4 (DI)
AORTIC ROOT ANNULUS: 3.1 CM
ASCENDING AORTA: 4 CM
BH CV ECHO AV AORTIC VALVE AT ACCEL TIME CALCULATED: NORMAL MSEC
BH CV ECHO MEAS - ACS: 1.4 CM
BH CV ECHO MEAS - AO ACC TIME: 0.06 SEC
BH CV ECHO MEAS - AO MAX PG (FULL): 20.7 MMHG
BH CV ECHO MEAS - AO MAX PG: 23.9 MMHG
BH CV ECHO MEAS - AO MEAN PG (FULL): 9.1 MMHG
BH CV ECHO MEAS - AO MEAN PG: 10.9 MMHG
BH CV ECHO MEAS - AO V2 MAX: 244.3 CM/SEC
BH CV ECHO MEAS - AO V2 MEAN: 154.4 CM/SEC
BH CV ECHO MEAS - AO V2 VTI: 40 CM
BH CV ECHO MEAS - AT: 58 SEC
BH CV ECHO MEAS - AVA(I,A): 1.4 CM^2
BH CV ECHO MEAS - AVA(I,D): 1.4 CM^2
BH CV ECHO MEAS - AVA(V,A): 1.2 CM^2
BH CV ECHO MEAS - AVA(V,D): 1.2 CM^2
BH CV ECHO MEAS - BSA(HAYCOCK): 1.9 M^2
BH CV ECHO MEAS - BSA: 1.9 M^2
BH CV ECHO MEAS - BZI_BMI: 22.2 KILOGRAMS/M^2
BH CV ECHO MEAS - BZI_METRIC_HEIGHT: 180.3 CM
BH CV ECHO MEAS - BZI_METRIC_WEIGHT: 72.1 KG
BH CV ECHO MEAS - EDV(MOD-SP2): 67 ML
BH CV ECHO MEAS - EDV(MOD-SP4): 88 ML
BH CV ECHO MEAS - EDV(TEICH): 86.1 ML
BH CV ECHO MEAS - EF(CUBED): 63.7 %
BH CV ECHO MEAS - EF(MOD-BP): 62 %
BH CV ECHO MEAS - EF(MOD-SP2): 61.2 %
BH CV ECHO MEAS - EF(MOD-SP4): 62.5 %
BH CV ECHO MEAS - EF(TEICH): 55.5 %
BH CV ECHO MEAS - ESV(MOD-SP2): 26 ML
BH CV ECHO MEAS - ESV(MOD-SP4): 33 ML
BH CV ECHO MEAS - ESV(TEICH): 38.3 ML
BH CV ECHO MEAS - FS: 28.7 %
BH CV ECHO MEAS - IVS/LVPW: 1.1
BH CV ECHO MEAS - IVSD: 1 CM
BH CV ECHO MEAS - LAT PEAK E' VEL: 9 CM/SEC
BH CV ECHO MEAS - LV DIASTOLIC VOL/BSA (35-75): 46 ML/M^2
BH CV ECHO MEAS - LV MASS(C)D: 139.3 GRAMS
BH CV ECHO MEAS - LV MASS(C)DI: 72.8 GRAMS/M^2
BH CV ECHO MEAS - LV MAX PG: 3.2 MMHG
BH CV ECHO MEAS - LV MEAN PG: 1.7 MMHG
BH CV ECHO MEAS - LV SYSTOLIC VOL/BSA (12-30): 17.3 ML/M^2
BH CV ECHO MEAS - LV V1 MAX: 89.5 CM/SEC
BH CV ECHO MEAS - LV V1 MEAN: 62.7 CM/SEC
BH CV ECHO MEAS - LV V1 VTI: 17.9 CM
BH CV ECHO MEAS - LVIDD: 4.4 CM
BH CV ECHO MEAS - LVIDS: 3.1 CM
BH CV ECHO MEAS - LVLD AP2: 8.2 CM
BH CV ECHO MEAS - LVLD AP4: 8.2 CM
BH CV ECHO MEAS - LVLS AP2: 6.5 CM
BH CV ECHO MEAS - LVLS AP4: 6.8 CM
BH CV ECHO MEAS - LVOT AREA (M): 3.1 CM^2
BH CV ECHO MEAS - LVOT AREA: 3.1 CM^2
BH CV ECHO MEAS - LVOT DIAM: 2 CM
BH CV ECHO MEAS - LVPWD: 0.93 CM
BH CV ECHO MEAS - MED PEAK E' VEL: 9 CM/SEC
BH CV ECHO MEAS - MR MAX PG: 51.8 MMHG
BH CV ECHO MEAS - MR MAX VEL: 359.7 CM/SEC
BH CV ECHO MEAS - MV A DUR: 0.13 SEC
BH CV ECHO MEAS - MV A MAX VEL: 102 CM/SEC
BH CV ECHO MEAS - MV DEC SLOPE: 336.7 CM/SEC^2
BH CV ECHO MEAS - MV DEC TIME: 0.28 SEC
BH CV ECHO MEAS - MV E MAX VEL: 92.5 CM/SEC
BH CV ECHO MEAS - MV E/A: 0.91
BH CV ECHO MEAS - MV MAX PG: 5.1 MMHG
BH CV ECHO MEAS - MV MEAN PG: 2.4 MMHG
BH CV ECHO MEAS - MV P1/2T MAX VEL: 93.8 CM/SEC
BH CV ECHO MEAS - MV P1/2T: 81.6 MSEC
BH CV ECHO MEAS - MV V2 MAX: 112.7 CM/SEC
BH CV ECHO MEAS - MV V2 MEAN: 73.5 CM/SEC
BH CV ECHO MEAS - MV V2 VTI: 39.8 CM
BH CV ECHO MEAS - MVA P1/2T LCG: 2.3 CM^2
BH CV ECHO MEAS - MVA(P1/2T): 2.7 CM^2
BH CV ECHO MEAS - MVA(VTI): 1.4 CM^2
BH CV ECHO MEAS - PA ACC TIME: 0.12 SEC
BH CV ECHO MEAS - PA MAX PG (FULL): 3.4 MMHG
BH CV ECHO MEAS - PA MAX PG: 5.5 MMHG
BH CV ECHO MEAS - PA PR(ACCEL): 26.7 MMHG
BH CV ECHO MEAS - PA V2 MAX: 116.7 CM/SEC
BH CV ECHO MEAS - PULM A REVS DUR: 0.07 SEC
BH CV ECHO MEAS - PULM A REVS VEL: 23.4 CM/SEC
BH CV ECHO MEAS - PULM DIAS VEL: 44.5 CM/SEC
BH CV ECHO MEAS - PULM S/D: 0.8
BH CV ECHO MEAS - PULM SYS VEL: 35.8 CM/SEC
BH CV ECHO MEAS - PVA(V,A): 1.6 CM^2
BH CV ECHO MEAS - PVA(V,D): 1.6 CM^2
BH CV ECHO MEAS - QP/QS: 0.69
BH CV ECHO MEAS - RAP SYSTOLE: 3 MMHG
BH CV ECHO MEAS - RV MAX PG: 2 MMHG
BH CV ECHO MEAS - RV MEAN PG: 1.1 MMHG
BH CV ECHO MEAS - RV V1 MAX: 71.6 CM/SEC
BH CV ECHO MEAS - RV V1 MEAN: 49.5 CM/SEC
BH CV ECHO MEAS - RV V1 VTI: 14.6 CM
BH CV ECHO MEAS - RVOT AREA: 2.7 CM^2
BH CV ECHO MEAS - RVOT DIAM: 1.8 CM
BH CV ECHO MEAS - RVSP: 17.9 MMHG
BH CV ECHO MEAS - SI(CUBED): 27.7 ML/M^2
BH CV ECHO MEAS - SI(LVOT): 29.4 ML/M^2
BH CV ECHO MEAS - SI(MOD-SP2): 21.4 ML/M^2
BH CV ECHO MEAS - SI(MOD-SP4): 28.8 ML/M^2
BH CV ECHO MEAS - SI(TEICH): 25 ML/M^2
BH CV ECHO MEAS - SUP REN AO DIAM: 2 CM
BH CV ECHO MEAS - SV(CUBED): 53.1 ML
BH CV ECHO MEAS - SV(LVOT): 56.3 ML
BH CV ECHO MEAS - SV(MOD-SP2): 41 ML
BH CV ECHO MEAS - SV(MOD-SP4): 55 ML
BH CV ECHO MEAS - SV(RVOT): 38.9 ML
BH CV ECHO MEAS - SV(TEICH): 47.8 ML
BH CV ECHO MEAS - TAPSE (>1.6): 1.2 CM2
BH CV ECHO MEAS - TR MAX VEL: 192.7 CM/SEC
BH CV ECHO MEASUREMENTS AVERAGE E/E' RATIO: 10.28
BH CV NUCLEAR PRIOR STUDY: 3
BH CV STRESS BP STAGE 1: NORMAL
BH CV STRESS BP STAGE 2: NORMAL
BH CV STRESS DURATION MIN STAGE 1: 3
BH CV STRESS DURATION MIN STAGE 2: 2
BH CV STRESS DURATION SEC STAGE 1: 0
BH CV STRESS DURATION SEC STAGE 2: 46
BH CV STRESS GRADE STAGE 1: 10
BH CV STRESS GRADE STAGE 2: 12
BH CV STRESS HR STAGE 1: 124
BH CV STRESS HR STAGE 2: 152
BH CV STRESS METS STAGE 1: 5
BH CV STRESS METS STAGE 2: 7.5
BH CV STRESS PROTOCOL 1: NORMAL
BH CV STRESS RECOVERY BP: NORMAL MMHG
BH CV STRESS RECOVERY HR: 95 BPM
BH CV STRESS SPEED STAGE 1: 1.7
BH CV STRESS SPEED STAGE 2: 2.5
BH CV STRESS STAGE 1: 1
BH CV STRESS STAGE 2: 2
BH CV XLRA - RV BASE: 3 CM
BH CV XLRA - TDI S': 11 CM/SEC
LEFT ATRIUM VOLUME INDEX: 21 ML/M2
LV EF 2D ECHO EST: 62 %
LV EF NUC BP: 59 %
MAXIMAL PREDICTED HEART RATE: 141 BPM
PERCENT MAX PREDICTED HR: 107.8 %
SINUS: 3.7 CM
STJ: 4.2 CM
STRESS BASELINE BP: NORMAL MMHG
STRESS BASELINE HR: 89 BPM
STRESS PERCENT HR: 127 %
STRESS POST ESTIMATED WORKLOAD: 7.5 METS
STRESS POST EXERCISE DUR MIN: 5 MIN
STRESS POST EXERCISE DUR SEC: 46 SEC
STRESS POST PEAK BP: NORMAL MMHG
STRESS POST PEAK HR: 152 BPM
STRESS TARGET HR: 120 BPM

## 2019-10-11 PROCEDURE — 0 TECHNETIUM TETROFOSMIN KIT: Performed by: NURSE PRACTITIONER

## 2019-10-11 PROCEDURE — 93018 CV STRESS TEST I&R ONLY: CPT | Performed by: INTERNAL MEDICINE

## 2019-10-11 PROCEDURE — 78452 HT MUSCLE IMAGE SPECT MULT: CPT | Performed by: INTERNAL MEDICINE

## 2019-10-11 PROCEDURE — 78452 HT MUSCLE IMAGE SPECT MULT: CPT

## 2019-10-11 PROCEDURE — 93016 CV STRESS TEST SUPVJ ONLY: CPT | Performed by: INTERNAL MEDICINE

## 2019-10-11 PROCEDURE — 93306 TTE W/DOPPLER COMPLETE: CPT | Performed by: INTERNAL MEDICINE

## 2019-10-11 PROCEDURE — 93306 TTE W/DOPPLER COMPLETE: CPT

## 2019-10-11 PROCEDURE — 93017 CV STRESS TEST TRACING ONLY: CPT

## 2019-10-11 PROCEDURE — 25010000002 PERFLUTREN (DEFINITY) 8.476 MG IN SODIUM CHLORIDE 0.9 % 10 ML INJECTION: Performed by: NURSE PRACTITIONER

## 2019-10-11 PROCEDURE — A9502 TC99M TETROFOSMIN: HCPCS | Performed by: NURSE PRACTITIONER

## 2019-10-11 RX ADMIN — TETROFOSMIN 1 DOSE: 1.38 INJECTION, POWDER, LYOPHILIZED, FOR SOLUTION INTRAVENOUS at 09:15

## 2019-10-11 RX ADMIN — TETROFOSMIN 1 DOSE: 1.38 INJECTION, POWDER, LYOPHILIZED, FOR SOLUTION INTRAVENOUS at 08:10

## 2019-10-11 RX ADMIN — PERFLUTREN 1.5 ML: 6.52 INJECTION, SUSPENSION INTRAVENOUS at 08:09

## 2019-10-16 DIAGNOSIS — Z86.79 S/P ASCENDING AORTIC ANEURYSM REPAIR: Primary | ICD-10-CM

## 2019-10-16 DIAGNOSIS — Z98.890 S/P ASCENDING AORTIC ANEURYSM REPAIR: Primary | ICD-10-CM

## 2019-10-16 DIAGNOSIS — I71.22 AORTIC ARCH ANEURYSM (HCC): ICD-10-CM

## 2019-10-17 ENCOUNTER — TELEPHONE (OUTPATIENT)
Dept: CARDIOLOGY | Facility: CLINIC | Age: 79
End: 2019-10-17

## 2019-10-17 DIAGNOSIS — I25.10 CORONARY ARTERY DISEASE INVOLVING NATIVE CORONARY ARTERY OF NATIVE HEART WITHOUT ANGINA PECTORIS: Primary | ICD-10-CM

## 2019-10-17 DIAGNOSIS — I20.9 ANGINA, CLASS III (HCC): ICD-10-CM

## 2019-10-17 NOTE — TELEPHONE ENCOUNTER
Echo Results:    Result Text     · Calculated right ventricular systolic pressure from tricuspid regurgitation is 17.9 mmHg.  · Estimated EF = 62%.  · Left ventricular systolic function is normal.  · There is a 23 mm carbomedics mechanical valve present. The aortic valve peak and mean gradients are within defined limits.  · Mild mitral valve regurgitation is present  · Ascending aorta graft is present.. Mild dilation of the ascending aorta is present.        Dr. Gagnon said he does not need a CTA at this time.  His wife is on the phone and communicating with him as I hear him in the background.  They say that they have a follow-up with Dr. Cox in the near future.    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  · Paradoxical septal motion is noted.  · Impressions are consistent with a low risk study.    Dr. Gagnon has recommended left heart cath. I have explained the procedure to the patient and he verbalizes understanding. The risks of the procedure were explained to the patient including bleeding, hypotension, allergy/reaction to contrast dye, vascular site problems, kidney problems and 1 in a 1000 risk of heart attack, death, or stroke with procedure. Patient would like to proceed with procedure.     I spoke with wife ( in background) and they would like to proceed with heart cath.

## 2019-10-18 ENCOUNTER — TELEPHONE (OUTPATIENT)
Dept: CARDIOLOGY | Facility: CLINIC | Age: 79
End: 2019-10-18

## 2019-10-18 PROBLEM — I20.9 ANGINA, CLASS III (HCC): Status: ACTIVE | Noted: 2019-10-18

## 2019-10-18 PROBLEM — I25.10 CORONARY ARTERY DISEASE INVOLVING NATIVE CORONARY ARTERY OF NATIVE HEART WITHOUT ANGINA PECTORIS: Status: ACTIVE | Noted: 2019-10-18

## 2019-10-18 NOTE — TELEPHONE ENCOUNTER
Dr Dunn    Mr Tapley will see you on 10/28 for a C and is on Warfarin.  What instructions should I give him for this med?    Thank you  Cassy PLUNKETT

## 2019-10-22 ENCOUNTER — TRANSCRIBE ORDERS (OUTPATIENT)
Dept: CARDIOLOGY | Facility: CLINIC | Age: 79
End: 2019-10-22

## 2019-10-22 DIAGNOSIS — Z01.810 PREOP CARDIOVASCULAR EXAM: Primary | ICD-10-CM

## 2019-10-22 DIAGNOSIS — I25.10 CORONARY ARTERY DISEASE INVOLVING NATIVE CORONARY ARTERY OF NATIVE HEART WITHOUT ANGINA PECTORIS: ICD-10-CM

## 2019-10-22 DIAGNOSIS — Z13.6 SCREENING FOR CARDIOVASCULAR CONDITION: ICD-10-CM

## 2019-10-22 DIAGNOSIS — Z79.01 LONG TERM (CURRENT) USE OF ANTICOAGULANTS: ICD-10-CM

## 2019-10-24 ENCOUNTER — LAB (OUTPATIENT)
Dept: LAB | Facility: HOSPITAL | Age: 79
End: 2019-10-24

## 2019-10-24 ENCOUNTER — ANTICOAGULATION VISIT (OUTPATIENT)
Dept: PHARMACY | Facility: HOSPITAL | Age: 79
End: 2019-10-24

## 2019-10-24 DIAGNOSIS — Z95.2 MECHANICAL HEART VALVE PRESENT: ICD-10-CM

## 2019-10-24 DIAGNOSIS — Z95.2 H/O AORTIC VALVE REPLACEMENT: ICD-10-CM

## 2019-10-24 DIAGNOSIS — Z13.6 SCREENING FOR CARDIOVASCULAR CONDITION: ICD-10-CM

## 2019-10-24 DIAGNOSIS — Z79.01 LONG TERM (CURRENT) USE OF ANTICOAGULANTS: ICD-10-CM

## 2019-10-24 DIAGNOSIS — Z01.810 PREOP CARDIOVASCULAR EXAM: ICD-10-CM

## 2019-10-24 DIAGNOSIS — I25.10 CORONARY ARTERY DISEASE INVOLVING NATIVE CORONARY ARTERY OF NATIVE HEART WITHOUT ANGINA PECTORIS: ICD-10-CM

## 2019-10-24 LAB
ANION GAP SERPL CALCULATED.3IONS-SCNC: 13.7 MMOL/L (ref 5–15)
BASOPHILS # BLD AUTO: 0.07 10*3/MM3 (ref 0–0.2)
BASOPHILS NFR BLD AUTO: 0.7 % (ref 0–1.5)
BUN BLD-MCNC: 21 MG/DL (ref 8–23)
BUN/CREAT SERPL: 27.3 (ref 7–25)
CALCIUM SPEC-SCNC: 9.1 MG/DL (ref 8.6–10.5)
CHLORIDE SERPL-SCNC: 102 MMOL/L (ref 98–107)
CO2 SERPL-SCNC: 24.3 MMOL/L (ref 22–29)
CREAT BLD-MCNC: 0.77 MG/DL (ref 0.76–1.27)
DEPRECATED RDW RBC AUTO: 43.4 FL (ref 37–54)
EOSINOPHIL # BLD AUTO: 0.08 10*3/MM3 (ref 0–0.4)
EOSINOPHIL NFR BLD AUTO: 0.8 % (ref 0.3–6.2)
ERYTHROCYTE [DISTWIDTH] IN BLOOD BY AUTOMATED COUNT: 14.1 % (ref 12.3–15.4)
GFR SERPL CREATININE-BSD FRML MDRD: 97 ML/MIN/1.73
GLUCOSE BLD-MCNC: 144 MG/DL (ref 65–99)
HCT VFR BLD AUTO: 43.3 % (ref 37.5–51)
HGB BLD-MCNC: 14 G/DL (ref 13–17.7)
IMM GRANULOCYTES # BLD AUTO: 0.04 10*3/MM3 (ref 0–0.05)
IMM GRANULOCYTES NFR BLD AUTO: 0.4 % (ref 0–0.5)
INR PPP: 2.85 (ref 0.9–1.1)
LYMPHOCYTES # BLD AUTO: 1.21 10*3/MM3 (ref 0.7–3.1)
LYMPHOCYTES NFR BLD AUTO: 11.8 % (ref 19.6–45.3)
MCH RBC QN AUTO: 27.3 PG (ref 26.6–33)
MCHC RBC AUTO-ENTMCNC: 32.3 G/DL (ref 31.5–35.7)
MCV RBC AUTO: 84.4 FL (ref 79–97)
MONOCYTES # BLD AUTO: 0.6 10*3/MM3 (ref 0.1–0.9)
MONOCYTES NFR BLD AUTO: 5.9 % (ref 5–12)
NEUTROPHILS # BLD AUTO: 8.25 10*3/MM3 (ref 1.7–7)
NEUTROPHILS NFR BLD AUTO: 80.4 % (ref 42.7–76)
NRBC BLD AUTO-RTO: 0 /100 WBC (ref 0–0.2)
PLATELET # BLD AUTO: 263 10*3/MM3 (ref 140–450)
PMV BLD AUTO: 9 FL (ref 6–12)
POTASSIUM BLD-SCNC: 4.3 MMOL/L (ref 3.5–5.2)
PROTHROMBIN TIME: 29.6 SECONDS (ref 11.7–14.2)
RBC # BLD AUTO: 5.13 10*6/MM3 (ref 4.14–5.8)
SODIUM BLD-SCNC: 140 MMOL/L (ref 136–145)
WBC NRBC COR # BLD: 10.25 10*3/MM3 (ref 3.4–10.8)

## 2019-10-24 PROCEDURE — 80048 BASIC METABOLIC PNL TOTAL CA: CPT

## 2019-10-24 PROCEDURE — 36415 COLL VENOUS BLD VENIPUNCTURE: CPT

## 2019-10-24 PROCEDURE — 85610 PROTHROMBIN TIME: CPT

## 2019-10-24 PROCEDURE — 85025 COMPLETE CBC W/AUTO DIFF WBC: CPT

## 2019-10-24 NOTE — PROGRESS NOTES
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.2]             Anticoagulation Episode Summary     INR check location:   Home Draw    Preferred lab:       Send INR reminders to:    KYE HAMPTON  POOL    Comments:   **[facility]**      Anticoagulation Care Providers     Provider Role Specialty Phone number    Donya Gagnon MD Referring Cardiology [phone]          Clinic Interview:      INR History:  Anticoagulation Monitoring 2019 10/9/2019 10/24/2019   INR 3.20 2.80 2.85   INR Date 2019 10/9/2019 10/24/2019   INR Goal 2.5-3.5 2.5-3.5 2.5-3.5   Trend Same Same Same   Last Week Total 35 mg 35 mg 35 mg   Next Week Total 35 mg 35 mg 25 mg   Sun 5 mg 5 mg Hold (10/27); Otherwise 5 mg   Mon 5 mg 5 mg 5 mg   Tue 5 mg 5 mg 5 mg   Wed 5 mg 5 mg 5 mg   Thu 5 mg 5 mg 5 mg   Fri 5 mg 5 mg 5 mg   Sat 5 mg 5 mg Hold (10/26); Otherwise 5 mg   Visit Report - - -   Some recent data might be hidden       Plan:  1. INR is Therapeutic today- see above in Anticoagulation Summary.   Will instruct Bobby L Tapley to Continue their warfarin regimen and will hold for cardiac procedure 10/28 as directed- see above in Anticoagulation Summary.  2. Follow up in 2 weeks  3. They have been instructed to call if any changes in medications, doses, concerns, etc. Patient expresses understanding and has no further questions at this time.    Carine Lisa [facility]

## 2019-10-28 ENCOUNTER — HOSPITAL ENCOUNTER (OUTPATIENT)
Facility: HOSPITAL | Age: 79
Setting detail: HOSPITAL OUTPATIENT SURGERY
Discharge: HOME | End: 2019-10-28
Attending: INTERNAL MEDICINE | Admitting: INTERNAL MEDICINE

## 2019-10-28 VITALS
HEIGHT: 71 IN | WEIGHT: 156 LBS | OXYGEN SATURATION: 95 % | SYSTOLIC BLOOD PRESSURE: 135 MMHG | DIASTOLIC BLOOD PRESSURE: 89 MMHG | HEART RATE: 84 BPM | TEMPERATURE: 98.9 F | BODY MASS INDEX: 21.84 KG/M2 | RESPIRATION RATE: 16 BRPM

## 2019-10-28 DIAGNOSIS — I20.9 ANGINA, CLASS III (HCC): ICD-10-CM

## 2019-10-28 DIAGNOSIS — I25.10 CORONARY ARTERY DISEASE INVOLVING NATIVE CORONARY ARTERY OF NATIVE HEART WITHOUT ANGINA PECTORIS: ICD-10-CM

## 2019-10-28 LAB
INR PPP: 2.4 (ref 0.8–1.2)
INR PPP: 2.4 (ref 0.9–1.1)
PROTHROMBIN TIME: 27.7 SECONDS
PROTHROMBIN TIME: 27.7 SECONDS (ref 12.8–15.2)

## 2019-10-28 PROCEDURE — 85610 PROTHROMBIN TIME: CPT

## 2019-10-28 PROCEDURE — S0260 H&P FOR SURGERY: HCPCS | Performed by: INTERNAL MEDICINE

## 2019-10-28 RX ORDER — LIDOCAINE HYDROCHLORIDE 10 MG/ML
0.1 INJECTION, SOLUTION EPIDURAL; INFILTRATION; INTRACAUDAL; PERINEURAL ONCE AS NEEDED
Status: DISCONTINUED | OUTPATIENT
Start: 2019-10-28 | End: 2019-10-28 | Stop reason: HOSPADM

## 2019-10-28 RX ORDER — COLCHICINE 0.6 MG/1
0.6 TABLET ORAL DAILY
COMMUNITY
End: 2020-02-07 | Stop reason: ALTCHOICE

## 2019-10-28 RX ORDER — SODIUM CHLORIDE 0.9 % (FLUSH) 0.9 %
3 SYRINGE (ML) INJECTION EVERY 12 HOURS SCHEDULED
Status: DISCONTINUED | OUTPATIENT
Start: 2019-10-28 | End: 2019-10-28 | Stop reason: HOSPADM

## 2019-10-28 RX ORDER — SODIUM CHLORIDE 0.9 % (FLUSH) 0.9 %
10 SYRINGE (ML) INJECTION AS NEEDED
Status: DISCONTINUED | OUTPATIENT
Start: 2019-10-28 | End: 2019-10-28 | Stop reason: HOSPADM

## 2019-10-28 RX ORDER — DOCUSATE SODIUM 250 MG
250 CAPSULE ORAL DAILY
COMMUNITY
End: 2021-04-20

## 2019-10-28 RX ORDER — SODIUM CHLORIDE 9 MG/ML
75 INJECTION, SOLUTION INTRAVENOUS CONTINUOUS
Status: DISCONTINUED | OUTPATIENT
Start: 2019-10-28 | End: 2019-10-28 | Stop reason: HOSPADM

## 2019-10-28 RX ADMIN — SODIUM CHLORIDE 75 ML/HR: 9 INJECTION, SOLUTION INTRAVENOUS at 07:31

## 2019-10-28 NOTE — NURSING NOTE
DR SHIELDS SPOKE WITH PATIENT.  DR SHIELDS DECIDED NO NEED FOR CATH AT THIS TIME.  PROCEDURE CANCELLED.   GAVE PATIENT AND FAMILY INSTRUCTIONS.  VERBALIZED UNDERSTANDING

## 2019-10-28 NOTE — H&P
He is a 79-year-old gentleman he has a mechanical aortic valve in 2004.  He recently followed up in the office and told them he was having some chest discomfort its kind of left-sided not really related to activity.  He tells me it lasts for a few seconds and he may get it once or once every other month.  Sometimes he gets short of breath with it sometimes he does not.  Is not clearly precipitated by activity.  He does not have PND orthopnea edema.  He also says he has some gas pain.   In fairness his wife says that is not really what you told the doctor in the office.  He says that is what he is had.  This did lead to a stress test which showed a small area of apical infarction and no ischemia and is interpreted as a low risk stress test.     At this point we are going to manage him medically NR today is 2.4 symptoms seem a little atypical and he is a low risk stress test and is not even on any real antianginal therapy.  Discussed with his primary cardiologist and she agrees we will discharge him today.  Resume his warfarin and have him come see Dr. Gagnon in the office in a couple weeks

## 2019-11-05 LAB — INR PPP: 2.3

## 2019-11-06 ENCOUNTER — ANTICOAGULATION VISIT (OUTPATIENT)
Dept: PHARMACY | Facility: HOSPITAL | Age: 79
End: 2019-11-06

## 2019-11-06 DIAGNOSIS — Z95.2 MECHANICAL HEART VALVE PRESENT: ICD-10-CM

## 2019-11-06 DIAGNOSIS — Z95.2 H/O AORTIC VALVE REPLACEMENT: ICD-10-CM

## 2019-11-06 NOTE — PROGRESS NOTES
Anticoagulation Clinic Progress Note    Anticoagulation Summary  As of 2019    INR goal:   2.5-3.5   TTR:   85.4 % (10.8 mo)   INR used for dosin.30! (2019)   Warfarin maintenance plan:   5 mg every day   Weekly warfarin total:   35 mg   Plan last modified:   Zonia Tejeda [facility] (2019)   Next INR check:   2019   Priority:   Maintenance   Target end date:   Indefinite    Indications    H/O aortic valve replacement [Z95.2]  Mechanical heart valve present [Z95.2]             Anticoagulation Episode Summary     INR check location:   Home Draw    Preferred lab:       Send INR reminders to:    [facility]  POOL    Comments:   **[facility]**      Anticoagulation Care Providers     Provider Role Specialty Phone number    Donya Gagnon MD Referring Cardiology [phone]          Drug interactions: has remained unchanged.  Diet: has remained unchanged.    Clinic Interview:  Patient Findings     Positives:   Missed doses    Negatives:   Signs/symptoms of thrombosis, Signs/symptoms of bleeding,   Laboratory test error suspected, Change in health, Change in alcohol use,   Change in activity, Upcoming invasive procedure, Emergency department   visit, Upcoming dental procedure, Extra doses, Change in medications,   Change in diet/appetite, Hospital admission, Bruising, Other complaints    Comments:   Held two doses (10/27 and 10/28) prior to heart cath   procedure. Procedure was discontinued and patient restarted warfarin on   10/28/19      Clinical Outcomes     Negatives:   Major bleeding event, Thromboembolic event,   Anticoagulation-related hospital admission, Anticoagulation-related ED   visit, Anticoagulation-related fatality    Comments:   Held two doses (10/27 and 10/28) prior to heart cath   procedure. Procedure was discontinued and patient restarted warfarin on   10/28/19        INR History:  Anticoagulation Monitoring 10/9/2019 10/24/2019 2019   INR 2.80  2.85 2.30   INR Date 10/9/2019 10/24/2019 11/5/2019   INR Goal 2.5-3.5 2.5-3.5 2.5-3.5   Trend Same Same Same   Last Week Total 35 mg 35 mg 35 mg   Next Week Total 35 mg 25 mg 37.5 mg   Sun 5 mg Hold (10/27); Otherwise 5 mg 5 mg   Mon 5 mg 5 mg 5 mg   Tue 5 mg 5 mg -   Wed 5 mg 5 mg 7.5 mg (11/6)   Thu 5 mg 5 mg 5 mg   Fri 5 mg 5 mg 5 mg   Sat 5 mg Hold (10/26); Otherwise 5 mg 5 mg   Visit Report - - -   Some recent data might be hidden       Plan:  1. INR is Subtherapeutic today- see above in Anticoagulation Summary.   Will instruct Bobby L Tapley to Change their warfarin regimen- see above in Anticoagulation Summary. Spoke with spouse and instructed her to boost Mr. Tapley's warfarin dose today with 7.5 mg then continue same regimen.   2. Follow up in 1 week  3. They have been instructed to call if any changes in medications, doses, concerns, etc. Patient expresses understanding and has no further questions at this time.    Harriet Shepherd, [facility]

## 2019-11-07 ENCOUNTER — OFFICE VISIT (OUTPATIENT)
Dept: CARDIOLOGY | Facility: CLINIC | Age: 79
End: 2019-11-07

## 2019-11-07 VITALS
BODY MASS INDEX: 22.73 KG/M2 | SYSTOLIC BLOOD PRESSURE: 150 MMHG | HEIGHT: 70 IN | DIASTOLIC BLOOD PRESSURE: 80 MMHG | HEART RATE: 94 BPM | WEIGHT: 158.8 LBS

## 2019-11-07 DIAGNOSIS — G47.33 OSA ON CPAP: ICD-10-CM

## 2019-11-07 DIAGNOSIS — I10 ESSENTIAL HYPERTENSION: ICD-10-CM

## 2019-11-07 DIAGNOSIS — Z98.890 HX OF ASCENDING AORTA REPAIR: ICD-10-CM

## 2019-11-07 DIAGNOSIS — E78.5 HYPERLIPIDEMIA, UNSPECIFIED HYPERLIPIDEMIA TYPE: ICD-10-CM

## 2019-11-07 DIAGNOSIS — I25.10 CORONARY ARTERY DISEASE INVOLVING NATIVE CORONARY ARTERY OF NATIVE HEART WITHOUT ANGINA PECTORIS: Primary | ICD-10-CM

## 2019-11-07 DIAGNOSIS — Z95.1 HX OF CABG: ICD-10-CM

## 2019-11-07 DIAGNOSIS — I71.22 AORTIC ARCH ANEURYSM (HCC): ICD-10-CM

## 2019-11-07 DIAGNOSIS — Z95.2 MECHANICAL HEART VALVE PRESENT: ICD-10-CM

## 2019-11-07 DIAGNOSIS — Z99.89 OSA ON CPAP: ICD-10-CM

## 2019-11-07 PROCEDURE — 93000 ELECTROCARDIOGRAM COMPLETE: CPT | Performed by: INTERNAL MEDICINE

## 2019-11-07 PROCEDURE — 99214 OFFICE O/P EST MOD 30 MIN: CPT | Performed by: INTERNAL MEDICINE

## 2019-11-07 RX ORDER — METOPROLOL SUCCINATE 50 MG/1
50 TABLET, EXTENDED RELEASE ORAL NIGHTLY
Qty: 90 TABLET | Refills: 3 | Status: SHIPPED | OUTPATIENT
Start: 2019-11-07 | End: 2021-04-20

## 2019-11-07 NOTE — PROGRESS NOTES
Date of Office Visit: 2019  Encounter Provider: Donya Gagnon MD  Place of Service: [facility] CARDIOLOGY  Patient Name: Bobby L Tapley  :1940      Patient ID:  Bobby L Tapley is a 79 y.o. male is here for  followup for s/p CABG, mechanical aortic valve.         History of Present Illness     In  he underwent replacement of his aortic valve, getting a mechanical aortic valve.   He also received saphenous vein graft to LAD, replacement of the ascending and transverse  aortic arch with reimplantation of the coronary arteries onto the arch.  His operative report from [facility] dated 2004.  they found intraoperatively was 50-60% mid LAD stenosis, moderately severe aortic valve  stenosis, calcified bileaflet aortic valve and a 6 cm ascending aortic aneurysm as well as  dilation of the transverse thoracic aorta. He underwent a Bentall procedure and received  a #23 Carbomedics mechanical aortic valve with conduit. He had 30 mm woven Hemashield gold graft  used to replace the transverse thoracic aorta. The internal mammary artery was not used  because it was small on inspection so he received a saphenous vein graft to the LAD. This  procedure was done by Dr. Santana Walls.          He had an echocardiogram which was done in 2013 which revealed a normally functioning  mechanical aortic valve. His ejection fraction was 60% with normal LV size and no LVH.   He had a cardiac catheterization which was done in 2012 which revealed a patent LAD,  patent left main, patent circumflex vessel, patent RCA and occluded saphenous vein graft  to LAD. stress nuclear perfusion study which was performed in 2012 prior  to the catheterization which showed a small amount of inferior wall ischemia but no ST  changes on that.       He had a 2-D echocardiogram with Doppler done in 02/10/2015, showing an ejection fraction  of 65%, septal hypokinesis, mechanical type prosthetic  valve, which was normal and trace  tricuspid insufficiency.     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  There is evidence of chronic emphysema, hepatic steatosis and a prosthetic aortic valve.  He had an echo done October 2017 showing ejection fraction 62%, mechanical prosthetic aortic valve which was normal, trivial insufficiency, trace mitral tricuspid insufficiency, aortic root dilation.     He has obstructive sleep apnea and is using CPAP.  He was set up for a cardiac catheterization after an abnormal stress study done 10/11/19 showing a small apical infarct.  When he arrived on the day of cardiac catheterization, his INR was high.  In addition his symptoms were somewhat atypical and so the case was canceled.  He had an echocardiogram done 10/11/2019 showing ejection fraction 62% with a 23 mm CarboMedics mechanical valve that was within defined limits, mild mitral insufficiency and mild dilation of the ascending aorta.    The chest pain he has intermittent, nonexertional and only happens every 5 days or so.  He has chronic exertional short windedness which is unchanged.  Does not feel his heart racing or skipping.  Had no dizziness or syncope.  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  His blood pressure is high today.  He has no orthopnea or PND.  He had no fevers or chills.    Past Medical History:   Diagnosis Date   • Aneurysm of aortic arch (CMS/HCC)    • Aortic insufficiency    • Aortic stenosis    • Arthritis    • Ascending aortic aneurysm (CMS/HCC)    • CAD (coronary artery disease)    • Enlarged prostate    • H/O aortic valve replacement    • H/O echocardiogram 02/10/2015   • Health care maintenance    • History of EKG 07/07/2015   • Hyperlipidemia    • Hypertension    • Kidney stones    • JOY on CPAP    • Precordial  pain    • Shortness of breath          Past Surgical History:   Procedure Laterality Date   • AORTIC VALVE REPAIR/REPLACEMENT     • CARDIAC CATHETERIZATION  11/27/2013   • COLONOSCOPY N/A 8/15/2017    Procedure: COLONOSCOPY;  Surgeon: Hu Bergman MD;  Location: [facility] ENDOSCOPY;  Service:    • CORONARY ARTERY BYPASS GRAFT     • ROTATOR CUFF REPAIR         Current Outpatient Medications on File Prior to Visit   Medication Sig Dispense Refill   • aspirin 81 MG tablet Take 81 mg by mouth Daily.     • Calcium Carbonate-Vit D-Min (CALTRATE 600+D PLUS MINERALS) 600-800 MG-UNIT tablet Take 1 tablet by mouth Daily.     • colchicine 0.6 MG tablet Take 0.6 mg by mouth Daily.     • docusate sodium (COLACE) 250 MG capsule Take 250 mg by mouth Daily.     • enalapril (VASOTEC) 2.5 MG tablet Take 2.5 mg by mouth Daily.     • finasteride (PROSCAR) 5 MG tablet Take 5 mg by mouth Daily.     • HYDROcodone-acetaminophen (NORCO) 5-325 MG per tablet Take 1 tablet by mouth as needed.     • L-Lysine 500 MG capsule Take 1 capsule by mouth As Needed.     • mirtazapine (REMERON) 15 MG tablet Take 15 mg by mouth Every Night.     • Multiple Vitamins-Minerals (CENTRUM SILVER) tablet Take 1 tablet by mouth Daily.     • omeprazole (priLOSEC) 20 MG capsule Take 20 mg by mouth Daily.     • predniSONE (DELTASONE) 5 MG tablet Take 5 mg by mouth Daily.     • vitamin C (ASCORBIC ACID) 500 MG tablet Take 1,000 mg by mouth Daily.     • warfarin (COUMADIN) 5 MG tablet Take 1.5 tablets (7.5 mg) by mouth on Mon, Wed, and Fri. Take 1 tablet (5 mg) on all other days or as directed by [facility] (Patient taking differently: Take 5 mg by mouth Every Night. Take 1.5 tablets (7.5 mg) by mouth on Mon, Wed, and Fri. Take 1 tablet (5 mg) on all other days or as directed by [facility]) 120 tablet 2     No current facility-administered medications on file prior to visit.        Social History     Socioeconomic History   • Marital status:       "Spouse name: Not on file   • Number of children: Not on file   • Years of education: Not on file   • Highest education level: Not on file   Occupational History   • Occupation: retired   Tobacco Use   • Smoking status: Never Smoker   • Smokeless tobacco: Never Used   Substance and Sexual Activity   • Alcohol use: No     Comment: No caffeine use   • Drug use: No   • Sexual activity: Defer           Review of Systems   Constitution: Negative.   HENT: Positive for hearing loss. Negative for congestion.    Eyes: Negative for vision loss in left eye and vision loss in right eye.   Cardiovascular: Positive for leg swelling.   Respiratory: Positive for shortness of breath and snoring. Negative for cough, hemoptysis, sleep disturbances due to breathing, sputum production and wheezing.    Endocrine: Negative.    Hematologic/Lymphatic: Negative.    Skin: Negative for poor wound healing and rash.   Musculoskeletal: Negative for falls, gout, muscle cramps and myalgias.   Gastrointestinal: Negative for abdominal pain, diarrhea, dysphagia, hematemesis, melena, nausea and vomiting.   Neurological: Negative for excessive daytime sleepiness, dizziness, headaches, light-headedness, loss of balance, seizures and vertigo.   Psychiatric/Behavioral: Negative for depression and substance abuse. The patient is not nervous/anxious.        Procedures    ECG 12 Lead  Date/Time: 11/7/2019 11:02 AM  Performed by: Donya Gagnon MD  Authorized by: Donya Gagnon MD   Comparison: compared with previous ECG   Similar to previous ECG  Rhythm: sinus rhythm  Conduction: left anterior fascicular block  Other findings: poor R wave progression    Clinical impression: abnormal EKG                Objective:      Vitals:    11/07/19 1046   BP: 150/80   BP Location: Left arm   Patient Position: Sitting   Pulse: 94   Weight: 72 kg (158 lb 12.8 oz)   Height: 177.8 cm (70\")     Body mass index is 22.79 kg/m².    Physical Exam   Constitutional: He " is oriented to person, place, and time. He appears well-developed and well-nourished. No distress.   HENT:   Head: Normocephalic and atraumatic.   Eyes: Conjunctivae are normal. No scleral icterus.   Neck: Neck supple. No JVD present. Carotid bruit is not present. No thyromegaly present.   Cardiovascular: Normal rate, regular rhythm, S1 normal, S2 normal and intact distal pulses.  No extrasystoles are present. PMI is not displaced. Exam reveals no gallop.   No murmur heard.  Pulses:       Carotid pulses are 2+ on the right side, and 2+ on the left side.       Radial pulses are 2+ on the right side, and 2+ on the left side.        Dorsalis pedis pulses are 2+ on the right side, and 2+ on the left side.        Posterior tibial pulses are 2+ on the right side, and 2+ on the left side.   Pulmonary/Chest: Effort normal and breath sounds normal. No respiratory distress. He has no wheezes. He has no rhonchi. He has no rales. He exhibits no tenderness.   Abdominal: Soft. Bowel sounds are normal. He exhibits no distension, no abdominal bruit and no mass. There is no tenderness.   Musculoskeletal: He exhibits no edema or deformity.   Lymphadenopathy:     He has no cervical adenopathy.   Neurological: He is alert and oriented to person, place, and time. No cranial nerve deficit.   Skin: Skin is warm and dry. No rash noted. He is not diaphoretic. No cyanosis. No pallor. Nails show no clubbing.   Psychiatric: He has a normal mood and affect. Judgment normal.   Vitals reviewed.      Lab Review:       Assessment:      Diagnosis Plan   1. Coronary artery disease involving native coronary artery of native heart without angina pectoris     2. Mechanical heart valve present     3. Aortic arch aneurysm (CMS/HCC)     4. Hyperlipidemia, unspecified hyperlipidemia type     5. Essential hypertension     6. JOY on CPAP     7. hx of mechanical AV conduit ([id number] 23 mm) by Dr. Santana Bailey at [facility] 2/25/2004     8. Hx of CABG        1. Status post mechanical aortic valve replacement for a bicuspid aortic valve with severe  stenosis and large ascending aortic aneurysm. This was done in 02/2004. He received a 23  mm Carbomedics mechanical aortic valve with a conduit. His thoracic aorta was repaired  with 30 mm woven Hemashield gold graft and his ascending aortic aneurysm was also  repaired. He received a saphenous vein graft to LAD at that time. He then had a cardiac  catheterization in 2012 which revealed the saphenous vein graft to the LAD was occluded  but his coronary arteries were all patent. At this time he has no active angina. His echocardiogram from 10/2017, shows normal function of his  mechanical aortic valve.   2. Normal ejection fraction   3. Hypertension under fair control.   4. Hyperlipidemia. The patient is on no statin therapy for this. His lipids are well controlled.   5. Renal calculi; stable.   6. JOY, on CPAP.      Plan:       Start metoprolol succinate 50 mg nightly.  See Ro in 3 months.  Reschedule CT the chest, order is in.  Does not need cardiac catheterization at this time.  Really does not have anginal type chest pain.

## 2019-11-11 ENCOUNTER — HOSPITAL ENCOUNTER (OUTPATIENT)
Dept: CT IMAGING | Facility: HOSPITAL | Age: 79
Discharge: HOME | End: 2019-11-11
Admitting: NURSE PRACTITIONER

## 2019-11-11 DIAGNOSIS — Z86.79 S/P ASCENDING AORTIC ANEURYSM REPAIR: ICD-10-CM

## 2019-11-11 DIAGNOSIS — Z98.890 S/P ASCENDING AORTIC ANEURYSM REPAIR: ICD-10-CM

## 2019-11-11 DIAGNOSIS — I71.22 AORTIC ARCH ANEURYSM (HCC): ICD-10-CM

## 2019-11-11 LAB — CREAT BLDA-MCNC: 0.8 MG/DL (ref 0.6–1.3)

## 2019-11-11 PROCEDURE — 0 IOPAMIDOL PER 1 ML: Performed by: NURSE PRACTITIONER

## 2019-11-11 PROCEDURE — 82565 ASSAY OF CREATININE: CPT

## 2019-11-11 PROCEDURE — 71275 CT ANGIOGRAPHY CHEST: CPT

## 2019-11-11 RX ADMIN — IOPAMIDOL 95 ML: 755 INJECTION, SOLUTION INTRAVENOUS at 11:45

## 2019-11-12 ENCOUNTER — OFFICE VISIT (OUTPATIENT)
Dept: CARDIAC SURGERY | Facility: CLINIC | Age: 79
End: 2019-11-12

## 2019-11-12 ENCOUNTER — ANTICOAGULATION VISIT (OUTPATIENT)
Dept: PHARMACY | Facility: HOSPITAL | Age: 79
End: 2019-11-12

## 2019-11-12 VITALS
SYSTOLIC BLOOD PRESSURE: 130 MMHG | WEIGHT: 156 LBS | BODY MASS INDEX: 21.84 KG/M2 | HEART RATE: 80 BPM | HEIGHT: 71 IN | TEMPERATURE: 98.5 F | RESPIRATION RATE: 20 BRPM | DIASTOLIC BLOOD PRESSURE: 80 MMHG | OXYGEN SATURATION: 92 %

## 2019-11-12 DIAGNOSIS — Z95.2 MECHANICAL HEART VALVE PRESENT: ICD-10-CM

## 2019-11-12 DIAGNOSIS — Z95.2 H/O AORTIC VALVE REPLACEMENT: ICD-10-CM

## 2019-11-12 DIAGNOSIS — Z98.890 HX OF ASCENDING AORTA REPAIR: ICD-10-CM

## 2019-11-12 DIAGNOSIS — I71.22 AORTIC ARCH ANEURYSM (HCC): Primary | ICD-10-CM

## 2019-11-12 DIAGNOSIS — I71.20 THORACIC AORTIC ANEURYSM WITHOUT RUPTURE (HCC): ICD-10-CM

## 2019-11-12 LAB — INR PPP: 2.9

## 2019-11-12 PROCEDURE — 99214 OFFICE O/P EST MOD 30 MIN: CPT | Performed by: NURSE PRACTITIONER

## 2019-11-12 NOTE — PROGRESS NOTES
Anticoagulation Clinic Progress Note    Anticoagulation Summary  As of 2019    INR goal:   2.5-3.5   TTR:   85.0 % (11 mo)   INR used for dosin.90 (2019)   Warfarin maintenance plan:   7.5 mg every Wed; 5 mg all other days   Weekly warfarin total:   37.5 mg   Plan last modified:   Layla Ortega [facility] (2019)   Next INR check:   2019   Priority:   Maintenance   Target end date:   Indefinite    Indications    H/O aortic valve replacement [Z95.2]  Mechanical heart valve present [Z95.2]             Anticoagulation Episode Summary     INR check location:   Home Draw    Preferred lab:       Send INR reminders to:    KYE HAMPTON  POOL    Comments:   **[facility]**      Anticoagulation Care Providers     Provider Role Specialty Phone number    Donya Gagnon MD Referring Cardiology [phone]          Clinic Interview:  No pertinent clinical findings have been reported.    INR History:  Anticoagulation Monitoring 10/24/2019 2019 2019   INR 2.85 2.30 2.90   INR Date 10/24/2019 2019 2019   INR Goal 2.5-3.5 2.5-3.5 2.5-3.5   Trend Same Same Up   Last Week Total 35 mg 35 mg 37.5 mg   Next Week Total 25 mg 37.5 mg 37.5 mg   Sun Hold (10/27); Otherwise 5 mg 5 mg 5 mg   Mon 5 mg 5 mg 5 mg   Tue 5 mg - 5 mg   Wed 5 mg 7.5 mg () 7.5 mg   Thu 5 mg 5 mg 5 mg   Fri 5 mg 5 mg 5 mg   Sat Hold (10/26); Otherwise 5 mg 5 mg 5 mg   Visit Report - - -   Some recent data might be hidden       Plan:  1. INR is therapeutic today- see above in Anticoagulation Summary. Left HIPPA appropriate message for Bobby L Tapley to increase his weekly dosage regimen from 35mng to 37.5mg/wk.   2. Follow up in 1 week  3. Pt has agreed to only be called if INR out of range. They have been instructed to call if any changes in medications, doses, concerns, etc.     Layla Ortega [facility]

## 2019-11-13 NOTE — PROGRESS NOTES
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 He is a 79 y.o. male with a history of mechanical AV conduit with hemiarch replacement with a graft and MV CABG by Dr. Bailey at [facility] in Feb 2004.  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  He comes in today for routine follow-up for aneurysm surveillence.  The last time he was here he reported a 20 pound recent weight loss and memory changes.  He has gained 14 pounds since last year.  His wife notions to me that his memory is fleeting but he denies that and answers all my questions appropriately. None the less, he is well kept and appears happy.  In October, he had a positive stress test with apical infarct noted and an echo that showed EF 62%, no AS/AI, mild MR with MAC, and trace TR.  He reports that he was set up for a heart cath and Dr. Dunn talked to his family and recommended he not be cathed.  His CP in intermittent and is not limiting, Dr. Gagnon feels it is not anginal.  He was started on Toprol XL for HTN and is tolerating well.  He continues to have exertional SOA that is unchanged and doesn't stop what he wants to do.  The CTA of chest on 10/16/2019 was reviewed with Dr. Cox and is consistent with the radiologists reading of aortic arch measuring 4-4.2 cm.  He denies numbness/tingling/pain of extremities.  No vascular deficiencies or hyperextensible or hypermobile joints are noted on exam.  The patient's family history is questionable for aneurysms or dissections--both his father and oldest brother had sudden death and aneurysms were discussed as cause of  death.  No history of connective tissue disease, and negtive for coronary disease in first degree family members.  His BP today is 130/80.  He reports he is doing well with Coumadin therapy for his mechanical AV conduit.  His wife is with him today for his appt.  Their daughter lives with them and oversees their health issues with them.        Patient Active Problem List   Diagnosis   • H/O aortic valve replacement   • BP (high blood pressure)   • HLD (hyperlipidemia)   • CAD (coronary artery disease)   • Hx of CABG   • hx of mechanical AV conduit ([id number] 23 mm) by Dr. Santana Bailey at [facility] 2/25/2004   • JOY on CPAP   • Hypersomnia with sleep apnea   • Weight loss   • Witnessed episode of apnea   • BPH (benign prostatic hyperplasia)   • Chronic headaches   • CTS (carpal tunnel syndrome)   • Dyslipidemia   • High frequency hearing loss   • Mechanical heart valve present   • Nephrolithiasis   • Osteoarthritis   • Sleep related hypoxia   • Situational depression   • Subcutaneous lipoma   • Leg pain, right   • Aortic arch aneurysm (CMS/HCC)   • Coronary artery disease involving native coronary artery of native heart without angina pectoris   • Angina, class III (CMS/HCC)       Past Medical History:   Diagnosis Date   • Aneurysm of aortic arch (CMS/HCC)    • Aortic insufficiency    • Aortic stenosis    • Arthritis    • Ascending aortic aneurysm (CMS/HCC)    • CAD (coronary artery disease)    • Enlarged prostate    • H/O aortic valve replacement    • H/O echocardiogram 02/10/2015   • Health care maintenance    • History of EKG 07/07/2015   • Hyperlipidemia    • Hypertension    • Kidney stones    • JOY on CPAP    • Precordial pain    • Shortness of breath        Past Surgical History:   Procedure Laterality Date   • AORTIC VALVE REPAIR/REPLACEMENT     • CARDIAC CATHETERIZATION  11/27/2013   • COLONOSCOPY N/A 8/15/2017    Procedure: COLONOSCOPY;  Surgeon: Hu Bergman MD;  Location: [facility] ENDOSCOPY;   Service:    • CORONARY ARTERY BYPASS GRAFT     • ROTATOR CUFF REPAIR         No Known Allergies      Current Outpatient Medications:   •  aspirin 81 MG tablet, Take 81 mg by mouth Daily., Disp: , Rfl:   •  Calcium Carbonate-Vit D-Min (CALTRATE 600+D PLUS MINERALS) 600-800 MG-UNIT tablet, Take 1 tablet by mouth Daily., Disp: , Rfl:   •  colchicine 0.6 MG tablet, Take 0.6 mg by mouth Daily., Disp: , Rfl:   •  docusate sodium (COLACE) 250 MG capsule, Take 250 mg by mouth Daily., Disp: , Rfl:   •  enalapril (VASOTEC) 2.5 MG tablet, Take 2.5 mg by mouth Daily., Disp: , Rfl:   •  finasteride (PROSCAR) 5 MG tablet, Take 5 mg by mouth Daily., Disp: , Rfl:   •  HYDROcodone-acetaminophen (NORCO) 5-325 MG per tablet, Take 1 tablet by mouth as needed., Disp: , Rfl:   •  L-Lysine 500 MG capsule, Take 1 capsule by mouth As Needed., Disp: , Rfl:   •  metoprolol succinate XL (TOPROL-XL) 50 MG 24 hr tablet, Take 1 tablet by mouth Every Night., Disp: 90 tablet, Rfl: 3  •  mirtazapine (REMERON) 15 MG tablet, Take 15 mg by mouth Every Night., Disp: , Rfl:   •  Multiple Vitamins-Minerals (CENTRUM SILVER) tablet, Take 1 tablet by mouth Daily., Disp: , Rfl:   •  omeprazole (priLOSEC) 20 MG capsule, Take 20 mg by mouth Daily., Disp: , Rfl:   •  predniSONE (DELTASONE) 5 MG tablet, Take 5 mg by mouth Daily., Disp: , Rfl:   •  vitamin C (ASCORBIC ACID) 500 MG tablet, Take 1,000 mg by mouth Daily., Disp: , Rfl:   •  warfarin (COUMADIN) 5 MG tablet, Take 1.5 tablets (7.5 mg) by mouth on Mon, Wed, and Fri. Take 1 tablet (5 mg) on all other days or as directed by [facility] (Patient taking differently: Take 5 mg by mouth Every Night. Take 1.5 tablets (7.5 mg) by mouth on Mon, Wed, and Fri. Take 1 tablet (5 mg) on all other days or as directed by [facility]), Disp: 120 tablet, Rfl: 2    Social History     Socioeconomic History   • Marital status:      Spouse name: Not on file   • Number of children: Not on file   • Years of  education: Not on file   • Highest education level: Not on file   Occupational History   • Occupation: retired   Tobacco Use   • Smoking status: Never Smoker   • Smokeless tobacco: Never Used   Substance and Sexual Activity   • Alcohol use: No     Comment: No caffeine use   • Drug use: No   • Sexual activity: Defer       Family History   Problem Relation Age of Onset   • Cancer Mother    • Aneurysm Father    • Heart disease Father    • Aneurysm Brother    • Heart disease Brother    • Hypertension Cousin    • Obesity Cousin    • Lung cancer Cousin    • Malig Hyperthermia Neg Hx            Review of Systems:  Review of Systems   Constitutional: Negative for fatigue and unexpected weight change.   Respiratory: Positive for shortness of breath.    Cardiovascular: Positive for chest pain. Negative for palpitations and leg swelling.   Gastrointestinal: Negative for abdominal pain.   Musculoskeletal: Negative for back pain.   Neurological: Negative for dizziness, tremors, seizures, syncope, weakness, light-headedness and numbness.       Physical Exam:    Vital Signs:  Weight: 70.8 kg (156 lb)   Body mass index is 21.76 kg/m².  Temp: 98.5 °F (36.9 °C)   Heart Rate: 80   BP: 130/80     Physical Exam   Constitutional: He is oriented to person, place, and time. Vital signs are normal. He appears well-developed and well-nourished. He is active and cooperative.   HENT:   Head: Normocephalic and atraumatic.   Nose: Nose normal.   Mouth/Throat: Uvula is midline, oropharynx is clear and moist and mucous membranes are normal.   Eyes: Conjunctivae, EOM and lids are normal. Pupils are equal, round, and reactive to light. No scleral icterus.   Neck: Trachea normal. Neck supple. Normal carotid pulses, no hepatojugular reflux and no JVD present. Carotid bruit is not present. No thyroid mass and no thyromegaly present.   Cardiovascular: Normal rate, regular rhythm, S1 normal and intact distal pulses.   Murmur heard.   Systolic murmur is  "present.  Pulses:       Carotid pulses are 2+ on the right side, and 2+ on the left side.       Radial pulses are 2+ on the right side, and 2+ on the left side.        Femoral pulses are 2+ on the right side, and 2+ on the left side.       Popliteal pulses are 2+ on the right side, and 2+ on the left side.        Dorsalis pedis pulses are 2+ on the right side, and 2+ on the left side.        Posterior tibial pulses are 2+ on the right side, and 2+ on the left side.   Mechanical s2 sound noted   Pulmonary/Chest: Effort normal and breath sounds normal.   Abdominal: Soft. Normal appearance, normal aorta and bowel sounds are normal. He exhibits no distension and no abdominal bruit. There is no hepatosplenomegaly. There is no tenderness.   Musculoskeletal:   Gait steady and strong without use of assistive devices   Lymphadenopathy:        Head (right side): No submental, no submandibular, no tonsillar, no preauricular, no posterior auricular and no occipital adenopathy present.        Head (left side): No submental, no submandibular, no tonsillar, no preauricular, no posterior auricular and no occipital adenopathy present.     He has no cervical adenopathy.        Right: No supraclavicular adenopathy present.        Left: No supraclavicular adenopathy present.   Neurological: He is alert and oriented to person, place, and time. GCS eye subscore is 4. GCS verbal subscore is 5. GCS motor subscore is 6.   Skin: Skin is warm, dry and intact. Capillary refill takes less than 2 seconds. No rash noted. No cyanosis or erythema. Nails show no clubbing.   Old Sternotomy well healed.  SVHS well healed.   Psychiatric: He has a normal mood and affect. His speech is normal and behavior is normal. Judgment and thought content normal. Cognition and memory are normal.   He reports he \"remembers what I want to remember.\"   Nursing note and vitals reviewed.       Assessment:     1.  Ascending aortic aneurysm, s/p mechanical AV conduit " 2004--stable and tolerating Coumadin therapy well  2.  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  HTN--stable    Recommendation/Plan:       Continued risk factor modification of hypertension with a goal blood pressure less than 130/80, hyperlipidemia optimization, and continued avoidance of tobacco/nicotine products.    We discussed the importance of avoidance of over the counter decongestants and stimulants, specifically pseudoephedrine, for hypertension and aneurysm management.    Initiation of low aerobic exercise is indicated to help reduce body habitus, assist with blood pressure and cholesterol control.       Although risk of rupture is low, emergency department presentation is warranted for acute chest, back, or abdominal pain, syncope, or stroke like symptoms.    Follow up in [facility] Clinic in 1 year(s) with CTA of  Chest.    Thank you for allowing us to participate in his care.    Regards,    Rachana Masters, ABBY      **Extensive review of records prior to and during patient visit

## 2019-11-20 ENCOUNTER — ANTICOAGULATION VISIT (OUTPATIENT)
Dept: PHARMACY | Facility: HOSPITAL | Age: 79
End: 2019-11-20

## 2019-11-20 DIAGNOSIS — Z95.2 MECHANICAL HEART VALVE PRESENT: ICD-10-CM

## 2019-11-20 DIAGNOSIS — Z95.2 H/O AORTIC VALVE REPLACEMENT: ICD-10-CM

## 2019-11-20 LAB — INR PPP: 3.3

## 2019-11-20 NOTE — PROGRESS NOTES
Anticoagulation Clinic Progress Note    Anticoagulation Summary  As of 11/20/2019    INR goal:   2.5-3.5   TTR:   85.3 % (11.2 mo)   INR used for dosing:   3.30 (11/19/2019)   Warfarin maintenance plan:   7.5 mg every Wed; 5 mg all other days   Weekly warfarin total:   37.5 mg   No change documented:   Laurie Harvey   Plan last modified:   Layla Ortega, [facility] (11/12/2019)   Next INR check:   12/4/2019   Priority:   High   Target end date:   Indefinite    Indications    H/O aortic valve replacement [Z95.2]  Mechanical heart valve present [Z95.2]             Anticoagulation Episode Summary     INR check location:   Home Draw    Preferred lab:       Send INR reminders to:    [facility]  [facility]    Comments:   **[facility]**      Anticoagulation Care Providers     Provider Role Specialty Phone number    Donya Gagnon MD Referring Cardiology [phone]          Clinic Interview:  No pertinent clinical findings have been reported.    INR History:  Anticoagulation Monitoring 11/6/2019 11/12/2019 11/20/2019   INR 2.30 2.90 3.30   INR Date 11/5/2019 11/12/2019 11/19/2019   INR Goal 2.5-3.5 2.5-3.5 2.5-3.5   Trend Same Up Same   Last Week Total 35 mg 37.5 mg 37.5 mg   Next Week Total 37.5 mg 37.5 mg 37.5 mg   Sun 5 mg 5 mg 5 mg   Mon 5 mg 5 mg 5 mg   Tue - 5 mg 5 mg   Wed 7.5 mg (11/6) 7.5 mg 7.5 mg   Thu 5 mg 5 mg 5 mg   Fri 5 mg 5 mg 5 mg   Sat 5 mg 5 mg 5 mg   Visit Report - - -   Some recent data might be hidden       Plan:  1. INR is therapeutic today- see above in Anticoagulation Summary.    Bobby L Tapley to continue their warfarin regimen- see above in Anticoagulation Summary.  2. Follow up in 2 weeks  3. They have been instructed to call if any changes in medications, doses, concerns, etc. Patient expresses understanding and has no further questions at this time.    Laurie Harvey

## 2019-12-04 ENCOUNTER — ANTICOAGULATION VISIT (OUTPATIENT)
Dept: PHARMACY | Facility: HOSPITAL | Age: 79
End: 2019-12-04

## 2019-12-04 DIAGNOSIS — Z95.2 H/O AORTIC VALVE REPLACEMENT: ICD-10-CM

## 2019-12-04 DIAGNOSIS — Z95.2 MECHANICAL HEART VALVE PRESENT: ICD-10-CM

## 2019-12-04 LAB — INR PPP: 3.5

## 2019-12-04 NOTE — PROGRESS NOTES
Anticoagulation Clinic Progress Note    Anticoagulation Summary  As of 12/4/2019    INR goal:   2.5-3.5   TTR:   85.9 % (11.7 mo)   INR used for dosing:   3.50 (12/4/2019)   Warfarin maintenance plan:   7.5 mg every Wed; 5 mg all other days   Weekly warfarin total:   37.5 mg   No change documented:   Regis Chu RPH   Plan last modified:   Layla Ortega RPH (11/12/2019)   Next INR check:   12/18/2019   Priority:   High   Target end date:   Indefinite    Indications    H/O aortic valve replacement [Z95.2]  Mechanical heart valve present [Z95.2]             Anticoagulation Episode Summary     INR check location:   Home Draw    Preferred lab:       Send INR reminders to:    [facility]  [facility]    Comments:   **[facility]**      Anticoagulation Care Providers     Provider Role Specialty Phone number    Donya Gagnon MD Referring Cardiology [phone]          Clinic Interview:  No pertinent clinical findings have been reported.    INR History:  Anticoagulation Monitoring 11/12/2019 11/20/2019 12/4/2019   INR 2.90 3.30 3.50   INR Date 11/12/2019 11/19/2019 12/4/2019   INR Goal 2.5-3.5 2.5-3.5 2.5-3.5   Trend Up Same Same   Last Week Total 37.5 mg 37.5 mg 37.5 mg   Next Week Total 37.5 mg 37.5 mg 37.5 mg   Sun 5 mg 5 mg 5 mg   Mon 5 mg 5 mg 5 mg   Tue 5 mg 5 mg 5 mg   Wed 7.5 mg 7.5 mg 7.5 mg   Thu 5 mg 5 mg 5 mg   Fri 5 mg 5 mg 5 mg   Sat 5 mg 5 mg 5 mg   Visit Report - - -   Some recent data might be hidden       Plan:  1. INR is therapeutic today- see above in Anticoagulation Summary.    Bobby L Tapley to continue their warfarin regimen- see above in Anticoagulation Summary.  2. Follow up in 2 weeks  3. Pt has agreed to only be called if INR out of range. They have been instructed to call if any changes in medications, doses, concerns, etc. Patient expresses understanding and has no further questions at this time.    Regis Chu RPH

## 2019-12-18 ENCOUNTER — ANTICOAGULATION VISIT (OUTPATIENT)
Dept: PHARMACY | Facility: HOSPITAL | Age: 79
End: 2019-12-18

## 2019-12-18 DIAGNOSIS — Z95.2 H/O AORTIC VALVE REPLACEMENT: ICD-10-CM

## 2019-12-18 DIAGNOSIS — Z95.2 MECHANICAL HEART VALVE PRESENT: ICD-10-CM

## 2019-12-18 LAB — INR PPP: 3

## 2019-12-18 NOTE — PROGRESS NOTES
Anticoagulation Clinic Progress Note    Anticoagulation Summary  As of 12/18/2019    INR goal:   2.5-3.5   TTR:   86.5 % (1 y)   INR used for dosing:   3.00 (12/18/2019)   Warfarin maintenance plan:   7.5 mg every Wed; 5 mg all other days   Weekly warfarin total:   37.5 mg   No change documented:   Amparo Echeverria   Plan last modified:   Layla Ortega, [facility] (11/12/2019)   Next INR check:   1/3/2020   Priority:   High   Target end date:   Indefinite    Indications    H/O aortic valve replacement [Z95.2]  Mechanical heart valve present [Z95.2]             Anticoagulation Episode Summary     INR check location:   Home Draw    Preferred lab:       Send INR reminders to:    [facility]  PO[facility]    Comments:   **[facility]**      Anticoagulation Care Providers     Provider Role Specialty Phone number    Donya Gagnon MD Referring Cardiology [phone]          Clinic Interview:  No pertinent clinical findings have been reported.    INR History:  Anticoagulation Monitoring 11/20/2019 12/4/2019 12/18/2019   INR 3.30 3.50 3.00   INR Date 11/19/2019 12/4/2019 12/18/2019   INR Goal 2.5-3.5 2.5-3.5 2.5-3.5   Trend Same Same Same   Last Week Total 37.5 mg 37.5 mg 37.5 mg   Next Week Total 37.5 mg 37.5 mg 37.5 mg   Sun 5 mg 5 mg 5 mg   Mon 5 mg 5 mg 5 mg   Tue 5 mg 5 mg 5 mg   Wed 7.5 mg 7.5 mg 7.5 mg   Thu 5 mg 5 mg 5 mg   Fri 5 mg 5 mg 5 mg   Sat 5 mg 5 mg 5 mg   Visit Report - - -   Some recent data might be hidden       Plan:  1. INR is therapeutic today- see above in Anticoagulation Summary.    Bobby L Tapley to continue their warfarin regimen- see above in Anticoagulation Summary.  2. Follow up in 2 weeks  3. Pt has agreed to only be called if INR out of range. They have been instructed to call if any changes in medications, doses, concerns, etc. Patient expresses understanding and has no further questions at this time.    Amparo Echeverria

## 2020-01-01 LAB — INR PPP: 3.5

## 2020-01-02 ENCOUNTER — ANTICOAGULATION VISIT (OUTPATIENT)
Dept: PHARMACY | Facility: HOSPITAL | Age: 80
End: 2020-01-02

## 2020-01-02 DIAGNOSIS — Z95.2 MECHANICAL HEART VALVE PRESENT: ICD-10-CM

## 2020-01-02 DIAGNOSIS — Z95.2 H/O AORTIC VALVE REPLACEMENT: ICD-10-CM

## 2020-01-02 NOTE — PROGRESS NOTES
Anticoagulation Clinic Progress Note    Anticoagulation Summary  As of 1/2/2020    INR goal:   2.5-3.5   TTR:   87.0 % (1 y)   INR used for dosing:   3.50 (1/1/2020)   Warfarin maintenance plan:   7.5 mg every Wed; 5 mg all other days   Weekly warfarin total:   37.5 mg   No change documented:   Amparo Echeverria   Plan last modified:   Layla Ortega, [facility] (11/12/2019)   Next INR check:   1/16/2020   Priority:   High   Target end date:   Indefinite    Indications    H/O aortic valve replacement [Z95.2]  Mechanical heart valve present [Z95.2]             Anticoagulation Episode Summary     INR check location:   Home Draw    Preferred lab:       Send INR reminders to:    [facility]  PO[facility]    Comments:   **[facility]**      Anticoagulation Care Providers     Provider Role Specialty Phone number    Donya Gagnon MD Referring Cardiology [phone]          Clinic Interview:  No pertinent clinical findings have been reported.    INR History:  Anticoagulation Monitoring 12/4/2019 12/18/2019 1/2/2020   INR 3.50 3.00 3.50   INR Date 12/4/2019 12/18/2019 1/1/2020   INR Goal 2.5-3.5 2.5-3.5 2.5-3.5   Trend Same Same Same   Last Week Total 37.5 mg 37.5 mg 37.5 mg   Next Week Total 37.5 mg 37.5 mg 37.5 mg   Sun 5 mg 5 mg 5 mg   Mon 5 mg 5 mg 5 mg   Tue 5 mg 5 mg 5 mg   Wed 7.5 mg 7.5 mg 7.5 mg   Thu 5 mg 5 mg 5 mg   Fri 5 mg 5 mg 5 mg   Sat 5 mg 5 mg 5 mg   Visit Report - - -   Some recent data might be hidden       Plan:  1. INR is therapeutic today- see above in Anticoagulation Summary.    Bobby L Tapley to continue their warfarin regimen- see above in Anticoagulation Summary.  2. Follow up in 2 weeks  3. Pt has agreed to only be called if INR out of range. They have been instructed to call if any changes in medications, doses, concerns, etc. Patient expresses understanding and has no further questions at this time.    Amparo Echeverria

## 2020-01-13 RX ORDER — WARFARIN SODIUM 5 MG/1
TABLET ORAL
Qty: 120 TABLET | Refills: 2 | Status: SHIPPED | OUTPATIENT
Start: 2020-01-13 | End: 2020-09-23

## 2020-01-16 ENCOUNTER — ANTICOAGULATION VISIT (OUTPATIENT)
Dept: PHARMACY | Facility: HOSPITAL | Age: 80
End: 2020-01-16

## 2020-01-16 DIAGNOSIS — Z95.2 H/O AORTIC VALVE REPLACEMENT: ICD-10-CM

## 2020-01-16 DIAGNOSIS — Z95.2 MECHANICAL HEART VALVE PRESENT: ICD-10-CM

## 2020-01-16 LAB — INR PPP: 3

## 2020-01-16 NOTE — PROGRESS NOTES
Anticoagulation Clinic Progress Note    Anticoagulation Summary  As of 1/16/2020    INR goal:   2.5-3.5   TTR:   87.5 % (1.1 y)   INR used for dosing:   3.00 (1/16/2020)   Warfarin maintenance plan:   7.5 mg every Wed; 5 mg all other days   Weekly warfarin total:   37.5 mg   No change documented:   Laurie Harvey   Plan last modified:   Layla Ortega, [facility] (11/12/2019)   Next INR check:   1/30/2020   Priority:   High   Target end date:   Indefinite    Indications    H/O aortic valve replacement [Z95.2]  Mechanical heart valve present [Z95.2]             Anticoagulation Episode Summary     INR check location:   Home Draw    Preferred lab:       Send INR reminders to:    [facility]  POOL    Comments:   **[facility]**      Anticoagulation Care Providers     Provider Role Specialty Phone number    Donya Gagnon MD Referring Cardiology [phone]          Clinic Interview:  No pertinent clinical findings have been reported.    INR History:  Anticoagulation Monitoring 12/18/2019 1/2/2020 1/16/2020   INR 3.00 3.50 3.00   INR Date 12/18/2019 1/1/2020 1/16/2020   INR Goal 2.5-3.5 2.5-3.5 2.5-3.5   Trend Same Same Same   Last Week Total 37.5 mg 37.5 mg 37.5 mg   Next Week Total 37.5 mg 37.5 mg 37.5 mg   Sun 5 mg 5 mg 5 mg   Mon 5 mg 5 mg 5 mg   Tue 5 mg 5 mg 5 mg   Wed 7.5 mg 7.5 mg 7.5 mg   Thu 5 mg 5 mg 5 mg   Fri 5 mg 5 mg 5 mg   Sat 5 mg 5 mg 5 mg   Visit Report - - -   Some recent data might be hidden       Plan:  1. INR is therapeutic today- see above in Anticoagulation Summary.    Bobby L Tapley to continue their warfarin regimen- see above in Anticoagulation Summary.  2. Follow up in 2 weeks  3. Pt has agreed to only be called if INR out of range. They have been instructed to call if any changes in medications, doses, concerns, etc. Patient expresses understanding and has no further questions at this time.    Laurie Harvey

## 2020-01-29 ENCOUNTER — ANTICOAGULATION VISIT (OUTPATIENT)
Dept: PHARMACY | Facility: HOSPITAL | Age: 80
End: 2020-01-29

## 2020-01-29 DIAGNOSIS — Z95.2 H/O AORTIC VALVE REPLACEMENT: ICD-10-CM

## 2020-01-29 DIAGNOSIS — Z95.2 MECHANICAL HEART VALVE PRESENT: ICD-10-CM

## 2020-01-29 LAB — INR PPP: 3.5

## 2020-01-29 NOTE — PROGRESS NOTES
Anticoagulation Clinic Progress Note    Anticoagulation Summary  As of 1/29/2020    INR goal:   2.5-3.5   TTR:   87.9 % (1.1 y)   INR used for dosing:   3.50 (1/29/2020)   Warfarin maintenance plan:   7.5 mg every Wed; 5 mg all other days   Weekly warfarin total:   37.5 mg   No change documented:   Laurie Harvey   Plan last modified:   Layla Ortega, [facility] (11/12/2019)   Next INR check:   2/12/2020   Priority:   High   Target end date:   Indefinite    Indications    H/O aortic valve replacement [Z95.2]  Mechanical heart valve present [Z95.2]             Anticoagulation Episode Summary     INR check location:   Home Draw    Preferred lab:       Send INR reminders to:    [facility]  POOL    Comments:   **[facility]**      Anticoagulation Care Providers     Provider Role Specialty Phone number    Donya Gagnon MD Referring Cardiology [phone]          Clinic Interview:  No pertinent clinical findings have been reported.    INR History:  Anticoagulation Monitoring 1/2/2020 1/16/2020 1/29/2020   INR 3.50 3.00 3.50   INR Date 1/1/2020 1/16/2020 1/29/2020   INR Goal 2.5-3.5 2.5-3.5 2.5-3.5   Trend Same Same Same   Last Week Total 37.5 mg 37.5 mg 37.5 mg   Next Week Total 37.5 mg 37.5 mg 37.5 mg   Sun 5 mg 5 mg 5 mg   Mon 5 mg 5 mg 5 mg   Tue 5 mg 5 mg 5 mg   Wed 7.5 mg 7.5 mg 7.5 mg   Thu 5 mg 5 mg 5 mg   Fri 5 mg 5 mg 5 mg   Sat 5 mg 5 mg 5 mg   Visit Report - - -   Some recent data might be hidden       Plan:  1. INR is therapeutic today- see above in Anticoagulation Summary.    Bobby L Tapley to continue their warfarin regimen- see above in Anticoagulation Summary.  2. Follow up in 2 weeks  3. Pt has agreed to only be called if INR out of range. They have been instructed to call if any changes in medications, doses, concerns, etc. Patient expresses understanding and has no further questions at this time.    Laurie Harvey

## 2020-02-07 ENCOUNTER — OFFICE VISIT (OUTPATIENT)
Dept: CARDIOLOGY | Facility: CLINIC | Age: 80
End: 2020-02-07

## 2020-02-07 VITALS
WEIGHT: 159.6 LBS | BODY MASS INDEX: 22.34 KG/M2 | HEIGHT: 71 IN | SYSTOLIC BLOOD PRESSURE: 148 MMHG | HEART RATE: 71 BPM | DIASTOLIC BLOOD PRESSURE: 84 MMHG

## 2020-02-07 DIAGNOSIS — I71.22 AORTIC ARCH ANEURYSM (HCC): ICD-10-CM

## 2020-02-07 DIAGNOSIS — I25.10 CORONARY ARTERY DISEASE INVOLVING NATIVE CORONARY ARTERY OF NATIVE HEART WITHOUT ANGINA PECTORIS: Primary | ICD-10-CM

## 2020-02-07 DIAGNOSIS — R41.3 MEMORY LOSS: ICD-10-CM

## 2020-02-07 DIAGNOSIS — I10 ESSENTIAL HYPERTENSION: ICD-10-CM

## 2020-02-07 DIAGNOSIS — R06.09 DYSPNEA ON EXERTION: ICD-10-CM

## 2020-02-07 DIAGNOSIS — Z98.890 HX OF ASCENDING AORTA REPAIR: ICD-10-CM

## 2020-02-07 DIAGNOSIS — R07.89 OTHER CHEST PAIN: ICD-10-CM

## 2020-02-07 DIAGNOSIS — G47.33 OSA ON CPAP: ICD-10-CM

## 2020-02-07 DIAGNOSIS — Z99.89 OSA ON CPAP: ICD-10-CM

## 2020-02-07 DIAGNOSIS — I77.810 ASCENDING AORTA DILATION (HCC): ICD-10-CM

## 2020-02-07 PROBLEM — I20.9 ANGINA, CLASS III (HCC): Status: RESOLVED | Noted: 2019-10-18 | Resolved: 2020-02-07

## 2020-02-07 PROCEDURE — 99214 OFFICE O/P EST MOD 30 MIN: CPT | Performed by: NURSE PRACTITIONER

## 2020-02-07 PROCEDURE — 93000 ELECTROCARDIOGRAM COMPLETE: CPT | Performed by: NURSE PRACTITIONER

## 2020-02-07 RX ORDER — PREDNISONE 1 MG/1
7.5 TABLET ORAL DAILY
COMMUNITY
Start: 2020-02-06 | End: 2020-05-06

## 2020-02-07 NOTE — PROGRESS NOTES
Date of Office Visit: 2020  Encounter Provider: ABBY Lorenzo  Place of Service: [facility] CARDIOLOGY  Patient Name: Bobby L Tapley  :1940  Primary Cardiologist: Dr. Donya Gagnon     Chief Complaint   Patient presents with   • Coronary Artery Disease   • Follow-up   :     Dear Dr. Adam,    HPI: Bobby L Tapley is a pleasant 80 y.o. male who presents today for cardiac follow up.     In , he was diagnosed with aortic stenosis and underwent mechanical aortic valve replacement, Bentall procedure, transverse thoracic aorta repair, and CABG.  In 2012, he had a repeat catheterization which showed an occluded saphenous vein graft to the LAD.  In 2015, he had an echocardiogram showed normal functioning mechanical aortic valve.      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  In 2017, he had a repeat echocardiogram which revealed an EF of 62% and normal functioning mechanical aortic valve.  In 2018, he had another CTA of the chest which showed stable postoperative changes involving the aortic root and ascending thoracic aorta and mild dilation of the aortic arch unchanged from the previous year.  His aorta and CT scans are followed by Dr. Mendez Cox.    In 2018, he followed up with Dr. Gagnon and was doing well at that time.  In 2019, he followed up with me and reported intermittent midsternal chest pressure with exertional activities that resolves with rest within 15-20 minutes with accompanying shortness of breath and fatigue.  On 10/11/2019, he had a 2D echocardiogram which revealed the following: EF 62%, normal functioning mechanical aortic valve, ascending aortic graft present, mild dilation of the ascending  "aorta, and mild mitral valve regurgitation.  He also had a stress test completed which showed a small size infarct in the apical area with no significant ischemia.  Low risk study with an overall impression of a low risk study.    On 10/28/2019, he was recommended he met with Dr. Michele Dunn in the hospital for a cardiac catheterization since he continued to have exertional symptoms of chest pain and shortness of breath.  When he met with Dr. Dunn he said that the left-sided chest pain was not related to activity, would last a few seconds, and he would get it once a month or once every other month.  He did not feel that his shortness of breath was clearly precipitated by activity and reported occasional gas pain.  Dr. Dunn quoted \"in fairness his wife says that is not really what you told the doctor in the office\".  Dr. Dunn recommended just to treat him medically and not proceed with a cardiac catheterization.  He discussed this with Dr. Gagnon and she agreed.    On 11/7/2019 he followed up in the office with Dr. Gagnon.  He reported midsternal chest pain that was intermittent, nonexertional, and happening every 5 days and chronic shortness of breath with exertion that was unchanged.  Dr. Gagnon started him on metoprolol succinate 50 mg at nighttime and recommended to follow-up with me in 3 months.  She did not feel that he needed any further ischemic testing such as a cardiac catheterization.  She recommended a repeat CT scan of the chest.    On 11/11/2019 he had a CTA of the chest which showed postoperative changes involving the ascending thoracic aorta and aortic root unchanged from previous CTA in October 2018.  In November he followed up with CV surgery and they felt that he was stable.  They plan to repeat the CTA of the chest in 1 year.    He presents today with his wife accompanying him.  She says he has been on his steroid therapy for rheumatoid arthritis.  He reports some neck pain, and " occasionally still has that chest pain which she is unable to describe today.  He denies shortness of air, PND, orthopnea, edema, palpitations, dizziness, or syncope.  Blood pressure is elevated today and she states is because he is not taking his blood pressure medicines because he thinks his blood pressure is too low at home.  We do not have those readings.  He remains on warfarin and is checking his INRs at home.  He denies bleeding.    Past Medical History:   Diagnosis Date   • Aneurysm of aortic arch (CMS/HCC)    • Aortic insufficiency    • Aortic stenosis    • Arthritis    • Ascending aortic aneurysm (CMS/HCC)    • CAD (coronary artery disease)    • Enlarged prostate    • H/O aortic valve replacement    • H/O echocardiogram 02/10/2015   • Health care maintenance    • History of EKG 07/07/2015   • Hyperlipidemia    • Hypertension    • Kidney stones    • JOY on CPAP    • Precordial pain    • Shortness of breath        Past Surgical History:   Procedure Laterality Date   • AORTIC VALVE REPAIR/REPLACEMENT     • CARDIAC CATHETERIZATION  11/27/2013   • COLONOSCOPY N/A 8/15/2017    Procedure: COLONOSCOPY;  Surgeon: Hu Bergman MD;  Location: [facility] ENDOSCOPY;  Service:    • CORONARY ARTERY BYPASS GRAFT     • ROTATOR CUFF REPAIR         Social History     Socioeconomic History   • Marital status:      Spouse name: Not on file   • Number of children: Not on file   • Years of education: Not on file   • Highest education level: Not on file   Occupational History   • Occupation: retired   Tobacco Use   • Smoking status: Never Smoker   • Smokeless tobacco: Never Used   Substance and Sexual Activity   • Alcohol use: No     Comment: No caffeine use   • Drug use: No   • Sexual activity: Defer       Family History   Problem Relation Age of Onset   • Cancer Mother    • Aneurysm Father    • Heart disease Father    • Aneurysm Brother    • Heart disease Brother    • Hypertension Cousin    • Obesity Cousin    • Lung  cancer Cousin    • Malig Hyperthermia Neg Hx        The following portion of the patient's history were reviewed and updated as appropriate: past medical history, past surgical history, past social history, past family history, allergies, current medications, and problem list.    Review of Systems   Constitution: Negative for chills, diaphoresis, fever, malaise/fatigue, night sweats, weight gain and weight loss.   HENT: Negative for hearing loss, nosebleeds, sore throat and tinnitus.    Eyes: Negative for blurred vision, double vision, pain and visual disturbance.   Cardiovascular: Positive for chest pain. Negative for claudication, cyanosis, dyspnea on exertion, irregular heartbeat, leg swelling, near-syncope, orthopnea, palpitations, paroxysmal nocturnal dyspnea and syncope.   Respiratory: Negative for cough, hemoptysis, shortness of breath, snoring and wheezing.    Endocrine: Negative for cold intolerance, heat intolerance and polyuria.   Hematologic/Lymphatic: Negative for bleeding problem. Does not bruise/bleed easily.   Skin: Negative for color change, dry skin, flushing and itching.   Musculoskeletal: Negative for falls, joint pain, joint swelling, muscle cramps, muscle weakness and myalgias.   Gastrointestinal: Negative for abdominal pain, constipation, heartburn, melena, nausea and vomiting.   Genitourinary: Negative for dysuria and hematuria.   Neurological: Negative for excessive daytime sleepiness, dizziness, light-headedness, loss of balance, numbness, paresthesias, seizures and vertigo.   Psychiatric/Behavioral: Positive for depression. Negative for altered mental status, memory loss and substance abuse. The patient does not have insomnia and is not nervous/anxious.    Allergic/Immunologic: Negative for environmental allergies.       No Known Allergies      Current Outpatient Medications:   •  aspirin 81 MG tablet, Take 81 mg by mouth Daily., Disp: , Rfl:   •  Calcium Carbonate-Vit D-Min (CALTRATE  "600+D PLUS MINERALS) 600-800 MG-UNIT tablet, Take 1 tablet by mouth Daily., Disp: , Rfl:   •  docusate sodium (COLACE) 250 MG capsule, Take 250 mg by mouth Daily., Disp: , Rfl:   •  enalapril (VASOTEC) 2.5 MG tablet, Take 2.5 mg by mouth Daily., Disp: , Rfl:   •  finasteride (PROSCAR) 5 MG tablet, Take 5 mg by mouth Daily., Disp: , Rfl:   •  HYDROcodone-acetaminophen (NORCO) 5-325 MG per tablet, Take 1 tablet by mouth as needed., Disp: , Rfl:   •  L-Lysine 500 MG capsule, Take 1 capsule by mouth As Needed., Disp: , Rfl:   •  metoprolol succinate XL (TOPROL-XL) 50 MG 24 hr tablet, Take 1 tablet by mouth Every Night., Disp: 90 tablet, Rfl: 3  •  Multiple Vitamins-Minerals (CENTRUM SILVER) tablet, Take 1 tablet by mouth Daily., Disp: , Rfl:   •  omeprazole (priLOSEC) 20 MG capsule, Take 20 mg by mouth Daily., Disp: , Rfl:   •  predniSONE (DELTASONE) 5 MG tablet, Take 7.5 mg by mouth Daily., Disp: , Rfl:   •  vitamin C (ASCORBIC ACID) 500 MG tablet, Take 1,000 mg by mouth Daily., Disp: , Rfl:   •  warfarin (COUMADIN) 5 MG tablet, Take 1.5 tablets (7.5 mg) by mouth on Mon, Wed, and Fri. Take 1 tablet (5 mg) on all other days or as directed by [facility], Disp: 120 tablet, Rfl: 2        Objective:     Vitals:    02/07/20 1512   BP: 148/84   BP Location: Left arm   Pulse: 71   Weight: 72.4 kg (159 lb 9.6 oz)   Height: 180.3 cm (71\")     Body mass index is 22.26 kg/m².    PHYSICAL EXAM:    Vitals Reviewed.   General Appearance: No acute distress, well developed and well nourished.    Eyes: Conjunctiva and lids: No erythema, swelling, or discharge. Sclera non-icteric.   HENT: Atraumatic, normocephalic. External eyes, ears, and nose normal. No hearing loss noted. Mucous membranes normal. Lips not cyanotic. Neck supple with no tenderness.  Respiratory: No signs of respiratory distress. Respiration rhythm and depth normal.   Clear to auscultation. No rales, crackles, rhonchi, or wheezing auscultated. "   Cardiovascular:  Jugular Venous Pressure: Normal  Heart Rate and Rhythm: Normal, Heart Sounds: Mechanical valve S1 and S2. No S3 or S4 noted.  Murmurs: No murmurs noted. No rubs, thrills, or gallops.   Lower Extremities: No edema noted.  Gastrointestinal:  Abdomen soft, non-distended, non-tender. Normal bowel sounds. No hepatomegaly.   Musculoskeletal: Normal movement of extremities  Skin and Nails: General appearance normal. No pallor, cyanosis, diaphoresis. Skin temperature normal. No clubbing of fingernails.   Psychiatric: Patient alert and oriented to person, place, and time. Speech and behavior appropriate. Normal mood and affect.       ECG 12 Lead  Date/Time: 2/7/2020 3:15 PM  Performed by: Ro Guillaume APRN  Authorized by: Ro Guillaume APRN   Comparison: compared with previous ECG   Similar to previous ECG  Rhythm: sinus rhythm  Rate: normal  BPM: 71  Conduction: left anterior fascicular block  ST Segments: ST segments normal  T Waves: T waves normal  QRS axis: normal  Other findings: poor R wave progression    Clinical impression: normal ECG and non-specific ECG              Assessment:       Diagnosis Plan   1. Coronary artery disease involving native coronary artery of native heart without angina pectoris     2. hx of mechanical AV conduit ([id number] 23 mm) by Dr. Santana Bailey at [facility] 2/25/2004     3. Aortic arch aneurysm (CMS/HCC)     4. Ascending aorta dilation (CMS/HCC)     5. Dyspnea on exertion     6. Other chest pain     7. Essential hypertension  Basic Metabolic Panel   8. JOY on CPAP     9. Memory loss            Plan:       1.  Coronary Artery Disease: Status post coronary artery bypass graft surgery.  In 2019, he was experiencing chest pain which was felt to be noncardiac and had a stress test which showed no ischemia.  Dr. Gagnon and Dr. Dunn did not feel the patient needed to proceed with a cardiac catheterization.  Continue aspirin and metoprolol.      Coronary  Artery Disease  Assessment  • The patient has CCS class I - angina only during strenuous or prolonged physical activity    Plan  • Lifestyle modifications discussed include adhering to a heart healthy diet, maintenance of a healthy weight, medication compliance, regular exercise and regular monitoring of cholesterol and blood pressure    Subjective - Objective  • There is a history of previous coronary artery bypass graft  • Current antiplatelet therapy includes aspirin 81 mg      2/3/4.  Status post Ascending Aortic Aneurysm Repair: Followed by Dr. Cox.  Stable CTA of the chest.  CV surgery to repeat in 1 year.  Remains on warfarin for mechanical valve.    5.  Dyspnea: Occurs on exertion and has been chronic and unchanged.    6.  Chest Pain: Felt to be noncardiac.    7.  Hypertension: Blood pressure elevated today.  He has not been taking blood pressure medications because he thinks his blood pressure is running too low at home.  His wife said she is going to start checking his blood pressure and giving him his medications.  She will follow-up with me via telephone in a couple of weeks.    8.  Obstructive Sleep Apnea: Moderate and compliant with CPAP machine.    9.  Memory Loss: Upon review of the different providers office notes he always describes his chest pain differently.  His wife admits that he often has memory loss and does not always tell her about his symptoms.  I think it may be beneficial for him to follow-up with his PCP about his memory decline over the past year according to his wife.    10.  I have also recommended follow-up with Dr. Gagnon in 6 months.    As always, it has been a pleasure to participate in your patient's care. Thank you.       Sincerely,         ABBY Fleming        Dictated utilizing Dragon dictation

## 2020-02-12 ENCOUNTER — ANTICOAGULATION VISIT (OUTPATIENT)
Dept: PHARMACY | Facility: HOSPITAL | Age: 80
End: 2020-02-12

## 2020-02-12 DIAGNOSIS — Z95.2 H/O AORTIC VALVE REPLACEMENT: ICD-10-CM

## 2020-02-12 DIAGNOSIS — Z95.2 MECHANICAL HEART VALVE PRESENT: ICD-10-CM

## 2020-02-12 LAB — INR PPP: 3.5

## 2020-02-12 NOTE — PROGRESS NOTES
Anticoagulation Clinic Progress Note    Anticoagulation Summary  As of 2/12/2020    INR goal:   2.5-3.5   TTR:   88.3 % (1.2 y)   INR used for dosing:   3.50 (2/12/2020)   Warfarin maintenance plan:   7.5 mg every Wed; 5 mg all other days   Weekly warfarin total:   37.5 mg   No change documented:   Amparo Echeverria   Plan last modified:   Layla Ortega, [facility] (11/12/2019)   Next INR check:   2/26/2020   Priority:   High   Target end date:   Indefinite    Indications    H/O aortic valve replacement [Z95.2]  Mechanical heart valve present [Z95.2]             Anticoagulation Episode Summary     INR check location:   Home Draw    Preferred lab:       Send INR reminders to:   [facility]  [facility]    Comments:   **[facility]**      Anticoagulation Care Providers     Provider Role Specialty Phone number    Donya Gagnon MD Referring Cardiology [phone]          Clinic Interview:  No pertinent clinical findings have been reported.    INR History:  Anticoagulation Monitoring 1/16/2020 1/29/2020 2/12/2020   INR 3.00 3.50 3.50   INR Date 1/16/2020 1/29/2020 2/12/2020   INR Goal 2.5-3.5 2.5-3.5 2.5-3.5   Trend Same Same Same   Last Week Total 37.5 mg 37.5 mg 37.5 mg   Next Week Total 37.5 mg 37.5 mg 37.5 mg   Sun 5 mg 5 mg 5 mg   Mon 5 mg 5 mg 5 mg   Tue 5 mg 5 mg 5 mg   Wed 7.5 mg 7.5 mg 7.5 mg   Thu 5 mg 5 mg 5 mg   Fri 5 mg 5 mg 5 mg   Sat 5 mg 5 mg 5 mg   Visit Report - - -   Some recent data might be hidden       Plan:  1. INR is therapeutic today- see above in Anticoagulation Summary.    Bobby L Tapley to continue their warfarin regimen- see above in Anticoagulation Summary.  2. Follow up in 2 weeks  3. Pt has agreed to only be called if INR out of range. They have been instructed to call if any changes in medications, doses, concerns, etc. Patient expresses understanding and has no further questions at this time.    Amparo Echeverria

## 2020-02-26 ENCOUNTER — ANTICOAGULATION VISIT (OUTPATIENT)
Dept: PHARMACY | Facility: HOSPITAL | Age: 80
End: 2020-02-26

## 2020-02-26 DIAGNOSIS — Z95.2 H/O AORTIC VALVE REPLACEMENT: ICD-10-CM

## 2020-02-26 DIAGNOSIS — Z95.2 MECHANICAL HEART VALVE PRESENT: ICD-10-CM

## 2020-02-26 LAB — INR PPP: 3.6

## 2020-02-26 NOTE — PROGRESS NOTES
Anticoagulation Clinic Progress Note    Anticoagulation Summary  As of 2/26/2020    INR goal:   2.5-3.5   TTR:   85.4 % (1.2 y)   INR used for dosing:   3.60! (2/26/2020)   Warfarin maintenance plan:   7.5 mg every Wed; 5 mg all other days   Weekly warfarin total:   37.5 mg   No change documented:   Azar Cruz, [facility]   Plan last modified:   Layla Ortega [facility] (11/12/2019)   Next INR check:   3/11/2020   Priority:   High   Target end date:   Indefinite    Indications    H/O aortic valve replacement [Z95.2]  Mechanical heart valve present [Z95.2]             Anticoagulation Episode Summary     INR check location:   Home Draw    Preferred lab:       Send INR reminders to:   [facility]  [facility]    Comments:   **[facility]**      Anticoagulation Care Providers     Provider Role Specialty Phone number    Donya Gagnon MD Referring Cardiology [phone]          Clinic Interview:  Patient Findings     Negatives:   Signs/symptoms of thrombosis, Signs/symptoms of bleeding,   Laboratory test error suspected, Change in health, Change in alcohol use,   Change in activity, Upcoming invasive procedure, Emergency department   visit, Upcoming dental procedure, Missed doses, Extra doses, Change in   medications, Change in diet/appetite, Hospital admission, Bruising, Other   complaints      Clinical Outcomes     Negatives:   Major bleeding event, Thromboembolic event,   Anticoagulation-related hospital admission, Anticoagulation-related ED   visit, Anticoagulation-related fatality        INR History:  Anticoagulation Monitoring 1/29/2020 2/12/2020 2/26/2020   INR 3.50 3.50 3.60   INR Date 1/29/2020 2/12/2020 2/26/2020   INR Goal 2.5-3.5 2.5-3.5 2.5-3.5   Trend Same Same Same   Last Week Total 37.5 mg 37.5 mg 37.5 mg   Next Week Total 37.5 mg 37.5 mg 37.5 mg   Sun 5 mg 5 mg 5 mg   Mon 5 mg 5 mg 5 mg   Tue 5 mg 5 mg 5 mg   Wed 7.5 mg 7.5 mg 7.5 mg   Thu 5 mg 5 mg 5 mg   Fri 5 mg 5 mg 5 mg   Sat  5 mg 5 mg 5 mg   Visit Report - - -   Some recent data might be hidden       Plan:  1. INR is Supratherapeutic today- see above in Anticoagulation Summary.   Will instruct Song Garcialey to Continue their warfarin regimen- see above in Anticoagulation Summary.  2. Follow up in 2 weeks  3.  Pt has agreed to only be called if INR out of range. They have been instructed to call if any changes in medications, doses, concerns, etc. Patient expresses understanding and has no further questions at this time.    Azar Cruz [facility]

## 2020-03-11 ENCOUNTER — TELEPHONE (OUTPATIENT)
Dept: CARDIOLOGY | Facility: CLINIC | Age: 80
End: 2020-03-11

## 2020-03-11 LAB — INR PPP: 2.9

## 2020-03-11 NOTE — TELEPHONE ENCOUNTER
On 2/7/2020 at office visit with you:  7.  Hypertension: Blood pressure elevated today.  He has not been taking blood pressure medications because he thinks his blood pressure is running too low at home.  His wife said she is going to start checking his blood pressure and giving him his medications.  She will follow-up with me via telephone in a couple of weeks.    Pts wife called to report that his blood pressure is averaging 137/72 and he is doing good

## 2020-03-12 ENCOUNTER — ANTICOAGULATION VISIT (OUTPATIENT)
Dept: PHARMACY | Facility: HOSPITAL | Age: 80
End: 2020-03-12

## 2020-03-12 DIAGNOSIS — Z95.2 MECHANICAL HEART VALVE PRESENT: ICD-10-CM

## 2020-03-12 DIAGNOSIS — Z95.2 H/O AORTIC VALVE REPLACEMENT: ICD-10-CM

## 2020-03-12 NOTE — PROGRESS NOTES
Anticoagulation Clinic Progress Note    Anticoagulation Summary  As of 3/12/2020    INR goal:   2.5-3.5   TTR:   85.5 % (1.2 y)   INR used for dosin.90 (3/11/2020)   Warfarin maintenance plan:   7.5 mg every Wed; 5 mg all other days   Weekly warfarin total:   37.5 mg   No change documented:   Regis Chu RPH   Plan last modified:   Layla Ortega RPH (2019)   Next INR check:   3/25/2020   Priority:   High   Target end date:   Indefinite    Indications    H/O aortic valve replacement [Z95.2]  Mechanical heart valve present [Z95.2]             Anticoagulation Episode Summary     INR check location:   Home Draw    Preferred lab:       Send INR reminders to:    [facility]  PO[facility]    Comments:   **[facility]**      Anticoagulation Care Providers     Provider Role Specialty Phone number    Donya Gagnon MD Referring Cardiology [phone]          Clinic Interview:  No pertinent clinical findings have been reported.    INR History:  Anticoagulation Monitoring 2020 2020 3/12/2020   INR 3.50 3.60 2.90   INR Date 2020 2020 3/11/2020   INR Goal 2.5-3.5 2.5-3.5 2.5-3.5   Trend Same Same Same   Last Week Total 37.5 mg 37.5 mg 37.5 mg   Next Week Total 37.5 mg 37.5 mg 37.5 mg   Sun 5 mg 5 mg 5 mg   Mon 5 mg 5 mg 5 mg   Tue 5 mg 5 mg 5 mg   Wed 7.5 mg 7.5 mg 7.5 mg   Thu 5 mg 5 mg 5 mg   Fri 5 mg 5 mg 5 mg   Sat 5 mg 5 mg 5 mg   Visit Report - - -   Some recent data might be hidden       Plan:  1. INR is therapeutic today- see above in Anticoagulation Summary.    Bobby L Tapley to continue their warfarin regimen- see above in Anticoagulation Summary.  2. Follow up in 2 weeks  3. Pt has agreed to only be called if INR out of range. They have been instructed to call if any changes in medications, doses, concerns, etc. Patient expresses understanding and has no further questions at this time.    Regis Chu RPH

## 2020-03-25 ENCOUNTER — ANTICOAGULATION VISIT (OUTPATIENT)
Dept: PHARMACY | Facility: HOSPITAL | Age: 80
End: 2020-03-25

## 2020-03-25 DIAGNOSIS — Z95.2 MECHANICAL HEART VALVE PRESENT: ICD-10-CM

## 2020-03-25 DIAGNOSIS — Z95.2 H/O AORTIC VALVE REPLACEMENT: ICD-10-CM

## 2020-03-25 LAB — INR PPP: 3.4

## 2020-03-25 NOTE — PROGRESS NOTES
Anticoagulation Clinic Progress Note    Anticoagulation Summary  As of 3/25/2020    INR goal:   2.5-3.5   TTR:   85.9 % (1.3 y)   INR used for dosing:   3.40 (3/25/2020)   Warfarin maintenance plan:   7.5 mg every Wed; 5 mg all other days   Weekly warfarin total:   37.5 mg   No change documented:   Kayleen Rodriguez   Plan last modified:   Layla Ortega, [facility] (11/12/2019)   Next INR check:   4/8/2020   Priority:   High   Target end date:   Indefinite    Indications    H/O aortic valve replacement [Z95.2]  Mechanical heart valve present [Z95.2]             Anticoagulation Episode Summary     INR check location:   Home Draw    Preferred lab:       Send INR reminders to:    [facility]  POOL    Comments:   **[facility]**      Anticoagulation Care Providers     Provider Role Specialty Phone number    Donya Gagnon MD Referring Cardiology [phone]          Clinic Interview:  No pertinent clinical findings have been reported.    INR History:  Anticoagulation Monitoring 2/26/2020 3/12/2020 3/25/2020   INR 3.60 2.90 3.40   INR Date 2/26/2020 3/11/2020 3/25/2020   INR Goal 2.5-3.5 2.5-3.5 2.5-3.5   Trend Same Same Same   Last Week Total 37.5 mg 37.5 mg 37.5 mg   Next Week Total 37.5 mg 37.5 mg 37.5 mg   Sun 5 mg 5 mg 5 mg   Mon 5 mg 5 mg 5 mg   Tue 5 mg 5 mg 5 mg   Wed 7.5 mg 7.5 mg 7.5 mg   Thu 5 mg 5 mg 5 mg   Fri 5 mg 5 mg 5 mg   Sat 5 mg 5 mg 5 mg   Visit Report - - -   Some recent data might be hidden       Plan:  1. INR is therapeutic today- see above in Anticoagulation Summary.    Bobby L Tapley to continue their warfarin regimen- see above in Anticoagulation Summary.  2. Follow up in 2 weeks  3. Pt has agreed to only be called if INR out of range. They have been instructed to call if any changes in medications, doses, concerns, etc. Patient expresses understanding and has no further questions at this time.    Kayleen Rodriguez

## 2020-04-08 ENCOUNTER — ANTICOAGULATION VISIT (OUTPATIENT)
Dept: PHARMACY | Facility: HOSPITAL | Age: 80
End: 2020-04-08

## 2020-04-08 DIAGNOSIS — Z95.2 H/O AORTIC VALVE REPLACEMENT: ICD-10-CM

## 2020-04-08 DIAGNOSIS — Z95.2 MECHANICAL HEART VALVE PRESENT: ICD-10-CM

## 2020-04-08 LAB — INR PPP: 3.4

## 2020-04-08 NOTE — PROGRESS NOTES
Anticoagulation Clinic Progress Note    Anticoagulation Summary  As of 4/8/2020    INR goal:   2.5-3.5   TTR:   86.3 % (1.3 y)   INR used for dosing:   3.40 (4/8/2020)   Warfarin maintenance plan:   7.5 mg every Wed; 5 mg all other days   Weekly warfarin total:   37.5 mg   No change documented:   Brad Anderson RPH   Plan last modified:   Layla Ortega RPH (11/12/2019)   Next INR check:   4/22/2020   Priority:   High   Target end date:   Indefinite    Indications    H/O aortic valve replacement [Z95.2]  Mechanical heart valve present [Z95.2]             Anticoagulation Episode Summary     INR check location:   Home Draw    Preferred lab:       Send INR reminders to:   [facility]    Comments:   **[facility]**      Anticoagulation Care Providers     Provider Role Specialty Phone number    Donya Gagnon MD Referring Cardiology [phone]          Clinic Interview:      INR History:  Anticoagulation Monitoring 3/12/2020 3/25/2020 4/8/2020   INR 2.90 3.40 3.40   INR Date 3/11/2020 3/25/2020 4/8/2020   INR Goal 2.5-3.5 2.5-3.5 2.5-3.5   Trend Same Same Same   Last Week Total 37.5 mg 37.5 mg 37.5 mg   Next Week Total 37.5 mg 37.5 mg 37.5 mg   Sun 5 mg 5 mg 5 mg   Mon 5 mg 5 mg 5 mg   Tue 5 mg 5 mg 5 mg   Wed 7.5 mg 7.5 mg 7.5 mg   Thu 5 mg 5 mg 5 mg   Fri 5 mg 5 mg 5 mg   Sat 5 mg 5 mg 5 mg   Visit Report - - -   Some recent data might be hidden       Plan:  1. INR is Therapeutic today- see above in Anticoagulation Summary.   Will instruct Bobby L Tapley to Continue their warfarin regimen- see above in Anticoagulation Summary.  2. Follow up in 2 weeks  3. Pt has agreed to only be called if INR out of range. They have been instructed to call if any changes in medications, doses, concerns, etc. Patient expresses understanding and has no further questions at this time.    Brad Anderson RPH

## 2020-04-22 ENCOUNTER — ANTICOAGULATION VISIT (OUTPATIENT)
Dept: PHARMACY | Facility: HOSPITAL | Age: 80
End: 2020-04-22

## 2020-04-22 DIAGNOSIS — Z95.2 MECHANICAL HEART VALVE PRESENT: ICD-10-CM

## 2020-04-22 DIAGNOSIS — Z95.2 H/O AORTIC VALVE REPLACEMENT: ICD-10-CM

## 2020-04-22 LAB — INR PPP: 3

## 2020-04-22 NOTE — PROGRESS NOTES
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]             Anticoagulation Episode Summary     INR check location:   Home Draw    Preferred lab:       Send INR reminders to:   [facility]    Comments:   **[facility]**      Anticoagulation Care Providers     Provider Role Specialty Phone number    Donya Gagnon MD Referring Cardiology [phone]          Clinic Interview:      INR History:  Anticoagulation Monitoring 3/25/2020 4/8/2020 4/22/2020   INR 3.40 3.40 3.00   INR Date 3/25/2020 4/8/2020 4/22/2020   INR Goal 2.5-3.5 2.5-3.5 2.5-3.5   Trend Same Same Same   Last Week Total 37.5 mg 37.5 mg 37.5 mg   Next Week Total 37.5 mg 37.5 mg 37.5 mg   Sun 5 mg 5 mg 5 mg   Mon 5 mg 5 mg 5 mg   Tue 5 mg 5 mg 5 mg   Wed 7.5 mg 7.5 mg 7.5 mg   Thu 5 mg 5 mg 5 mg   Fri 5 mg 5 mg 5 mg   Sat 5 mg 5 mg 5 mg   Visit Report - - -   Some recent data might be hidden       Plan:  1. INR is Therapeutic today- see above in Anticoagulation Summary.   Will instruct Bobby L Tapley to Continue their warfarin regimen- see above in Anticoagulation Summary.  2. Follow up in 2 weeks  3. Pt has agreed to only be called if INR out of range. They have been instructed to call if any changes in medications, doses, concerns, etc. Patient expresses understanding and has no further questions at this time.    Brad Anderson RPH

## 2020-05-06 ENCOUNTER — ANTICOAGULATION VISIT (OUTPATIENT)
Dept: PHARMACY | Facility: HOSPITAL | Age: 80
End: 2020-05-06

## 2020-05-06 DIAGNOSIS — Z95.2 H/O AORTIC VALVE REPLACEMENT: ICD-10-CM

## 2020-05-06 DIAGNOSIS — Z95.2 MECHANICAL HEART VALVE PRESENT: ICD-10-CM

## 2020-05-06 LAB — INR PPP: 2.5

## 2020-05-06 NOTE — PROGRESS NOTES
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    INR History:  Anticoagulation Monitoring 2020   INR 3.40 3.00 2.50   INR Date 2020   INR Goal 2.5-3.5 2.5-3.5 2.5-3.5   Trend Same Same Same   Last Week Total 37.5 mg 37.5 mg 37.5 mg   Next Week Total 37.5 mg 37.5 mg 37.5 mg   Sun 5 mg 5 mg 5 mg   Mon 5 mg 5 mg 5 mg   Tue 5 mg 5 mg 5 mg   Wed 7.5 mg 7.5 mg 7.5 mg   Thu 5 mg 5 mg 5 mg   Fri 5 mg 5 mg 5 mg   Sat 5 mg 5 mg 5 mg   Visit Report - - -   Some recent data might be hidden       Plan:  1. INR is therapeutic today- see above in Anticoagulation Summary.    Bobby L Tapley to continue their warfarin regimen- see above in Anticoagulation Summary.  2. Follow up in 2 weeks  3. Pt has agreed to only be called if INR out of range. They have been instructed to call if any changes in medications, doses, concerns, etc. Patient expresses understanding and has no further questions at this time.    Laurie Harvey

## 2020-05-18 ENCOUNTER — ANTICOAGULATION VISIT (OUTPATIENT)
Dept: PHARMACY | Facility: HOSPITAL | Age: 80
End: 2020-05-18

## 2020-05-18 DIAGNOSIS — Z95.2 MECHANICAL HEART VALVE PRESENT: ICD-10-CM

## 2020-05-18 DIAGNOSIS — Z95.2 H/O AORTIC VALVE REPLACEMENT: ICD-10-CM

## 2020-05-18 LAB — INR PPP: 3.1

## 2020-05-18 NOTE — PROGRESS NOTES
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    INR History:  Anticoagulation Monitoring 4/22/2020 5/6/2020 5/18/2020   INR 3.00 2.50 3.10   INR Date 4/22/2020 5/6/2020 5/15/2020   INR Goal 2.5-3.5 2.5-3.5 2.5-3.5   Trend Same Same Same   Last Week Total 37.5 mg 37.5 mg 37.5 mg   Next Week Total 37.5 mg 37.5 mg 37.5 mg   Sun 5 mg 5 mg 5 mg   Mon 5 mg 5 mg 5 mg   Tue 5 mg 5 mg 5 mg   Wed 7.5 mg 7.5 mg 7.5 mg   Thu 5 mg 5 mg 5 mg   Fri 5 mg 5 mg 5 mg   Sat 5 mg 5 mg 5 mg   Visit Report - - -   Some recent data might be hidden       Plan:  1. INR is therapeutic today- see above in Anticoagulation Summary.    Bobby L Tapley to continue their warfarin regimen- see above in Anticoagulation Summary.  2. Follow up in 2 weeks  3. Pt has agreed to only be called if INR out of range. They have been instructed to call if any changes in medications, doses, concerns, etc. Patient expresses understanding and has no further questions at this time.    Laurie Harvey

## 2020-05-20 ENCOUNTER — ANTICOAGULATION VISIT (OUTPATIENT)
Dept: PHARMACY | Facility: HOSPITAL | Age: 80
End: 2020-05-20

## 2020-05-20 DIAGNOSIS — Z95.2 MECHANICAL HEART VALVE PRESENT: ICD-10-CM

## 2020-05-20 DIAGNOSIS — Z95.2 H/O AORTIC VALVE REPLACEMENT: ICD-10-CM

## 2020-05-20 LAB — INR PPP: 3.3

## 2020-05-20 NOTE — PROGRESS NOTES
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    INR History:  Anticoagulation Monitoring 5/6/2020 5/18/2020 5/20/2020   INR 2.50 3.10 3.30   INR Date 5/6/2020 5/15/2020 5/20/2020   INR Goal 2.5-3.5 2.5-3.5 2.5-3.5   Trend Same Same Same   Last Week Total 37.5 mg 37.5 mg 37.5 mg   Next Week Total 37.5 mg 37.5 mg 37.5 mg   Sun 5 mg 5 mg 5 mg   Mon 5 mg 5 mg 5 mg   Tue 5 mg 5 mg 5 mg   Wed 7.5 mg 7.5 mg 7.5 mg   Thu 5 mg 5 mg 5 mg   Fri 5 mg 5 mg 5 mg   Sat 5 mg 5 mg 5 mg   Visit Report - - -   Some recent data might be hidden       Plan:  1. INR is therapeutic today- see above in Anticoagulation Summary.    Bobby L Tapley to continue their warfarin regimen- see above in Anticoagulation Summary.  2. Follow up in 2 weeks  3. Pt has agreed to only be called if INR out of range. They have been instructed to call if any changes in medications, doses, concerns, etc. Patient expresses understanding and has no further questions at this time.    Laurie Harvey

## 2020-06-03 ENCOUNTER — ANTICOAGULATION VISIT (OUTPATIENT)
Dept: PHARMACY | Facility: HOSPITAL | Age: 80
End: 2020-06-03

## 2020-06-03 DIAGNOSIS — Z95.2 MECHANICAL HEART VALVE PRESENT: ICD-10-CM

## 2020-06-03 DIAGNOSIS — Z95.2 H/O AORTIC VALVE REPLACEMENT: ICD-10-CM

## 2020-06-03 LAB — INR PPP: 3

## 2020-06-03 NOTE — PROGRESS NOTES
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    INR History:  Anticoagulation Monitoring 5/18/2020 5/20/2020 6/3/2020   INR 3.10 3.30 3.00   INR Date 5/15/2020 5/20/2020 6/3/2020   INR Goal 2.5-3.5 2.5-3.5 2.5-3.5   Trend Same Same Same   Last Week Total 37.5 mg 37.5 mg 37.5 mg   Next Week Total 37.5 mg 37.5 mg 37.5 mg   Sun 5 mg 5 mg 5 mg   Mon 5 mg 5 mg 5 mg   Tue 5 mg 5 mg 5 mg   Wed 7.5 mg 7.5 mg 7.5 mg   Thu 5 mg 5 mg 5 mg   Fri 5 mg 5 mg 5 mg   Sat 5 mg 5 mg 5 mg   Visit Report - - -   Some recent data might be hidden       Plan:  1. INR is therapeutic today- see above in Anticoagulation Summary.    Bobby L Tapley to continue their warfarin regimen- see above in Anticoagulation Summary.  2. Follow up in 2 weeks  3. Pt has agreed to only be called if INR out of range. They have been instructed to call if any changes in medications, doses, concerns, etc. Patient expresses understanding and has no further questions at this time.    Kayleen Rodriguez

## 2020-06-17 ENCOUNTER — ANTICOAGULATION VISIT (OUTPATIENT)
Dept: PHARMACY | Facility: HOSPITAL | Age: 80
End: 2020-06-17

## 2020-06-17 DIAGNOSIS — Z95.2 H/O AORTIC VALVE REPLACEMENT: ICD-10-CM

## 2020-06-17 DIAGNOSIS — Z95.2 MECHANICAL HEART VALVE PRESENT: ICD-10-CM

## 2020-06-17 LAB — INR PPP: 3.1

## 2020-06-17 NOTE — PROGRESS NOTES
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    INR History:  Anticoagulation Monitoring 5/20/2020 6/3/2020 6/17/2020   INR 3.30 3.00 3.10   INR Date 5/20/2020 6/3/2020 6/17/2020   INR Goal 2.5-3.5 2.5-3.5 2.5-3.5   Trend Same Same Same   Last Week Total 37.5 mg 37.5 mg 37.5 mg   Next Week Total 37.5 mg 37.5 mg 37.5 mg   Sun 5 mg 5 mg 5 mg   Mon 5 mg 5 mg 5 mg   Tue 5 mg 5 mg 5 mg   Wed 7.5 mg 7.5 mg 7.5 mg   Thu 5 mg 5 mg 5 mg   Fri 5 mg 5 mg 5 mg   Sat 5 mg 5 mg 5 mg   Visit Report - - -   Some recent data might be hidden       Plan:  1. INR is therapeutic today- see above in Anticoagulation Summary.    Bobby L Tapley to continue their warfarin regimen- see above in Anticoagulation Summary.  2. Follow up in 2 weeks  3. Pt has agreed to only be called if INR out of range. They have been instructed to call if any changes in medications, doses, concerns, etc. Patient expresses understanding and has no further questions at this time.    Laurie Harvey

## 2020-07-01 ENCOUNTER — ANTICOAGULATION VISIT (OUTPATIENT)
Dept: PHARMACY | Facility: HOSPITAL | Age: 80
End: 2020-07-01

## 2020-07-01 DIAGNOSIS — Z95.2 MECHANICAL HEART VALVE PRESENT: ICD-10-CM

## 2020-07-01 DIAGNOSIS — Z95.2 H/O AORTIC VALVE REPLACEMENT: ICD-10-CM

## 2020-07-01 LAB — INR PPP: 3.1

## 2020-07-01 NOTE — PROGRESS NOTES
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    INR History:  Anticoagulation Monitoring 6/3/2020 6/17/2020 7/1/2020   INR 3.00 3.10 3.10   INR Date 6/3/2020 6/17/2020 7/1/2020   INR Goal 2.5-3.5 2.5-3.5 2.5-3.5   Trend Same Same Same   Last Week Total 37.5 mg 37.5 mg 37.5 mg   Next Week Total 37.5 mg 37.5 mg 37.5 mg   Sun 5 mg 5 mg 5 mg   Mon 5 mg 5 mg 5 mg   Tue 5 mg 5 mg 5 mg   Wed 7.5 mg 7.5 mg 7.5 mg   Thu 5 mg 5 mg 5 mg   Fri 5 mg 5 mg 5 mg   Sat 5 mg 5 mg 5 mg   Visit Report - - -   Some recent data might be hidden       Plan:  1. INR is therapeutic today- see above in Anticoagulation Summary.    Bobby L Tapley to continue their warfarin regimen- see above in Anticoagulation Summary.  2. Follow up in 2 weeks  3. Pt has agreed to only be called if INR out of range. They have been instructed to call if any changes in medications, doses, concerns, etc. Patient expresses understanding and has no further questions at this time.    Kayleen Rodriguez

## 2020-07-15 ENCOUNTER — ANTICOAGULATION VISIT (OUTPATIENT)
Dept: PHARMACY | Facility: HOSPITAL | Age: 80
End: 2020-07-15

## 2020-07-15 DIAGNOSIS — Z95.2 H/O AORTIC VALVE REPLACEMENT: ICD-10-CM

## 2020-07-15 DIAGNOSIS — Z95.2 MECHANICAL HEART VALVE PRESENT: ICD-10-CM

## 2020-07-15 LAB — INR PPP: 3

## 2020-07-15 NOTE — PROGRESS NOTES
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    INR History:  Anticoagulation Monitoring 6/17/2020 7/1/2020 7/15/2020   INR 3.10 3.10 3.00   INR Date 6/17/2020 7/1/2020 7/15/2020   INR Goal 2.5-3.5 2.5-3.5 2.5-3.5   Trend Same Same Same   Last Week Total 37.5 mg 37.5 mg 37.5 mg   Next Week Total 37.5 mg 37.5 mg 37.5 mg   Sun 5 mg 5 mg 5 mg   Mon 5 mg 5 mg 5 mg   Tue 5 mg 5 mg 5 mg   Wed 7.5 mg 7.5 mg 7.5 mg   Thu 5 mg 5 mg 5 mg   Fri 5 mg 5 mg 5 mg   Sat 5 mg 5 mg 5 mg   Visit Report - - -   Some recent data might be hidden       Plan:  1. INR is therapeutic today- see above in Anticoagulation Summary.    Bobby L Tapley to continue their warfarin regimen- see above in Anticoagulation Summary.  2. Follow up in 2 weeks  3. Pt has agreed to only be called if INR out of range. They have been instructed to call if any changes in medications, doses, concerns, etc. Patient expresses understanding and has no further questions at this time.    Laurie Harvey

## 2020-07-29 ENCOUNTER — ANTICOAGULATION VISIT (OUTPATIENT)
Dept: PHARMACY | Facility: HOSPITAL | Age: 80
End: 2020-07-29

## 2020-07-29 DIAGNOSIS — Z95.2 MECHANICAL HEART VALVE PRESENT: ICD-10-CM

## 2020-07-29 DIAGNOSIS — Z95.2 H/O AORTIC VALVE REPLACEMENT: ICD-10-CM

## 2020-07-29 LAB — INR PPP: 3.1

## 2020-07-29 NOTE — PROGRESS NOTES
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    INR History:  Anticoagulation Monitoring 7/1/2020 7/15/2020 7/29/2020   INR 3.10 3.00 3.10   INR Date 7/1/2020 7/15/2020 7/29/2020   INR Goal 2.5-3.5 2.5-3.5 2.5-3.5   Trend Same Same Same   Last Week Total 37.5 mg 37.5 mg 37.5 mg   Next Week Total 37.5 mg 37.5 mg 37.5 mg   Sun 5 mg 5 mg 5 mg   Mon 5 mg 5 mg 5 mg   Tue 5 mg 5 mg 5 mg   Wed 7.5 mg 7.5 mg 7.5 mg   Thu 5 mg 5 mg 5 mg   Fri 5 mg 5 mg 5 mg   Sat 5 mg 5 mg 5 mg   Visit Report - - -   Some recent data might be hidden       Plan:  1. INR is therapeutic today- see above in Anticoagulation Summary.    Bobby L Tapley to continue their warfarin regimen- see above in Anticoagulation Summary.  2. Follow up in 2 weeks  3. Pt has agreed to only be called if INR out of range. They have been instructed to call if any changes in medications, doses, concerns, etc. Patient expresses understanding and has no further questions at this time.    Laurie Harvey

## 2020-08-12 LAB — INR PPP: 3

## 2020-08-13 ENCOUNTER — ANTICOAGULATION VISIT (OUTPATIENT)
Dept: PHARMACY | Facility: HOSPITAL | Age: 80
End: 2020-08-13

## 2020-08-13 DIAGNOSIS — Z95.2 MECHANICAL HEART VALVE PRESENT: ICD-10-CM

## 2020-08-13 DIAGNOSIS — Z95.2 H/O AORTIC VALVE REPLACEMENT: ICD-10-CM

## 2020-08-13 NOTE — PROGRESS NOTES
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    INR History:  Anticoagulation Monitoring 7/15/2020 7/29/2020 8/13/2020   INR 3.00 3.10 3.00   INR Date 7/15/2020 7/29/2020 8/12/2020   INR Goal 2.5-3.5 2.5-3.5 2.5-3.5   Trend Same Same Same   Last Week Total 37.5 mg 37.5 mg 37.5 mg   Next Week Total 37.5 mg 37.5 mg 37.5 mg   Sun 5 mg 5 mg 5 mg   Mon 5 mg 5 mg 5 mg   Tue 5 mg 5 mg 5 mg   Wed 7.5 mg 7.5 mg 7.5 mg   Thu 5 mg 5 mg 5 mg   Fri 5 mg 5 mg 5 mg   Sat 5 mg 5 mg 5 mg   Visit Report - - -   Some recent data might be hidden       Plan:  1. INR is therapeutic today- see above in Anticoagulation Summary.    Bobby L Tapley to continue their warfarin regimen- see above in Anticoagulation Summary.  2. Follow up in 2 weeks  3. Pt has agreed to only be called if INR out of range. They have been instructed to call if any changes in medications, doses, concerns, etc. Patient expresses understanding and has no further questions at this time.    Amparo Echeverria

## 2020-08-25 ENCOUNTER — OFFICE VISIT (OUTPATIENT)
Dept: CARDIOLOGY | Facility: CLINIC | Age: 80
End: 2020-08-25

## 2020-08-25 VITALS
DIASTOLIC BLOOD PRESSURE: 60 MMHG | HEIGHT: 71 IN | BODY MASS INDEX: 22.82 KG/M2 | SYSTOLIC BLOOD PRESSURE: 120 MMHG | WEIGHT: 163 LBS | HEART RATE: 60 BPM

## 2020-08-25 DIAGNOSIS — I25.10 CORONARY ARTERY DISEASE INVOLVING NATIVE CORONARY ARTERY OF NATIVE HEART WITHOUT ANGINA PECTORIS: ICD-10-CM

## 2020-08-25 DIAGNOSIS — E78.2 MIXED HYPERLIPIDEMIA: ICD-10-CM

## 2020-08-25 DIAGNOSIS — Z95.2 MECHANICAL HEART VALVE PRESENT: ICD-10-CM

## 2020-08-25 DIAGNOSIS — Z99.89 OSA ON CPAP: ICD-10-CM

## 2020-08-25 DIAGNOSIS — G47.33 OSA ON CPAP: ICD-10-CM

## 2020-08-25 DIAGNOSIS — I71.22 AORTIC ARCH ANEURYSM (HCC): ICD-10-CM

## 2020-08-25 DIAGNOSIS — I10 ESSENTIAL HYPERTENSION: Primary | ICD-10-CM

## 2020-08-25 PROCEDURE — 93000 ELECTROCARDIOGRAM COMPLETE: CPT | Performed by: INTERNAL MEDICINE

## 2020-08-25 PROCEDURE — 99214 OFFICE O/P EST MOD 30 MIN: CPT | Performed by: INTERNAL MEDICINE

## 2020-08-25 RX ORDER — PREDNISONE 1 MG/1
5 TABLET ORAL DAILY
COMMUNITY

## 2020-08-25 NOTE — PROGRESS NOTES
Dr. Gagnon,   I spoke with 's office. He has a recall in October, central scheduling will contact him to schedule CTA, and once that is complete the office will contact him and make an appointment. Pt is aware of this process. I also scheduled pt for a 1 year follow-up with TK.   Thanks,  Berto

## 2020-08-25 NOTE — PROGRESS NOTES
Date of Office Visit: 2020  Encounter Provider: Donya Gagnon MD  Place of Service: [facility] CARDIOLOGY  Patient Name: Bobby L Tapley  :1940      Patient ID:  Bobby L Tapley is a 80 y.o. male is here for  followup for CAD, status post CABG, mechanical aortic valve.        History of Present Illness     In  he underwent replacement of his aortic valve, getting a mechanical aortic valve.   He also received saphenous vein graft to LAD, replacement of the ascending and transverse  aortic arch with reimplantation of the coronary arteries onto the arch.  His operative report from [facility] dated 2004.  they found intraoperatively was 50-60% mid LAD stenosis, moderately severe aortic valve  stenosis, calcified bileaflet aortic valve and a 6 cm ascending aortic aneurysm as well as  dilation of the transverse thoracic aorta. He underwent a Bentall procedure and received  a #23 Carbomedics mechanical aortic valve with conduit. He had 30 mm woven Hemashield gold graft  used to replace the transverse thoracic aorta. The internal mammary artery was not used  because it was small on inspection so he received a saphenous vein graft to the LAD. This  procedure was done by Dr. Santana Walls.          He had an echocardiogram which was done in 2013 which revealed a normally functioning  mechanical aortic valve. His ejection fraction was 60% with normal LV size and no LVH.   He had a cardiac catheterization which was done in 2012 which revealed a patent LAD,  patent left main, patent circumflex vessel, patent RCA and occluded saphenous vein graft  to LAD. stress nuclear perfusion study which was performed in 2012 prior  to the catheterization which showed a small amount of inferior wall ischemia but no ST  changes on that.       He had a 2-D echocardiogram with Doppler done in 02/10/2015, showing an ejection fraction  of 65%, septal hypokinesis, mechanical type  prosthetic valve, which was normal and trace  tricuspid insufficiency.     He had a CTA of the chest done 10/2017 showing a 47 mm aortic root with a type B dissection terminating proximal to the origin the brachiocephalic artery, 44 mm ascending aorta, 43 mm proximal and 32 mm distal aortic arch.  There is evidence of chronic emphysema, hepatic steatosis and a prosthetic aortic valve.  He had an echo done October 2017 showing ejection fraction 62%, mechanical prosthetic aortic valve which was normal, trivial insufficiency, trace mitral tricuspid insufficiency, aortic root dilation.     He has obstructive sleep apnea and is using CPAP.  He was set up for a cardiac catheterization after an abnormal stress study done 10/11/19 showing a small apical infarct.  When he arrived on the day of cardiac catheterization, his INR was high.  In addition his symptoms were somewhat atypical and so the case was canceled.  He had an echocardiogram done 10/11/2019 showing ejection fraction 62% with a 23 mm CarboMedics mechanical valve that was within defined limits, mild mitral insufficiency and mild dilation of the ascending aorta.     Labs in 5/27/2020 show normal CMP, HDL 44, , normal CBC.  He follows with Dr. Calhoun for his aortic root dilation.  His CTA chest done 11/2019 showed Mildly dilated aortic arch distal to graft at 4 cm.  The aortic root was measuring 4.1 which is unchanged.  Really there were no changes when compared to the CT from October 2018.  He sees Dr. Walker for his rheumatoid arthritis and is on prednisone for this.  He does have some mild dyspnea which is exertional and he thinks mostly due to his weight gain and his abdomen do the prednisone.  He has had no dizziness or syncope.  He has had some cognition issues.  He has had no tachycardia.  He has no orthopnea or PND.  He has no exertional chest pain.    Past Medical History:   Diagnosis Date   • Aneurysm of aortic arch (CMS/HCC)    • Aortic  insufficiency    • Aortic stenosis    • Arthritis    • Ascending aortic aneurysm (CMS/HCC)    • CAD (coronary artery disease)    • Enlarged prostate    • H/O aortic valve replacement    • H/O echocardiogram 02/10/2015   • Health care maintenance    • History of EKG 07/07/2015   • Hyperlipidemia    • Hypertension    • Kidney stones    • JOY on CPAP    • Precordial pain    • Shortness of breath          Past Surgical History:   Procedure Laterality Date   • AORTIC VALVE REPAIR/REPLACEMENT     • CARDIAC CATHETERIZATION  11/27/2013   • COLONOSCOPY N/A 8/15/2017    Procedure: COLONOSCOPY;  Surgeon: Hu Bergman MD;  Location: [facility] ENDOSCOPY;  Service:    • CORONARY ARTERY BYPASS GRAFT     • ROTATOR CUFF REPAIR         Current Outpatient Medications on File Prior to Visit   Medication Sig Dispense Refill   • aspirin 81 MG tablet Take 81 mg by mouth Daily.     • Calcium Carbonate-Vit D-Min (CALTRATE 600+D PLUS MINERALS) 600-800 MG-UNIT tablet Take 1 tablet by mouth Daily.     • docusate sodium (COLACE) 250 MG capsule Take 250 mg by mouth Daily.     • enalapril (VASOTEC) 2.5 MG tablet Take 2.5 mg by mouth Daily.     • finasteride (PROSCAR) 5 MG tablet Take 5 mg by mouth Daily.     • HYDROcodone-acetaminophen (NORCO) 5-325 MG per tablet Take 1 tablet by mouth as needed.     • L-Lysine 500 MG capsule Take 1 capsule by mouth As Needed.     • metoprolol succinate XL (TOPROL-XL) 50 MG 24 hr tablet Take 1 tablet by mouth Every Night. 90 tablet 3   • Multiple Vitamins-Minerals (CENTRUM SILVER) tablet Take 1 tablet by mouth Daily.     • omeprazole (priLOSEC) 20 MG capsule Take 20 mg by mouth Daily.     • predniSONE (DELTASONE) 5 MG tablet Take 5 mg by mouth Daily.     • vitamin C (ASCORBIC ACID) 500 MG tablet Take 1,000 mg by mouth Daily.     • warfarin (COUMADIN) 5 MG tablet Take 1.5 tablets (7.5 mg) by mouth on Mon, Wed, and Fri. Take 1 tablet (5 mg) on all other days or as directed by [facility] 120 tablet 2     No  "current facility-administered medications on file prior to visit.        Social History     Socioeconomic History   • Marital status:      Spouse name: Not on file   • Number of children: Not on file   • Years of education: Not on file   • Highest education level: Not on file   Occupational History   • Occupation: retired   Tobacco Use   • Smoking status: Never Smoker   • Smokeless tobacco: Never Used   Substance and Sexual Activity   • Alcohol use: No     Comment: No caffeine use   • Drug use: No   • Sexual activity: Defer           Review of Systems   Constitution: Negative.   HENT: Negative for congestion.    Eyes: Negative for vision loss in left eye and vision loss in right eye.   Respiratory: Positive for shortness of breath. Negative for cough, hemoptysis, sleep disturbances due to breathing, snoring, sputum production and wheezing.    Endocrine: Negative.    Hematologic/Lymphatic: Negative.    Skin: Negative for poor wound healing and rash.   Musculoskeletal: Negative for falls, gout, muscle cramps and myalgias.   Gastrointestinal: Negative for abdominal pain, diarrhea, dysphagia, hematemesis, melena, nausea and vomiting.   Neurological: Negative for excessive daytime sleepiness, dizziness, headaches, light-headedness, loss of balance, seizures and vertigo.   Psychiatric/Behavioral: Negative for depression and substance abuse. The patient is not nervous/anxious.        Procedures    ECG 12 Lead  Date/Time: 8/25/2020 9:10 AM  Performed by: Donya Gagnon MD  Authorized by: Donya Gagnon MD   Comparison: compared with previous ECG   Similar to previous ECG  Rhythm: sinus rhythm  Conduction: left anterior fascicular block    Clinical impression: abnormal EKG                Objective:      Vitals:    08/25/20 0855   BP: 120/60   BP Location: Left arm   Patient Position: Sitting   Pulse: 60   Weight: 73.9 kg (163 lb)   Height: 180.3 cm (71\")     Body mass index is 22.73 kg/m².    Physical " Exam   Constitutional: He is oriented to person, place, and time. He appears well-developed and well-nourished. No distress.   HENT:   Head: Normocephalic and atraumatic.   Eyes: Conjunctivae are normal. No scleral icterus.   Neck: Neck supple. No JVD present. Carotid bruit is not present. No thyromegaly present.   Cardiovascular: Normal rate, regular rhythm, S1 normal, S2 normal and intact distal pulses.  No extrasystoles are present. PMI is not displaced. Exam reveals no gallop.   No murmur heard.  Pulses:       Carotid pulses are 2+ on the right side, and 2+ on the left side.       Radial pulses are 2+ on the right side, and 2+ on the left side.        Dorsalis pedis pulses are 2+ on the right side, and 2+ on the left side.        Posterior tibial pulses are 2+ on the right side, and 2+ on the left side.   Pulmonary/Chest: Effort normal and breath sounds normal. No respiratory distress. He has no wheezes. He has no rhonchi. He has no rales. He exhibits no tenderness.   Abdominal: Soft. Bowel sounds are normal. He exhibits no distension, no abdominal bruit and no mass. There is no tenderness.   Musculoskeletal: He exhibits no edema or deformity.   Lymphadenopathy:     He has no cervical adenopathy.   Neurological: He is alert and oriented to person, place, and time. No cranial nerve deficit.   Skin: Skin is warm and dry. No rash noted. He is not diaphoretic. No cyanosis. No pallor. Nails show no clubbing.   Psychiatric: He has a normal mood and affect. Judgment normal.   Vitals reviewed.      Lab Review:       Assessment:      Diagnosis Plan   1. Essential hypertension     2. Mechanical heart valve present     3. Mixed hyperlipidemia     4. Coronary artery disease involving native coronary artery of native heart without angina pectoris     5. Aortic arch aneurysm (CMS/HCC)     6. JOY on CPAP       1. Status post mechanical aortic valve replacement for a bicuspid aortic valve with severe  stenosis and large  ascending aortic aneurysm. This was done in 02/2004. He received a 23  mm Carbomedics mechanical aortic valve with a conduit. His thoracic aorta was repaired  with 30 mm woven Hemashield gold graft and his ascending aortic aneurysm was also  repaired. He received a saphenous vein graft to LAD at that time. He then had a cardiac  catheterization in 2012 which revealed the saphenous vein graft to the LAD was occluded  but his coronary arteries were all patent. At this time he has no active angina.  2. Normal ejection fraction   3. Hypertension under fair control.   4. Hyperlipidemia. The patient is on no statin therapy for this. His lipids are well controlled.   5. Renal calculi; stable.   6. JOY, on CPAP.      Plan:       No changes, see Ro in 1 year.  We will send a message to get follow-up with Dr. Cox.

## 2020-08-27 ENCOUNTER — ANTICOAGULATION VISIT (OUTPATIENT)
Dept: PHARMACY | Facility: HOSPITAL | Age: 80
End: 2020-08-27

## 2020-08-27 DIAGNOSIS — Z95.2 H/O AORTIC VALVE REPLACEMENT: ICD-10-CM

## 2020-08-27 DIAGNOSIS — Z95.2 MECHANICAL HEART VALVE PRESENT: ICD-10-CM

## 2020-08-27 LAB — INR PPP: 2.9

## 2020-08-27 NOTE — PROGRESS NOTES
Anticoagulation Clinic Progress Note    Anticoagulation Summary  As of 2020    INR goal:   2.5-3.5   TTR:   89.4 % (1.7 y)   INR used for dosin.90 (2020)   Warfarin maintenance plan:   7.5 mg every Wed; 5 mg all other days   Weekly warfarin total:   37.5 mg   No change documented:   Laurie Harvey   Plan last modified:   Layla Ortega, [facility] (2019)   Next INR check:   2020   Priority:   High   Target end date:   Indefinite    Indications    H/O aortic valve replacement [Z95.2]  Mechanical heart valve present [Z95.2]             Anticoagulation Episode Summary     INR check location:   Home Draw    Preferred lab:       Send INR reminders to:    [facility]  [facility]    Comments:   **[facility]**      Anticoagulation Care Providers     Provider Role Specialty Phone number    Donya Gagnon MD Referring Cardiology [phone]          Clinic Interview:  No pertinent clinical findings have been reported.    INR History:  Anticoagulation Monitoring 2020   INR 3.10 3.00 2.90   INR Date 2020   INR Goal 2.5-3.5 2.5-3.5 2.5-3.5   Trend Same Same Same   Last Week Total 37.5 mg 37.5 mg 37.5 mg   Next Week Total 37.5 mg 37.5 mg 37.5 mg   Sun 5 mg 5 mg 5 mg   Mon 5 mg 5 mg 5 mg   Tue 5 mg 5 mg 5 mg   Wed 7.5 mg 7.5 mg 7.5 mg   Thu 5 mg 5 mg 5 mg   Fri 5 mg 5 mg 5 mg   Sat 5 mg 5 mg 5 mg   Visit Report - - -   Some recent data might be hidden       Plan:  1. INR is therapeutic today- see above in Anticoagulation Summary.    Bobby L Tapley to continue their warfarin regimen- see above in Anticoagulation Summary.  2. Follow up in 2 weeks  3. Pt has agreed to only be called if INR out of range. They have been instructed to call if any changes in medications, doses, concerns, etc. Patient expresses understanding and has no further questions at this time.    Laurie Harvey

## 2020-09-09 ENCOUNTER — ANTICOAGULATION VISIT (OUTPATIENT)
Dept: PHARMACY | Facility: HOSPITAL | Age: 80
End: 2020-09-09

## 2020-09-09 DIAGNOSIS — Z95.2 H/O AORTIC VALVE REPLACEMENT: ICD-10-CM

## 2020-09-09 DIAGNOSIS — Z95.2 MECHANICAL HEART VALVE PRESENT: ICD-10-CM

## 2020-09-09 LAB — INR PPP: 2.6

## 2020-09-09 NOTE — PROGRESS NOTES
Anticoagulation Clinic Progress Note    Anticoagulation Summary  As of 2020    INR goal:   2.5-3.5   TTR:   89.6 % (1.7 y)   INR used for dosin.60 (2020)   Warfarin maintenance plan:   7.5 mg every Wed; 5 mg all other days   Weekly warfarin total:   37.5 mg   No change documented:   Kayleen Rodriguez   Plan last modified:   Layla Ortega, [facility] (2019)   Next INR check:   2020   Priority:   High   Target end date:   Indefinite    Indications    H/O aortic valve replacement [Z95.2]  Mechanical heart valve present [Z95.2]             Anticoagulation Episode Summary     INR check location:   Home Draw    Preferred lab:       Send INR reminders to:    [facility]  POOL    Comments:   **[facility]**      Anticoagulation Care Providers     Provider Role Specialty Phone number    Donya Gagnon MD Referring Cardiology [phone]          Clinic Interview:  No pertinent clinical findings have been reported.    INR History:  Anticoagulation Monitoring 2020   INR 3.00 2.90 2.60   INR Date 2020   INR Goal 2.5-3.5 2.5-3.5 2.5-3.5   Trend Same Same Same   Last Week Total 37.5 mg 37.5 mg 37.5 mg   Next Week Total 37.5 mg 37.5 mg 37.5 mg   Sun 5 mg 5 mg 5 mg   Mon 5 mg 5 mg 5 mg   Tue 5 mg 5 mg 5 mg   Wed 7.5 mg 7.5 mg 7.5 mg   Thu 5 mg 5 mg 5 mg   Fri 5 mg 5 mg 5 mg   Sat 5 mg 5 mg 5 mg   Visit Report - - -   Some recent data might be hidden       Plan:  1. INR is therapeutic today- see above in Anticoagulation Summary.    Bobby L Tapley to continue their warfarin regimen- see above in Anticoagulation Summary.  2. Follow up in 2 weeks  3. Pt has agreed to only be called if INR out of range. They have been instructed to call if any changes in medications, doses, concerns, etc. Patient expresses understanding and has no further questions at this time.    Kayleen Rodriguez

## 2020-09-23 ENCOUNTER — ANTICOAGULATION VISIT (OUTPATIENT)
Dept: PHARMACY | Facility: HOSPITAL | Age: 80
End: 2020-09-23

## 2020-09-23 DIAGNOSIS — Z95.2 MECHANICAL HEART VALVE PRESENT: ICD-10-CM

## 2020-09-23 DIAGNOSIS — Z95.2 H/O AORTIC VALVE REPLACEMENT: ICD-10-CM

## 2020-09-23 LAB — INR PPP: 2.8

## 2020-09-23 RX ORDER — WARFARIN SODIUM 5 MG/1
TABLET ORAL
Qty: 105 TABLET | Refills: 1 | Status: SHIPPED | OUTPATIENT
Start: 2020-09-23 | End: 2021-09-17 | Stop reason: SDUPTHER

## 2020-09-23 NOTE — PROGRESS NOTES
Anticoagulation Clinic Progress Note    Anticoagulation Summary  As of 2020    INR goal:  2.5-3.5   TTR:  89.9 % (1.8 y)   INR used for dosin.80 (2020)   Warfarin maintenance plan:  7.5 mg every Wed; 5 mg all other days   Weekly warfarin total:  37.5 mg   No change documented:  Kayleen Rodriguez   Plan last modified:  Layla Ortega, [facility] (2019)   Next INR check:  10/7/2020   Priority:  High   Target end date:  Indefinite    Indications    H/O aortic valve replacement [Z95.2]  Mechanical heart valve present [Z95.2]             Anticoagulation Episode Summary     INR check location:  Home Draw    Preferred lab:      Send INR reminders to:   [facility]  POOL    Comments:  **[facility]**      Anticoagulation Care Providers     Provider Role Specialty Phone number    Donya Gagnon MD Referring Cardiology [phone]          Clinic Interview:  No pertinent clinical findings have been reported.    INR History:  Anticoagulation Monitoring 2020   INR 2.90 2.60 2.80   INR Date 2020   INR Goal 2.5-3.5 2.5-3.5 2.5-3.5   Trend Same Same Same   Last Week Total 37.5 mg 37.5 mg 37.5 mg   Next Week Total 37.5 mg 37.5 mg 37.5 mg   Sun 5 mg 5 mg 5 mg   Mon 5 mg 5 mg 5 mg   Tue 5 mg 5 mg 5 mg   Wed 7.5 mg 7.5 mg 7.5 mg   Thu 5 mg 5 mg 5 mg   Fri 5 mg 5 mg 5 mg   Sat 5 mg 5 mg 5 mg   Visit Report - - -   Some recent data might be hidden       Plan:  1. INR is therapeutic today- see above in Anticoagulation Summary.    Bobby L Tapley to continue their warfarin regimen- see above in Anticoagulation Summary.  2. Follow up in 2 weeks  3. Pt has agreed to only be called if INR out of range. They have been instructed to call if any changes in medications, doses, concerns, etc. Patient expresses understanding and has no further questions at this time.    Kayleen Rodriguez

## 2020-10-07 ENCOUNTER — ANTICOAGULATION VISIT (OUTPATIENT)
Dept: PHARMACY | Facility: HOSPITAL | Age: 80
End: 2020-10-07

## 2020-10-07 DIAGNOSIS — Z95.2 MECHANICAL HEART VALVE PRESENT: ICD-10-CM

## 2020-10-07 DIAGNOSIS — Z95.2 H/O AORTIC VALVE REPLACEMENT: ICD-10-CM

## 2020-10-07 LAB — INR PPP: 2.5

## 2020-10-07 NOTE — PROGRESS NOTES
Anticoagulation Clinic Progress Note    Anticoagulation Summary  As of 10/7/2020    INR goal:  2.5-3.5   TTR:  90.1 % (1.8 y)   INR used for dosin.50 (10/7/2020)   Warfarin maintenance plan:  7.5 mg every Wed; 5 mg all other days   Weekly warfarin total:  37.5 mg   No change documented:  Amparo Echeverria   Plan last modified:  Layla Ortega, [facility] (2019)   Next INR check:  10/21/2020   Priority:  High   Target end date:  Indefinite    Indications    H/O aortic valve replacement [Z95.2]  Mechanical heart valve present [Z95.2]             Anticoagulation Episode Summary     INR check location:  Home Draw    Preferred lab:      Send INR reminders to:  [facility]  POOL    Comments:  **[facility]**      Anticoagulation Care Providers     Provider Role Specialty Phone number    Donya Gagnon MD Referring Cardiology [phone]          Clinic Interview:  No pertinent clinical findings have been reported.    INR History:  Anticoagulation Monitoring 2020 2020 10/7/2020   INR 2.60 2.80 2.50   INR Date 2020 2020 10/7/2020   INR Goal 2.5-3.5 2.5-3.5 2.5-3.5   Trend Same Same Same   Last Week Total 37.5 mg 37.5 mg 37.5 mg   Next Week Total 37.5 mg 37.5 mg 37.5 mg   Sun 5 mg 5 mg 5 mg   Mon 5 mg 5 mg 5 mg   Tue 5 mg 5 mg 5 mg   Wed 7.5 mg 7.5 mg 7.5 mg   Thu 5 mg 5 mg 5 mg   Fri 5 mg 5 mg 5 mg   Sat 5 mg 5 mg 5 mg   Visit Report - - -   Some recent data might be hidden       Plan:  1. INR is therapeutic today- see above in Anticoagulation Summary.    Bobby L Tapley to continue their warfarin regimen- see above in Anticoagulation Summary.  2. Follow up in 2 weeks  3. Pt has agreed to only be called if INR out of range. They have been instructed to call if any changes in medications, doses, concerns, etc. Patient expresses understanding and has no further questions at this time.    Amparo Echeverria

## 2020-10-22 ENCOUNTER — ANTICOAGULATION VISIT (OUTPATIENT)
Dept: PHARMACY | Facility: HOSPITAL | Age: 80
End: 2020-10-22

## 2020-10-22 DIAGNOSIS — Z95.2 MECHANICAL HEART VALVE PRESENT: ICD-10-CM

## 2020-10-22 DIAGNOSIS — Z95.2 H/O AORTIC VALVE REPLACEMENT: ICD-10-CM

## 2020-10-22 LAB — INR PPP: 2.6

## 2020-10-22 NOTE — PROGRESS NOTES
Anticoagulation Clinic Progress Note    Anticoagulation Summary  As of 10/22/2020    INR goal:  2.5-3.5   TTR:  90.3 % (1.8 y)   INR used for dosin.60 (10/21/2020)   Warfarin maintenance plan:  7.5 mg every Wed; 5 mg all other days   Weekly warfarin total:  37.5 mg   No change documented:  Laurie Harvey   Plan last modified:  Layla Ortega, [facility] (2019)   Next INR check:  2020   Priority:  High   Target end date:  Indefinite    Indications    H/O aortic valve replacement [Z95.2]  Mechanical heart valve present [Z95.2]             Anticoagulation Episode Summary     INR check location:  Home Draw    Preferred lab:      Send INR reminders to:   [facility]  POOL    Comments:  **[facility]**      Anticoagulation Care Providers     Provider Role Specialty Phone number    Donya Gagnon MD Referring Cardiology [phone]          Clinic Interview:  No pertinent clinical findings have been reported.    INR History:  Anticoagulation Monitoring 2020 10/7/2020 10/22/2020   INR 2.80 2.50 2.60   INR Date 2020 10/7/2020 10/21/2020   INR Goal 2.5-3.5 2.5-3.5 2.5-3.5   Trend Same Same Same   Last Week Total 37.5 mg 37.5 mg 37.5 mg   Next Week Total 37.5 mg 37.5 mg 37.5 mg   Sun 5 mg 5 mg 5 mg   Mon 5 mg 5 mg 5 mg   Tue 5 mg 5 mg 5 mg   Wed 7.5 mg 7.5 mg 7.5 mg   Thu 5 mg 5 mg 5 mg   Fri 5 mg 5 mg 5 mg   Sat 5 mg 5 mg 5 mg   Visit Report - - -   Some recent data might be hidden       Plan:  1. INR is therapeutic today- see above in Anticoagulation Summary.    Bobby L Tapley to continue their warfarin regimen- see above in Anticoagulation Summary.  2. Follow up in 2 weeks  3. Pt has agreed to only be called if INR out of range. They have been instructed to call if any changes in medications, doses, concerns, etc. Patient expresses understanding and has no further questions at this time.    Laurie Harvey

## 2020-11-18 ENCOUNTER — ANTICOAGULATION VISIT (OUTPATIENT)
Dept: PHARMACY | Facility: HOSPITAL | Age: 80
End: 2020-11-18

## 2020-11-18 DIAGNOSIS — Z95.2 MECHANICAL HEART VALVE PRESENT: ICD-10-CM

## 2020-11-18 DIAGNOSIS — Z95.2 H/O AORTIC VALVE REPLACEMENT: ICD-10-CM

## 2020-11-18 LAB — INR PPP: 2.8

## 2020-11-18 NOTE — PROGRESS NOTES
Anticoagulation Clinic Progress Note    Anticoagulation Summary  As of 2020    INR goal:  2.5-3.5   TTR:  90.7 % (1.9 y)   INR used for dosin.80 (2020)   Warfarin maintenance plan:  7.5 mg every Wed; 5 mg all other days   Weekly warfarin total:  37.5 mg   No change documented:  Kayleen Rodriguez   Plan last modified:  Layla Ortega, [facility] (2019)   Next INR check:  2020   Priority:  High   Target end date:  Indefinite    Indications    H/O aortic valve replacement [Z95.2]  Mechanical heart valve present [Z95.2]             Anticoagulation Episode Summary     INR check location:  Home Draw    Preferred lab:      Send INR reminders to:   [facility]  POOL    Comments:  **[facility]**      Anticoagulation Care Providers     Provider Role Specialty Phone number    Donya Gagnon MD Referring Cardiology [phone]          Clinic Interview:  No pertinent clinical findings have been reported.    INR History:  Anticoagulation Monitoring 10/7/2020 10/22/2020 2020   INR 2.50 2.60 2.80   INR Date 10/7/2020 10/21/2020 2020   INR Goal 2.5-3.5 2.5-3.5 2.5-3.5   Trend Same Same Same   Last Week Total 37.5 mg 37.5 mg 37.5 mg   Next Week Total 37.5 mg 37.5 mg 37.5 mg   Sun 5 mg 5 mg 5 mg   Mon 5 mg 5 mg 5 mg   Tue 5 mg 5 mg 5 mg   Wed 7.5 mg 7.5 mg 7.5 mg   Thu 5 mg 5 mg 5 mg   Fri 5 mg 5 mg 5 mg   Sat 5 mg 5 mg 5 mg   Visit Report - - -   Some recent data might be hidden       Plan:  1. INR is therapeutic today- see above in Anticoagulation Summary.    Bobby L Tapley to continue their warfarin regimen- see above in Anticoagulation Summary.  2. Follow up in 2 weeks  3. Pt has agreed to only be called if INR out of range. They have been instructed to call if any changes in medications, doses, concerns, etc. Patient expresses understanding and has no further questions at this time.    Kayleen Rodriguez

## 2020-12-01 LAB — INR PPP: 2.5

## 2020-12-02 ENCOUNTER — ANTICOAGULATION VISIT (OUTPATIENT)
Dept: PHARMACY | Facility: HOSPITAL | Age: 80
End: 2020-12-02

## 2020-12-02 DIAGNOSIS — Z95.2 H/O AORTIC VALVE REPLACEMENT: ICD-10-CM

## 2020-12-02 DIAGNOSIS — Z95.2 MECHANICAL HEART VALVE PRESENT: ICD-10-CM

## 2020-12-02 LAB — INR PPP: 2.5

## 2020-12-02 NOTE — PROGRESS NOTES
Anticoagulation Clinic Progress Note    Anticoagulation Summary  As of 2020    INR goal:  2.5-3.5   TTR:  90.8 % (2 y)   INR used for dosin.50 (2020)   Warfarin maintenance plan:  7.5 mg every Wed; 5 mg all other days   Weekly warfarin total:  37.5 mg   No change documented:  Laurie Harvey   Plan last modified:  Layla Ortega, [facility] (2019)   Next INR check:  2020   Priority:  High   Target end date:  Indefinite    Indications    H/O aortic valve replacement [Z95.2]  Mechanical heart valve present [Z95.2]             Anticoagulation Episode Summary     INR check location:  Home Draw    Preferred lab:      Send INR reminders to:   [facility]  POOL    Comments:  **[facility]**      Anticoagulation Care Providers     Provider Role Specialty Phone number    Donya Gagnon MD Referring Cardiology [phone]          Clinic Interview:  No pertinent clinical findings have been reported.    INR History:  Anticoagulation Monitoring 10/22/2020 2020 2020   INR 2.60 2.80 2.50   INR Date 10/21/2020 2020 2020   INR Goal 2.5-3.5 2.5-3.5 2.5-3.5   Trend Same Same Same   Last Week Total 37.5 mg 37.5 mg 37.5 mg   Next Week Total 37.5 mg 37.5 mg 37.5 mg   Sun 5 mg 5 mg 5 mg   Mon 5 mg 5 mg 5 mg   Tue 5 mg 5 mg 5 mg   Wed 7.5 mg 7.5 mg 7.5 mg   Thu 5 mg 5 mg 5 mg   Fri 5 mg 5 mg 5 mg   Sat 5 mg 5 mg 5 mg   Visit Report - - -   Some recent data might be hidden       Plan:  1. INR is therapeutic today- see above in Anticoagulation Summary.    Bobby L Tapley to continue their warfarin regimen- see above in Anticoagulation Summary.  2. Follow up in 2 weeks  3. Pt has agreed to only be called if INR out of range. They have been instructed to call if any changes in medications, doses, concerns, etc. Patient expresses understanding and has no further questions at this time.    Laurie Harvey

## 2020-12-16 ENCOUNTER — ANTICOAGULATION VISIT (OUTPATIENT)
Dept: PHARMACY | Facility: HOSPITAL | Age: 80
End: 2020-12-16

## 2020-12-16 DIAGNOSIS — Z95.2 MECHANICAL HEART VALVE PRESENT: ICD-10-CM

## 2020-12-16 DIAGNOSIS — Z95.2 H/O AORTIC VALVE REPLACEMENT: ICD-10-CM

## 2020-12-16 LAB — INR PPP: 2.5

## 2020-12-16 NOTE — PROGRESS NOTES
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    INR History:  Anticoagulation Monitoring 2020   INR 2.80 2.50 2.50   INR Date 2020   INR Goal 2.5-3.5 2.5-3.5 2.5-3.5   Trend Same Same Same   Last Week Total 37.5 mg 37.5 mg 37.5 mg   Next Week Total 37.5 mg 37.5 mg 37.5 mg   Sun 5 mg 5 mg 5 mg   Mon 5 mg 5 mg 5 mg   Tue 5 mg 5 mg 5 mg   Wed 7.5 mg 7.5 mg 7.5 mg   Thu 5 mg 5 mg 5 mg   Fri 5 mg 5 mg 5 mg   Sat 5 mg 5 mg 5 mg   Visit Report - - -   Some recent data might be hidden       Plan:  1. INR is therapeutic today- see above in Anticoagulation Summary.    Bobby L Tapley to continue their warfarin regimen- see above in Anticoagulation Summary.  2. Follow up in 2 weeks  3. Pt has agreed to only be called if INR out of range. They have been instructed to call if any changes in medications, doses, concerns, etc. Patient expresses understanding and has no further questions at this time.    Laurie Harvey

## 2020-12-30 ENCOUNTER — ANTICOAGULATION VISIT (OUTPATIENT)
Dept: PHARMACY | Facility: HOSPITAL | Age: 80
End: 2020-12-30

## 2020-12-30 DIAGNOSIS — Z95.2 H/O AORTIC VALVE REPLACEMENT: ICD-10-CM

## 2020-12-30 DIAGNOSIS — Z95.2 MECHANICAL HEART VALVE PRESENT: ICD-10-CM

## 2020-12-30 LAB — INR PPP: 2.8

## 2020-12-30 NOTE — PROGRESS NOTES
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    INR History:  Anticoagulation Monitoring 2020   INR 2.50 2.50 2.80   INR Date 2020   INR Goal 2.5-3.5 2.5-3.5 2.5-3.5   Trend Same Same Same   Last Week Total 37.5 mg 37.5 mg 37.5 mg   Next Week Total 37.5 mg 37.5 mg 37.5 mg   Sun 5 mg 5 mg 5 mg   Mon 5 mg 5 mg 5 mg   Tue 5 mg 5 mg 5 mg   Wed 7.5 mg 7.5 mg 7.5 mg   Thu 5 mg 5 mg 5 mg   Fri 5 mg 5 mg 5 mg   Sat 5 mg 5 mg 5 mg   Visit Report - - -   Some recent data might be hidden       Plan:  1. INR is therapeutic today- see above in Anticoagulation Summary.    Bobby L Tapley to continue their warfarin regimen- see above in Anticoagulation Summary.  2. Follow up in 2 weeks  3. Pt has agreed to only be called if INR out of range. They have been instructed to call if any changes in medications, doses, concerns, etc. Patient expresses understanding and has no further questions at this time.    Amparo Echeverria

## 2021-01-13 ENCOUNTER — ANTICOAGULATION VISIT (OUTPATIENT)
Dept: PHARMACY | Facility: HOSPITAL | Age: 81
End: 2021-01-13

## 2021-01-13 DIAGNOSIS — Z95.2 MECHANICAL HEART VALVE PRESENT: ICD-10-CM

## 2021-01-13 DIAGNOSIS — Z95.2 H/O AORTIC VALVE REPLACEMENT: ICD-10-CM

## 2021-01-13 LAB — INR PPP: 3.3

## 2021-01-13 NOTE — PROGRESS NOTES
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    INR History:  Anticoagulation Monitoring 12/16/2020 12/30/2020 1/13/2021   INR 2.50 2.80 3.30   INR Date 12/16/2020 12/30/2020 1/13/2021   INR Goal 2.5-3.5 2.5-3.5 2.5-3.5   Trend Same Same Same   Last Week Total 37.5 mg 37.5 mg 37.5 mg   Next Week Total 37.5 mg 37.5 mg 37.5 mg   Sun 5 mg 5 mg 5 mg   Mon 5 mg 5 mg 5 mg   Tue 5 mg 5 mg 5 mg   Wed 7.5 mg 7.5 mg 7.5 mg   Thu 5 mg 5 mg 5 mg   Fri 5 mg 5 mg 5 mg   Sat 5 mg 5 mg 5 mg   Visit Report - - -   Some recent data might be hidden       Plan:  1. INR is therapeutic today- see above in Anticoagulation Summary.    Bobby L Tapley to continue their warfarin regimen- see above in Anticoagulation Summary.  2. Follow up in 2 weeks  3. Pt has agreed to only be called if INR out of range. They have been instructed to call if any changes in medications, doses, concerns, etc. Patient expresses understanding and has no further questions at this time.    Amparo Echeverria

## 2021-01-28 ENCOUNTER — ANTICOAGULATION VISIT (OUTPATIENT)
Dept: PHARMACY | Facility: HOSPITAL | Age: 81
End: 2021-01-28

## 2021-01-28 DIAGNOSIS — Z95.2 MECHANICAL HEART VALVE PRESENT: ICD-10-CM

## 2021-01-28 DIAGNOSIS — Z95.2 H/O AORTIC VALVE REPLACEMENT: ICD-10-CM

## 2021-01-28 LAB — INR PPP: 3.2

## 2021-01-28 NOTE — PROGRESS NOTES
Anticoagulation Clinic Progress Note    Anticoagulation Summary  As of 1/28/2021    INR goal:  2.5-3.5   TTR:  91.5 % (2.1 y)   INR used for dosing:  3.20 (1/28/2021)   Warfarin maintenance plan:  7.5 mg every Wed; 5 mg all other days   Weekly warfarin total:  37.5 mg   No change documented:  Amparo Echeverria   Plan last modified:  Layla Ortega, 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    INR History:  Anticoagulation Monitoring 12/30/2020 1/13/2021 1/28/2021   INR 2.80 3.30 3.20   INR Date 12/30/2020 1/13/2021 1/28/2021   INR Goal 2.5-3.5 2.5-3.5 2.5-3.5   Trend Same Same Same   Last Week Total 37.5 mg 37.5 mg 37.5 mg   Next Week Total 37.5 mg 37.5 mg 37.5 mg   Sun 5 mg 5 mg 5 mg   Mon 5 mg 5 mg 5 mg   Tue 5 mg 5 mg 5 mg   Wed 7.5 mg 7.5 mg 7.5 mg   Thu 5 mg 5 mg 5 mg   Fri 5 mg 5 mg 5 mg   Sat 5 mg 5 mg 5 mg   Visit Report - - -   Some recent data might be hidden       Plan:  1. INR is therapeutic today- see above in Anticoagulation Summary.    Bobby L Tapley to continue their warfarin regimen- see above in Anticoagulation Summary.  2. Follow up in 2 weeks  3. Pt has agreed to only be called if INR out of range. They have been instructed to call if any changes in medications, doses, concerns, etc. Patient expresses understanding and has no further questions at this time.    Amparo Echeverria

## 2021-02-01 ENCOUNTER — HOSPITAL ENCOUNTER (OUTPATIENT)
Dept: CT IMAGING | Facility: HOSPITAL | Age: 81
Discharge: HOME | End: 2021-02-01
Admitting: NURSE PRACTITIONER

## 2021-02-01 DIAGNOSIS — I71.20 THORACIC AORTIC ANEURYSM WITHOUT RUPTURE (HCC): ICD-10-CM

## 2021-02-01 DIAGNOSIS — I71.22 AORTIC ARCH ANEURYSM (HCC): ICD-10-CM

## 2021-02-01 LAB — CREAT BLDA-MCNC: 0.6 MG/DL (ref 0.6–1.3)

## 2021-02-01 PROCEDURE — 0 IOPAMIDOL PER 1 ML: Performed by: NURSE PRACTITIONER

## 2021-02-01 PROCEDURE — 71275 CT ANGIOGRAPHY CHEST: CPT

## 2021-02-01 PROCEDURE — 82565 ASSAY OF CREATININE: CPT

## 2021-02-01 RX ADMIN — IOPAMIDOL 95 ML: 755 INJECTION, SOLUTION INTRAVENOUS at 15:01

## 2021-02-10 ENCOUNTER — ANTICOAGULATION VISIT (OUTPATIENT)
Dept: PHARMACY | Facility: HOSPITAL | Age: 81
End: 2021-02-10

## 2021-02-10 DIAGNOSIS — Z95.2 H/O AORTIC VALVE REPLACEMENT: ICD-10-CM

## 2021-02-10 DIAGNOSIS — Z95.2 MECHANICAL HEART VALVE PRESENT: ICD-10-CM

## 2021-02-10 LAB — INR PPP: 2.5

## 2021-02-10 NOTE — PROGRESS NOTES
Anticoagulation Clinic Progress Note    Anticoagulation Summary  As of 2/10/2021    INR goal:  2.5-3.5   TTR:  91.7 % (2.2 y)   INR used for dosin.50 (2/10/2021)   Warfarin maintenance plan:  7.5 mg every Wed; 5 mg all other days   Weekly warfarin total:  37.5 mg   No change documented:  Amparo Echeverria   Plan last modified:  Layla Ortega 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    INR History:  Anticoagulation Monitoring 2021 2021 2/10/2021   INR 3.30 3.20 2.50   INR Date 2021 2021 2/10/2021   INR Goal 2.5-3.5 2.5-3.5 2.5-3.5   Trend Same Same Same   Last Week Total 37.5 mg 37.5 mg 37.5 mg   Next Week Total 37.5 mg 37.5 mg 37.5 mg   Sun 5 mg 5 mg 5 mg   Mon 5 mg 5 mg 5 mg   Tue 5 mg 5 mg 5 mg   Wed 7.5 mg 7.5 mg 7.5 mg   Thu 5 mg 5 mg 5 mg   Fri 5 mg 5 mg 5 mg   Sat 5 mg 5 mg 5 mg   Visit Report - - -   Some recent data might be hidden       Plan:  1. INR is therapeutic today- see above in Anticoagulation Summary.    Bobby L Tapley to continue their warfarin regimen- see above in Anticoagulation Summary.  2. Follow up in 2 weeks  3. Pt has agreed to only be called if INR out of range. They have been instructed to call if any changes in medications, doses, concerns, etc. Patient expresses understanding and has no further questions at this time.    Amparo Echeverria

## 2021-02-24 ENCOUNTER — ANTICOAGULATION VISIT (OUTPATIENT)
Dept: PHARMACY | Facility: HOSPITAL | Age: 81
End: 2021-02-24

## 2021-02-24 DIAGNOSIS — Z95.2 H/O AORTIC VALVE REPLACEMENT: ICD-10-CM

## 2021-02-24 DIAGNOSIS — Z95.2 MECHANICAL HEART VALVE PRESENT: ICD-10-CM

## 2021-02-24 LAB — INR PPP: 2.7

## 2021-02-24 NOTE — PROGRESS NOTES
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    INR History:  Anticoagulation Monitoring 2021 2/10/2021 2021   INR 3.20 2.50 2.70   INR Date 2021 2/10/2021 2021   INR Goal 2.5-3.5 2.5-3.5 2.5-3.5   Trend Same Same Same   Last Week Total 37.5 mg 37.5 mg 37.5 mg   Next Week Total 37.5 mg 37.5 mg 37.5 mg   Sun 5 mg 5 mg 5 mg   Mon 5 mg 5 mg 5 mg   Tue 5 mg 5 mg 5 mg   Wed 7.5 mg 7.5 mg 7.5 mg   Thu 5 mg 5 mg 5 mg   Fri 5 mg 5 mg 5 mg   Sat 5 mg 5 mg 5 mg   Visit Report - - -   Some recent data might be hidden       Plan:  1. INR is therapeutic today- see above in Anticoagulation Summary.    Bobby L Tapley to continue their warfarin regimen- see above in Anticoagulation Summary.  2. Follow up in 2 weeks  3. Pt has agreed to only be called if INR out of range. They have been instructed to call if any changes in medications, doses, concerns, etc. Patient expresses understanding and has no further questions at this time.    Laurie Harvey

## 2021-03-10 ENCOUNTER — ANTICOAGULATION VISIT (OUTPATIENT)
Dept: PHARMACY | Facility: HOSPITAL | Age: 81
End: 2021-03-10

## 2021-03-10 DIAGNOSIS — Z95.2 H/O AORTIC VALVE REPLACEMENT: ICD-10-CM

## 2021-03-10 DIAGNOSIS — Z95.2 MECHANICAL HEART VALVE PRESENT: ICD-10-CM

## 2021-03-10 LAB — INR PPP: 3.2

## 2021-03-10 NOTE — PROGRESS NOTES
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    INR History:  Anticoagulation Monitoring 2/10/2021 2/24/2021 3/10/2021   INR 2.50 2.70 3.20   INR Date 2/10/2021 2/24/2021 3/10/2021   INR Goal 2.5-3.5 2.5-3.5 2.5-3.5   Trend Same Same Same   Last Week Total 37.5 mg 37.5 mg 37.5 mg   Next Week Total 37.5 mg 37.5 mg 37.5 mg   Sun 5 mg 5 mg 5 mg   Mon 5 mg 5 mg 5 mg   Tue 5 mg 5 mg 5 mg   Wed 7.5 mg 7.5 mg 7.5 mg   Thu 5 mg 5 mg 5 mg   Fri 5 mg 5 mg 5 mg   Sat 5 mg 5 mg 5 mg   Visit Report - - -   Some recent data might be hidden       Plan:  1. INR is therapeutic today- see above in Anticoagulation Summary.    Bobby L Tapley to continue their warfarin regimen- see above in Anticoagulation Summary.  2. Follow up in 2 weeks  3. Pt has agreed to only be called if INR out of range. They have been instructed to call if any changes in medications, doses, concerns, etc. Patient expresses understanding and has no further questions at this time.    Kayleen Rodriguez

## 2021-03-15 ENCOUNTER — BULK ORDERING (OUTPATIENT)
Dept: CASE MANAGEMENT | Facility: OTHER | Age: 81
End: 2021-03-15

## 2021-03-15 DIAGNOSIS — Z23 IMMUNIZATION DUE: ICD-10-CM

## 2021-03-24 ENCOUNTER — ANTICOAGULATION VISIT (OUTPATIENT)
Dept: PHARMACY | Facility: HOSPITAL | Age: 81
End: 2021-03-24

## 2021-03-24 DIAGNOSIS — Z95.2 H/O AORTIC VALVE REPLACEMENT: ICD-10-CM

## 2021-03-24 DIAGNOSIS — Z95.2 MECHANICAL HEART VALVE PRESENT: ICD-10-CM

## 2021-03-24 LAB — INR PPP: 3.1

## 2021-03-24 NOTE — PROGRESS NOTES
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    INR History:  Anticoagulation Monitoring 2/24/2021 3/10/2021 3/24/2021   INR 2.70 3.20 3.10   INR Date 2/24/2021 3/10/2021 3/24/2021   INR Goal 2.5-3.5 2.5-3.5 2.5-3.5   Trend Same Same Same   Last Week Total 37.5 mg 37.5 mg 37.5 mg   Next Week Total 37.5 mg 37.5 mg 37.5 mg   Sun 5 mg 5 mg 5 mg   Mon 5 mg 5 mg 5 mg   Tue 5 mg 5 mg 5 mg   Wed 7.5 mg 7.5 mg 7.5 mg   Thu 5 mg 5 mg 5 mg   Fri 5 mg 5 mg 5 mg   Sat 5 mg 5 mg 5 mg   Visit Report - - -   Some recent data might be hidden       Plan:  1. INR is therapeutic today- see above in Anticoagulation Summary.    Bobby L Tapley to continue their warfarin regimen- see above in Anticoagulation Summary.  2. Follow up in 2 weeks  3. Pt has agreed to only be called if INR out of range. They have been instructed to call if any changes in medications, doses, concerns, etc. Patient expresses understanding and has no further questions at this time.    Amparo Echeverria

## 2021-04-07 LAB — INR PPP: 2.8

## 2021-04-08 ENCOUNTER — ANTICOAGULATION VISIT (OUTPATIENT)
Dept: PHARMACY | Facility: HOSPITAL | Age: 81
End: 2021-04-08

## 2021-04-08 DIAGNOSIS — Z95.2 H/O AORTIC VALVE REPLACEMENT: ICD-10-CM

## 2021-04-08 DIAGNOSIS — Z95.2 MECHANICAL HEART VALVE PRESENT: ICD-10-CM

## 2021-04-08 NOTE — PROGRESS NOTES
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    INR History:  Anticoagulation Monitoring 3/10/2021 3/24/2021 2021   INR 3.20 3.10 2.80   INR Date 3/10/2021 3/24/2021 2021   INR Goal 2.5-3.5 2.5-3.5 2.5-3.5   Trend Same Same Same   Last Week Total 37.5 mg 37.5 mg 37.5 mg   Next Week Total 37.5 mg 37.5 mg 37.5 mg   Sun 5 mg 5 mg 5 mg   Mon 5 mg 5 mg 5 mg   Tue 5 mg 5 mg 5 mg   Wed 7.5 mg 7.5 mg 7.5 mg   Thu 5 mg 5 mg 5 mg   Fri 5 mg 5 mg 5 mg   Sat 5 mg 5 mg 5 mg   Visit Report - - -   Some recent data might be hidden       Plan:  1. INR is therapeutic today- see above in Anticoagulation Summary.    Bobby L Tapley to continue their warfarin regimen- see above in Anticoagulation Summary.  2. Follow up in 2 weeks  3. Pt has agreed to only be called if INR out of range. They have been instructed to call if any changes in medications, doses, concerns, etc. Patient expresses understanding and has no further questions at this time.    Kayleen Rodriguez

## 2021-04-20 ENCOUNTER — OFFICE VISIT (OUTPATIENT)
Dept: CARDIAC SURGERY | Facility: CLINIC | Age: 81
End: 2021-04-20

## 2021-04-20 VITALS
SYSTOLIC BLOOD PRESSURE: 146 MMHG | RESPIRATION RATE: 20 BRPM | HEART RATE: 99 BPM | DIASTOLIC BLOOD PRESSURE: 85 MMHG | OXYGEN SATURATION: 94 % | HEIGHT: 71 IN | TEMPERATURE: 97.8 F | BODY MASS INDEX: 22.68 KG/M2 | WEIGHT: 162 LBS

## 2021-04-20 DIAGNOSIS — Z01.810 PRE-OPERATIVE CARDIOVASCULAR EXAMINATION, HIGH RISK SURGERY: ICD-10-CM

## 2021-04-20 DIAGNOSIS — Z95.1 HX OF CABG: ICD-10-CM

## 2021-04-20 DIAGNOSIS — Z95.2 MECHANICAL HEART VALVE PRESENT: ICD-10-CM

## 2021-04-20 DIAGNOSIS — Z98.890 HX OF ASCENDING AORTA REPAIR: ICD-10-CM

## 2021-04-20 DIAGNOSIS — I10 ESSENTIAL HYPERTENSION: ICD-10-CM

## 2021-04-20 DIAGNOSIS — Z99.89 OSA ON CPAP: ICD-10-CM

## 2021-04-20 DIAGNOSIS — G47.34 SLEEP RELATED HYPOXIA: ICD-10-CM

## 2021-04-20 DIAGNOSIS — G47.33 OSA ON CPAP: ICD-10-CM

## 2021-04-20 DIAGNOSIS — I71.20 THORACIC AORTIC ANEURYSM WITHOUT RUPTURE (HCC): ICD-10-CM

## 2021-04-20 DIAGNOSIS — R41.3 MEMORY LOSS: ICD-10-CM

## 2021-04-20 DIAGNOSIS — I71.22 AORTIC ARCH ANEURYSM (HCC): ICD-10-CM

## 2021-04-20 DIAGNOSIS — Z95.2 H/O AORTIC VALVE REPLACEMENT: Primary | ICD-10-CM

## 2021-04-20 DIAGNOSIS — E78.2 MIXED HYPERLIPIDEMIA: ICD-10-CM

## 2021-04-20 DIAGNOSIS — N40.0 BENIGN PROSTATIC HYPERPLASIA, UNSPECIFIED WHETHER LOWER URINARY TRACT SYMPTOMS PRESENT: ICD-10-CM

## 2021-04-20 PROCEDURE — 99215 OFFICE O/P EST HI 40 MIN: CPT | Performed by: THORACIC SURGERY (CARDIOTHORACIC VASCULAR SURGERY)

## 2021-04-20 RX ORDER — AMOXICILLIN 250 MG
1 CAPSULE ORAL DAILY
COMMUNITY
End: 2021-08-25

## 2021-04-21 ENCOUNTER — ANTICOAGULATION VISIT (OUTPATIENT)
Dept: PHARMACY | Facility: HOSPITAL | Age: 81
End: 2021-04-21

## 2021-04-21 DIAGNOSIS — Z95.2 H/O AORTIC VALVE REPLACEMENT: Primary | ICD-10-CM

## 2021-04-21 DIAGNOSIS — Z95.2 MECHANICAL HEART VALVE PRESENT: ICD-10-CM

## 2021-04-21 LAB — INR PPP: 3.5

## 2021-04-21 NOTE — PROGRESS NOTES
Anticoagulation Clinic Progress Note    Anticoagulation Summary  As of 4/21/2021    INR goal:  2.5-3.5   TTR:  92.4 % (2.3 y)   INR used for dosing:  3.50 (4/21/2021)   Warfarin maintenance plan:  7.5 mg every Wed; 5 mg all other days   Weekly warfarin total:  37.5 mg   Plan last modified:  Layla Ortega, 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    INR History:  Anticoagulation Monitoring 3/24/2021 4/8/2021 4/21/2021   INR 3.10 2.80 3.50   INR Date 3/24/2021 4/7/2021 4/21/2021   INR Goal 2.5-3.5 2.5-3.5 2.5-3.5   Trend Same Same Same   Last Week Total 37.5 mg 37.5 mg 37.5 mg   Next Week Total 37.5 mg 37.5 mg 37.5 mg   Sun 5 mg 5 mg 5 mg   Mon 5 mg 5 mg 5 mg   Tue 5 mg 5 mg 5 mg   Wed 7.5 mg 7.5 mg 7.5 mg   Thu 5 mg 5 mg 5 mg   Fri 5 mg 5 mg 5 mg   Sat 5 mg 5 mg 5 mg   Visit Report - - -   Some recent data might be hidden       Plan:  1. INR is therapeutic today- see above in Anticoagulation Summary.    Bobby L Tapley to continue their warfarin regimen- see above in Anticoagulation Summary.  2. Follow up in 2 weeks  3. Pt has agreed to only be called if INR out of range. They have been instructed to call if any changes in medications, doses, concerns, etc. Patient expresses understanding and has no further questions at this time.    Kayleen Rodriguez

## 2021-04-29 NOTE — PROGRESS NOTES
4/28/2021    Seen on 4/20/2021    Chief Complaint:     Follow-up evaluation of thoracic aortic false aneurysm    History of Present Illness:       Dear Donya and Colleagues,  It was nice to see Bobby L Tapley in follow up evaluation for his thoracic aortic false aneurysm. He is a 81 y.o. male with a history of multiple medical problems including hypertension, obesity, hyperlipidemia, depression, sleep apnea and prior aortic aneurysm status post composite mechanical conduit repair of the proximal aorta in 2004 in addition to vein bypass to the LAD.  He has done well over the years except for the use of steroids for his arthritis.  He has no family history of aneurysms, dissections or connective tissue disorders. He denies chest pain or upper back pain, syncope, TIA, orthopnea, PND or lower extremity edema.  His wife states that he has memory loss issues.  I saw him 4 months ago and he had a false aneurysm around his proximal aorta repair which was around 5.5 cm may be a bit more in my measurements. His last chest CT showed the false aneurysm to be approximately 6.5 cm to 4.6 cm.  Although reported a dilated aortic root of 5.1 cm, the patient does have a dacryon graft therefore the contrast is related to a false aneurysm as well.  The distal aortic arch residual from the operation has a diameter approximately 4 cm.  There is no dissection or ulceration.  She is here to discuss possible surgical options.  Patient Active Problem List   Diagnosis   • H/O aortic valve replacement   • BP (high blood pressure)   • HLD (hyperlipidemia)   • Hx of CABG   • hx of mechanical AV conduit ([id number] 23 mm) by Dr. Santana Bailey at [facility] 2/25/2004   • JOY on CPAP   • Hypersomnia with sleep apnea   • Weight loss   • Witnessed episode of apnea   • BPH (benign prostatic hyperplasia)   • Chronic headaches   • CTS (carpal tunnel syndrome)   • Dyslipidemia   • High frequency hearing loss   • Mechanical heart valve present   •  Nephrolithiasis   • Osteoarthritis   • Sleep related hypoxia   • Situational depression   • Subcutaneous lipoma   • Leg pain, right   • Aortic arch aneurysm (CMS/HCC)   • Coronary artery disease involving native coronary artery of native heart without angina pectoris   • Memory loss       Past Medical History:   Diagnosis Date   • Aneurysm of aortic arch (CMS/HCC)    • Aortic insufficiency    • Aortic stenosis    • Arthritis    • Ascending aortic aneurysm (CMS/HCC)    • CAD (coronary artery disease)    • Enlarged prostate    • H/O aortic valve replacement    • H/O echocardiogram 02/10/2015   • Health care maintenance    • History of EKG 07/07/2015   • Hyperlipidemia    • Hypertension    • Kidney stones    • JOY on CPAP    • Precordial pain    • Shortness of breath        Past Surgical History:   Procedure Laterality Date   • AORTIC VALVE REPAIR/REPLACEMENT     • CARDIAC CATHETERIZATION  11/27/2013   • COLONOSCOPY N/A 8/15/2017    Procedure: COLONOSCOPY;  Surgeon: Hu Bergman MD;  Location: [facility] ENDOSCOPY;  Service:    • CORONARY ARTERY BYPASS GRAFT     • ROTATOR CUFF REPAIR         No Known Allergies      Current Outpatient Medications:   •  aspirin 81 MG tablet, Take 81 mg by mouth Daily., Disp: , Rfl:   •  Calcium Carbonate-Vit D-Min (CALTRATE 600+D PLUS MINERALS) 600-800 MG-UNIT tablet, Take 1 tablet by mouth Daily., Disp: , Rfl:   •  enalapril (VASOTEC) 2.5 MG tablet, Take 2.5 mg by mouth Daily., Disp: , Rfl:   •  finasteride (PROSCAR) 5 MG tablet, Take 5 mg by mouth Daily., Disp: , Rfl:   •  HYDROcodone-acetaminophen (NORCO) 5-325 MG per tablet, Take 1 tablet by mouth as needed., Disp: , Rfl:   •  L-Lysine 500 MG capsule, Take 1 capsule by mouth As Needed., Disp: , Rfl:   •  Multiple Vitamins-Minerals (CENTRUM SILVER) tablet, Take 1 tablet by mouth Daily., Disp: , Rfl:   •  omeprazole (priLOSEC) 20 MG capsule, Take 20 mg by mouth Daily., Disp: , Rfl:   •  predniSONE (DELTASONE) 5 MG tablet, Take 5 mg by  mouth Daily., Disp: , Rfl:   •  sennosides-docusate (Stool Softener & Laxative) 8.6-50 MG per tablet, Take 1 tablet by mouth Daily., Disp: , Rfl:   •  vitamin C (ASCORBIC ACID) 500 MG tablet, Take 1,000 mg by mouth Daily., Disp: , Rfl:   •  warfarin (COUMADIN) 5 MG tablet, TAKE 1 1/2 TABLETS BY MOUTH ON WEDNESDAYS. TAKE 1 TABLET ON ALL OTHER DAYS OR AS DIRECTED BY MED [facility]., Disp: 105 tablet, Rfl: 1    Social History     Socioeconomic History   • Marital status:      Spouse name: Not on file   • Number of children: Not on file   • Years of education: Not on file   • Highest education level: Not on file   Tobacco Use   • Smoking status: Never Smoker   • Smokeless tobacco: Never Used   Vaping Use   • Vaping Use: Never used   Substance and Sexual Activity   • Alcohol use: No     Comment: No caffeine use   • Drug use: No   • Sexual activity: Defer       Family History   Problem Relation Age of Onset   • Cancer Mother    • Aneurysm Father    • Heart disease Father    • Aneurysm Brother    • Heart disease Brother    • Hypertension Cousin    • Obesity Cousin    • Lung cancer Cousin    • Malig Hyperthermia Neg Hx      Review of Systems   Constitutional: Positive for fatigue.   Respiratory: Positive for apnea and shortness of breath. Negative for chest tightness.    Cardiovascular: Negative for chest pain, palpitations and leg swelling.   Genitourinary: Negative for flank pain.   Neurological: Negative for dizziness and weakness.   Psychiatric/Behavioral: Positive for sleep disturbance.   All other systems reviewed and are negative.      Physical Exam:    Vital Signs:  Weight: 73.5 kg (162 lb)   Body mass index is 22.59 kg/m².  Temp: 97.8 °F (36.6 °C)   Heart Rate: 99   BP: 146/85     Constitutional:       Appearance: Well-developed.   Eyes:      Conjunctiva/sclera: Conjunctivae normal.      Pupils: Pupils are equal, round, and reactive to light.   HENT:      Head: Normocephalic and atraumatic.      Nose: Nose  normal.   Neck:      Thyroid: No thyromegaly.      Vascular: No JVD.      Lymphadenopathy: No cervical adenopathy.   Pulmonary:      Effort: Pulmonary effort is normal.      Breath sounds: Normal breath sounds. No rales.   Cardiovascular:      PMI at left midclavicular line. Normal rate. Regular rhythm.      Murmurs: There is no murmur.      No gallop. click. Mechanical valve sounds in the aortic position and mid sternum No rub.   Pulses:     Intact distal pulses. No decreased pulses.   Edema:     Peripheral edema absent.   Abdominal:      General: Bowel sounds are normal. There is no distension.      Palpations: Abdomen is soft. There is no abdominal mass.      Tenderness: There is no abdominal tenderness.   Musculoskeletal: Normal range of motion.         General: No tenderness or deformity.      Cervical back: Normal range of motion and neck supple. Skin:     General: Skin is warm and dry.      Findings: No erythema or rash.   Neurological:      Mental Status: Alert and oriented to person, place, and time.      Deep Tendon Reflexes: Reflexes are normal and symmetric.   Psychiatric:         Behavior: Behavior normal.     Sternal incision is well-healed     Assessment:     Problems Addressed this Visit        Cardiac and Vasculature    H/O aortic valve replacement - Primary    BP (high blood pressure)    HLD (hyperlipidemia)    Hx of CABG    hx of mechanical AV conduit ([id number] 23 mm) by Dr. Santana Bailey at [facility] 2/25/2004    Mechanical heart valve present    Aortic arch aneurysm (CMS/HCC)       Genitourinary and Reproductive     BPH (benign prostatic hyperplasia)       Neuro    Memory loss       Sleep    JOY on CPAP    Sleep related hypoxia      Diagnoses       Codes Comments    H/O aortic valve replacement    -  Primary ICD-10-CM: Z95.2  ICD-9-CM: V43.3     Essential hypertension     ICD-10-CM: I10  ICD-9-CM: 401.9     Hx of CABG     ICD-10-CM: Z95.1  ICD-9-CM: V45.81     hx of mechanical AV conduit  ([id number] 23 mm) by Dr. Santana Bailey at [facility] 2/25/2004     ICD-10-CM: Z98.890  ICD-9-CM: V15.1     Mixed hyperlipidemia     ICD-10-CM: E78.2  ICD-9-CM: 272.2     Mechanical heart valve present     ICD-10-CM: Z95.2  ICD-9-CM: V43.3     Aortic arch aneurysm (CMS/HCC)     ICD-10-CM: I71.2  ICD-9-CM: 441.2     Benign prostatic hyperplasia, unspecified whether lower urinary tract symptoms present     ICD-10-CM: N40.0  ICD-9-CM: 600.00     Memory loss     ICD-10-CM: R41.3  ICD-9-CM: 780.93     JOY on CPAP     ICD-10-CM: G47.33, Z99.89  ICD-9-CM: 327.23, V46.8     Sleep related hypoxia     ICD-10-CM: G47.34  ICD-9-CM: 327.24           1. S/p proximal aortic replacement with a composite mechanical conduit and hemiarch anastomosis and CABG  2. Enlarging false aneurysm around the conduit  3. Sleep apnea borderline control  4. Arthritis on steroids.  Patient is tapering down  5. On artery disease status post CABG, no angina  6. Memory loss issues per the wife    Recommendation/Plan:     The patient has a large and enlarging false aneurysm in the proximal thoracic aorta extending into the aortic arch.  He is asymptomatic.  The fourth aneurysm is over 6.5 cm therefore is at risk of further enlargement or rupture.  He has previous bypass surgery and it is unknown the status of his coronary vasculature.  I recommend ascending aorta and arch replacement to prevent rupture and prolong survival.  I believe the risk of surgery is high but is not prohibitive.  Major adverse factors for surgery are the presence of the aneurysm attached to the posterior part of the sternum, presence of coronary artery disease, use of steroids for his arthritis although the patient states he will be discontinuing them soon and recent findings of memory loss issues.  I discussed with his wife and the patient the risk of surgery including a 10% mortality and 20% complication rate including renal failure, stroke, encephalopathy, respiratory  failure, bleeding, infection, tracheostomy need another.  We discussed also the importance for the patient to be off the steroids before such procedure.  They wish to consider the procedure.  The plan is to obtain pulmonary function tests, cardiac cath, routine lab evaluation and then, make the final determination for surgery.  He will need prolonged circulatory arrest with cerebral perfusion and poorly axillary artery cannulation and chest opening with ongoing carpal bypass.  Hypertension, is borderline controlled.  I discussed the patient importance of decrease the DP DT and maintain the systolic blood pressure below 130 mmHg.    Thank you for allowing me to participate in his care.    Regards,    Mendez Cox M.D.  I spent approximately 45 minutes in examining the patient, discussing the findings, reviewing the studies myself, discussing the case with cardiology, explained the options and the surgical intervention and documented in the electronic record

## 2021-05-04 DIAGNOSIS — I25.10 CORONARY ARTERY DISEASE INVOLVING NATIVE CORONARY ARTERY OF NATIVE HEART WITHOUT ANGINA PECTORIS: Primary | ICD-10-CM

## 2021-05-04 DIAGNOSIS — I71.22 AORTIC ARCH ANEURYSM (HCC): ICD-10-CM

## 2021-05-06 ENCOUNTER — ANTICOAGULATION VISIT (OUTPATIENT)
Dept: PHARMACY | Facility: HOSPITAL | Age: 81
End: 2021-05-06

## 2021-05-06 ENCOUNTER — TRANSCRIBE ORDERS (OUTPATIENT)
Dept: ADMINISTRATIVE | Facility: HOSPITAL | Age: 81
End: 2021-05-06

## 2021-05-06 DIAGNOSIS — Z95.2 MECHANICAL HEART VALVE PRESENT: ICD-10-CM

## 2021-05-06 DIAGNOSIS — Z95.2 H/O AORTIC VALVE REPLACEMENT: Primary | ICD-10-CM

## 2021-05-06 DIAGNOSIS — Z01.818 OTHER SPECIFIED PRE-OPERATIVE EXAMINATION: Primary | ICD-10-CM

## 2021-05-06 LAB — INR PPP: 3

## 2021-05-06 NOTE — PROGRESS NOTES
Anticoagulation Clinic Progress Note    Anticoagulation Summary  As of 5/6/2021    INR goal:  2.5-3.5   TTR:  92.5 % (2.4 y)   INR used for dosing:  3.00 (5/6/2021)   Warfarin maintenance plan:  7.5 mg every Wed; 5 mg all other days   Weekly warfarin total:  37.5 mg   No change documented:  Laurie Harvey   Plan last modified:  Layla Ortega [facility] (11/12/2019)   Next INR check:  5/20/2021   Priority:  High   Target end date:  Indefinite    Indications    H/O aortic valve replacement [Z95.2]  Mechanical heart valve present [Z95.2]             Anticoagulation Episode Summary     INR check location:  Home Draw    Preferred lab:      Send INR reminders to:   [facility]  POOL    Comments:  **[facility]**      Anticoagulation Care Providers     Provider Role Specialty Phone number    Donya Gagnon MD Referring Cardiology [phone]          Clinic Interview:  No pertinent clinical findings have been reported.    INR History:  Anticoagulation Monitoring 4/8/2021 4/21/2021 5/6/2021   INR 2.80 3.50 3.00   INR Date 4/7/2021 4/21/2021 5/6/2021   INR Goal 2.5-3.5 2.5-3.5 2.5-3.5   Trend Same Same Same   Last Week Total 37.5 mg 37.5 mg 37.5 mg   Next Week Total 37.5 mg 37.5 mg 37.5 mg   Sun 5 mg 5 mg 5 mg   Mon 5 mg 5 mg 5 mg   Tue 5 mg 5 mg 5 mg   Wed 7.5 mg 7.5 mg 7.5 mg   Thu 5 mg 5 mg 5 mg   Fri 5 mg 5 mg 5 mg   Sat 5 mg 5 mg 5 mg   Visit Report - - -   Some recent data might be hidden       Plan:  1. INR is therapeutic today- see above in Anticoagulation Summary.    Bobby L Tapley to continue their warfarin regimen- see above in Anticoagulation Summary.  2. Follow up in 2 weeks  3. Pt has agreed to only be called if INR out of range. They have been instructed to call if any changes in medications, doses, concerns, etc. Patient expresses understanding and has no further questions at this time.    Regis Chu [facility]

## 2021-05-07 ENCOUNTER — TRANSCRIBE ORDERS (OUTPATIENT)
Dept: CARDIOLOGY | Facility: CLINIC | Age: 81
End: 2021-05-07

## 2021-05-07 ENCOUNTER — TRANSCRIBE ORDERS (OUTPATIENT)
Dept: SLEEP MEDICINE | Facility: HOSPITAL | Age: 81
End: 2021-05-07

## 2021-05-07 ENCOUNTER — APPOINTMENT (OUTPATIENT)
Dept: LAB | Facility: HOSPITAL | Age: 81
End: 2021-05-07

## 2021-05-07 DIAGNOSIS — Z01.810 PRE-OPERATIVE CARDIOVASCULAR EXAMINATION: Primary | ICD-10-CM

## 2021-05-07 DIAGNOSIS — Z01.818 OTHER SPECIFIED PRE-OPERATIVE EXAMINATION: Primary | ICD-10-CM

## 2021-05-07 DIAGNOSIS — Z79.01 LONG TERM (CURRENT) USE OF ANTICOAGULANTS: ICD-10-CM

## 2021-05-07 DIAGNOSIS — Z13.6 SCREENING FOR ISCHEMIC HEART DISEASE: ICD-10-CM

## 2021-05-14 ENCOUNTER — LAB (OUTPATIENT)
Dept: LAB | Facility: HOSPITAL | Age: 81
End: 2021-05-14

## 2021-05-14 ENCOUNTER — APPOINTMENT (OUTPATIENT)
Dept: LAB | Facility: HOSPITAL | Age: 81
End: 2021-05-14

## 2021-05-14 PROCEDURE — U0005 INFEC AGEN DETEC AMPLI PROBE: HCPCS | Performed by: INTERNAL MEDICINE

## 2021-05-14 PROCEDURE — C9803 HOPD COVID-19 SPEC COLLECT: HCPCS | Performed by: INTERNAL MEDICINE

## 2021-05-14 PROCEDURE — U0004 COV-19 TEST NON-CDC HGH THRU: HCPCS | Performed by: INTERNAL MEDICINE

## 2021-05-17 ENCOUNTER — ANTICOAGULATION VISIT (OUTPATIENT)
Dept: PHARMACY | Facility: HOSPITAL | Age: 81
End: 2021-05-17

## 2021-05-17 ENCOUNTER — LAB (OUTPATIENT)
Dept: LAB | Facility: HOSPITAL | Age: 81
End: 2021-05-17

## 2021-05-17 ENCOUNTER — HOSPITAL ENCOUNTER (OUTPATIENT)
Dept: RESPIRATORY THERAPY | Facility: HOSPITAL | Age: 81
Discharge: HOME | End: 2021-05-17

## 2021-05-17 ENCOUNTER — TELEPHONE (OUTPATIENT)
Dept: CARDIOLOGY | Facility: CLINIC | Age: 81
End: 2021-05-17

## 2021-05-17 DIAGNOSIS — Z95.2 H/O AORTIC VALVE REPLACEMENT: Primary | ICD-10-CM

## 2021-05-17 DIAGNOSIS — G47.33 OSA ON CPAP: ICD-10-CM

## 2021-05-17 DIAGNOSIS — Z79.01 LONG TERM (CURRENT) USE OF ANTICOAGULANTS: ICD-10-CM

## 2021-05-17 DIAGNOSIS — Z99.89 OSA ON CPAP: ICD-10-CM

## 2021-05-17 DIAGNOSIS — Z13.6 SCREENING FOR ISCHEMIC HEART DISEASE: ICD-10-CM

## 2021-05-17 DIAGNOSIS — Z01.818 OTHER SPECIFIED PRE-OPERATIVE EXAMINATION: ICD-10-CM

## 2021-05-17 DIAGNOSIS — G47.34 SLEEP RELATED HYPOXIA: ICD-10-CM

## 2021-05-17 DIAGNOSIS — Z01.810 PRE-OPERATIVE CARDIOVASCULAR EXAMINATION: ICD-10-CM

## 2021-05-17 DIAGNOSIS — Z95.2 MECHANICAL HEART VALVE PRESENT: ICD-10-CM

## 2021-05-17 LAB
ANION GAP SERPL CALCULATED.3IONS-SCNC: 11.9 MMOL/L (ref 5–15)
ARTERIAL PATENCY WRIST A: POSITIVE
ATMOSPHERIC PRESS: 757.1 MMHG
BASE EXCESS BLDA CALC-SCNC: 0.2 MMOL/L (ref 0–2)
BASOPHILS # BLD AUTO: 0.05 10*3/MM3 (ref 0–0.2)
BASOPHILS NFR BLD AUTO: 0.5 % (ref 0–1.5)
BDY SITE: ABNORMAL
BDY SITE: NORMAL
BUN SERPL-MCNC: 16 MG/DL (ref 8–23)
BUN/CREAT SERPL: 22.5 (ref 7–25)
CALCIUM SPEC-SCNC: 9.5 MG/DL (ref 8.6–10.5)
CHLORIDE SERPL-SCNC: 105 MMOL/L (ref 98–107)
CO2 SERPL-SCNC: 25.1 MMOL/L (ref 22–29)
CREAT SERPL-MCNC: 0.71 MG/DL (ref 0.76–1.27)
DEPRECATED RDW RBC AUTO: 41.6 FL (ref 37–54)
EOSINOPHIL # BLD AUTO: 0.03 10*3/MM3 (ref 0–0.4)
EOSINOPHIL NFR BLD AUTO: 0.3 % (ref 0.3–6.2)
ERYTHROCYTE [DISTWIDTH] IN BLOOD BY AUTOMATED COUNT: 13.3 % (ref 12.3–15.4)
GFR SERPL CREATININE-BSD FRML MDRD: 106 ML/MIN/1.73
GLUCOSE SERPL-MCNC: 98 MG/DL (ref 65–99)
HCO3 BLDA-SCNC: 25.1 MMOL/L (ref 22–28)
HCT VFR BLD AUTO: 44.5 % (ref 37.5–51)
HGB BLD-MCNC: 14.4 G/DL (ref 13–17.7)
HGB BLDA-MCNC: 14.6 G/DL (ref 12–18)
IMM GRANULOCYTES # BLD AUTO: 0.03 10*3/MM3 (ref 0–0.05)
IMM GRANULOCYTES NFR BLD AUTO: 0.3 % (ref 0–0.5)
INR PPP: 2.57 (ref 0.9–1.1)
LYMPHOCYTES # BLD AUTO: 1.14 10*3/MM3 (ref 0.7–3.1)
LYMPHOCYTES NFR BLD AUTO: 12.4 % (ref 19.6–45.3)
MCH RBC QN AUTO: 28.1 PG (ref 26.6–33)
MCHC RBC AUTO-ENTMCNC: 32.4 G/DL (ref 31.5–35.7)
MCV RBC AUTO: 86.7 FL (ref 79–97)
MODALITY: ABNORMAL
MONOCYTES # BLD AUTO: 0.5 10*3/MM3 (ref 0.1–0.9)
MONOCYTES NFR BLD AUTO: 5.4 % (ref 5–12)
NEUTROPHILS NFR BLD AUTO: 7.46 10*3/MM3 (ref 1.7–7)
NEUTROPHILS NFR BLD AUTO: 81.1 % (ref 42.7–76)
NRBC BLD AUTO-RTO: 0 /100 WBC (ref 0–0.2)
PCO2 BLDA: 40.5 MM HG (ref 35–45)
PH BLDA: 7.4 PH UNITS (ref 7.35–7.45)
PLATELET # BLD AUTO: 250 10*3/MM3 (ref 140–450)
PMV BLD AUTO: 8.9 FL (ref 6–12)
PO2 BLDA: 70.7 MM HG (ref 80–100)
POTASSIUM SERPL-SCNC: 4.3 MMOL/L (ref 3.5–5.2)
PROTHROMBIN TIME: 27.3 SECONDS (ref 11.7–14.2)
RBC # BLD AUTO: 5.13 10*6/MM3 (ref 4.14–5.8)
SAO2 % BLDCOA: 93.9 % (ref 92–99)
SODIUM SERPL-SCNC: 142 MMOL/L (ref 136–145)
TOTAL RATE: 18 BREATHS/MINUTE
WBC # BLD AUTO: 9.21 10*3/MM3 (ref 3.4–10.8)

## 2021-05-17 PROCEDURE — 85025 COMPLETE CBC W/AUTO DIFF WBC: CPT

## 2021-05-17 PROCEDURE — 36415 COLL VENOUS BLD VENIPUNCTURE: CPT

## 2021-05-17 PROCEDURE — 94060 EVALUATION OF WHEEZING: CPT

## 2021-05-17 PROCEDURE — U0004 COV-19 TEST NON-CDC HGH THRU: HCPCS

## 2021-05-17 PROCEDURE — 94726 PLETHYSMOGRAPHY LUNG VOLUMES: CPT

## 2021-05-17 PROCEDURE — 36600 WITHDRAWAL OF ARTERIAL BLOOD: CPT

## 2021-05-17 PROCEDURE — 85610 PROTHROMBIN TIME: CPT

## 2021-05-17 PROCEDURE — U0005 INFEC AGEN DETEC AMPLI PROBE: HCPCS

## 2021-05-17 PROCEDURE — 94729 DIFFUSING CAPACITY: CPT

## 2021-05-17 PROCEDURE — C9803 HOPD COVID-19 SPEC COLLECT: HCPCS

## 2021-05-17 PROCEDURE — 82803 BLOOD GASES ANY COMBINATION: CPT

## 2021-05-17 PROCEDURE — 80048 BASIC METABOLIC PNL TOTAL CA: CPT

## 2021-05-17 PROCEDURE — 82820 HEMOGLOBIN-OXYGEN AFFINITY: CPT | Performed by: INTERNAL MEDICINE

## 2021-05-17 PROCEDURE — 94640 AIRWAY INHALATION TREATMENT: CPT

## 2021-05-17 RX ORDER — ALBUTEROL SULFATE 2.5 MG/3ML
2.5 SOLUTION RESPIRATORY (INHALATION) ONCE
Status: COMPLETED | OUTPATIENT
Start: 2021-05-17 | End: 2021-05-17

## 2021-05-17 RX ADMIN — ALBUTEROL SULFATE 2.5 MG: 2.5 SOLUTION RESPIRATORY (INHALATION) at 10:51

## 2021-05-17 NOTE — TELEPHONE ENCOUNTER
Called and spoke with . Barbara Tapley. She was aware that he needed to hold his warfarin today (5/17/2021) and tomorrow (5/18/2021) in preparation for his cath on 5/19/2021 and resume the day after his cath. Informed them that his pre cath labs look good and his INR was 2.57. She verbalized understanding.

## 2021-05-17 NOTE — PROGRESS NOTES
Anticoagulation Clinic Progress Note    Anticoagulation Summary  As of 2021    INR goal:  2.5-3.5   TTR:  92.6 % (2.4 y)   INR used for dosin.57 (2021)   Warfarin maintenance plan:  7.5 mg every Wed; 5 mg all other days   Weekly warfarin total:  37.5 mg   Plan last modified:  Layla Ortega, [facility] (2019)   Next INR check:  2021   Priority:  High   Target end date:  Indefinite    Indications    H/O aortic valve replacement [Z95.2]  Mechanical heart valve present [Z95.2]             Anticoagulation Episode Summary     INR check location:  Home Draw    Preferred lab:      Send INR reminders to:   [facility]  POOL    Comments:  **[facility]** **Call on 21 - restarting warfarin post-op**      Anticoagulation Care Providers     Provider Role Specialty Phone number    Donya Gagnon MD Referring Cardiology [phone]          Clinic Interview:  Patient Findings     Positives:  Upcoming invasive procedure    Negatives:  Signs/symptoms of thrombosis, Signs/symptoms of bleeding,   Laboratory test error suspected, Change in health, Change in alcohol use,   Change in activity, Emergency department visit, Upcoming dental procedure,   Missed doses, Extra doses, Change in medications, Change in diet/appetite,   Hospital admission, Bruising, Other complaints    Comments:  Coronary angiography 21; reports he has already been   instructed to hold x 2 days prior.       Clinical Outcomes     Negatives:  Major bleeding event, Thromboembolic event,   Anticoagulation-related hospital admission, Anticoagulation-related ED   visit, Anticoagulation-related fatality    Comments:  Coronary angiography 21; reports he has already been   instructed to hold x 2 days prior.         INR History:  Anticoagulation Monitoring 2021   INR 3.50 3.00 2.57   INR Date 2021   INR Goal 2.5-3.5 2.5-3.5 2.5-3.5   Trend Same Same Same   Last  Week Total 37.5 mg 37.5 mg 37.5 mg   Next Week Total 37.5 mg 37.5 mg 27.5 mg   Sun 5 mg 5 mg -   Mon 5 mg 5 mg Hold (5/17)   Tue 5 mg 5 mg Hold (5/18)   Wed 7.5 mg 7.5 mg -   Thu 5 mg 5 mg -   Fri 5 mg 5 mg -   Sat 5 mg 5 mg -   Visit Report - - -   Some recent data might be hidden       Plan:  1. INR is Therapeutic today- see above in Anticoagulation Summary.   Will instruct Bobby L Tapley to Change their warfarin regimen- see above in Anticoagulation Summary.  2. Follow up in 2 days with home test to facilitate warfarin resumption post-op.   3. They have been instructed to call if any changes in medications, doses, concerns, etc. Patient expresses understanding and has no further questions at this time.    Regis Chu [facility]

## 2021-05-18 LAB — SARS-COV-2 ORF1AB RESP QL NAA+PROBE: NOT DETECTED

## 2021-05-19 ENCOUNTER — ANTICOAGULATION VISIT (OUTPATIENT)
Dept: PHARMACY | Facility: HOSPITAL | Age: 81
End: 2021-05-19

## 2021-05-19 ENCOUNTER — HOSPITAL ENCOUNTER (OUTPATIENT)
Facility: HOSPITAL | Age: 81
Setting detail: HOSPITAL OUTPATIENT SURGERY
Discharge: HOME | End: 2021-05-19
Attending: INTERNAL MEDICINE | Admitting: INTERNAL MEDICINE

## 2021-05-19 VITALS
BODY MASS INDEX: 22.4 KG/M2 | DIASTOLIC BLOOD PRESSURE: 72 MMHG | OXYGEN SATURATION: 95 % | HEART RATE: 69 BPM | WEIGHT: 160 LBS | SYSTOLIC BLOOD PRESSURE: 107 MMHG | RESPIRATION RATE: 16 BRPM | HEIGHT: 71 IN | TEMPERATURE: 98.8 F

## 2021-05-19 DIAGNOSIS — I71.22 AORTIC ARCH ANEURYSM (HCC): ICD-10-CM

## 2021-05-19 DIAGNOSIS — Z95.2 MECHANICAL HEART VALVE PRESENT: ICD-10-CM

## 2021-05-19 DIAGNOSIS — I25.10 CORONARY ARTERY DISEASE INVOLVING NATIVE CORONARY ARTERY OF NATIVE HEART WITHOUT ANGINA PECTORIS: ICD-10-CM

## 2021-05-19 DIAGNOSIS — Z95.2 H/O AORTIC VALVE REPLACEMENT: Primary | ICD-10-CM

## 2021-05-19 LAB
INR PPP: 1.4 (ref 0.9–1.1)
PROTHROMBIN TIME: 16.3 SECONDS

## 2021-05-19 PROCEDURE — 93455 CORONARY ART/GRFT ANGIO S&I: CPT | Performed by: INTERNAL MEDICINE

## 2021-05-19 PROCEDURE — C1894 INTRO/SHEATH, NON-LASER: HCPCS | Performed by: INTERNAL MEDICINE

## 2021-05-19 PROCEDURE — C1769 GUIDE WIRE: HCPCS | Performed by: INTERNAL MEDICINE

## 2021-05-19 PROCEDURE — 0 IOPAMIDOL PER 1 ML: Performed by: INTERNAL MEDICINE

## 2021-05-19 PROCEDURE — 25010000002 MIDAZOLAM PER 1 MG: Performed by: INTERNAL MEDICINE

## 2021-05-19 PROCEDURE — 99153 MOD SED SAME PHYS/QHP EA: CPT | Performed by: INTERNAL MEDICINE

## 2021-05-19 PROCEDURE — 99152 MOD SED SAME PHYS/QHP 5/>YRS: CPT | Performed by: INTERNAL MEDICINE

## 2021-05-19 PROCEDURE — 25010000002 HEPARIN (PORCINE) PER 1000 UNITS: Performed by: INTERNAL MEDICINE

## 2021-05-19 PROCEDURE — 85610 PROTHROMBIN TIME: CPT

## 2021-05-19 PROCEDURE — 25010000002 FENTANYL CITRATE (PF) 50 MCG/ML SOLUTION: Performed by: INTERNAL MEDICINE

## 2021-05-19 RX ORDER — SODIUM CHLORIDE 0.9 % (FLUSH) 0.9 %
10 SYRINGE (ML) INJECTION AS NEEDED
Status: DISCONTINUED | OUTPATIENT
Start: 2021-05-19 | End: 2021-05-19 | Stop reason: HOSPADM

## 2021-05-19 RX ORDER — SODIUM CHLORIDE 0.9 % (FLUSH) 0.9 %
3 SYRINGE (ML) INJECTION EVERY 12 HOURS SCHEDULED
Status: DISCONTINUED | OUTPATIENT
Start: 2021-05-19 | End: 2021-05-19 | Stop reason: HOSPADM

## 2021-05-19 RX ORDER — LIDOCAINE HYDROCHLORIDE 20 MG/ML
INJECTION, SOLUTION INFILTRATION; PERINEURAL AS NEEDED
Status: DISCONTINUED | OUTPATIENT
Start: 2021-05-19 | End: 2021-05-19 | Stop reason: HOSPADM

## 2021-05-19 RX ORDER — ALENDRONATE SODIUM 70 MG/1
70 TABLET ORAL
COMMUNITY
End: 2021-10-27 | Stop reason: HOSPADM

## 2021-05-19 RX ORDER — FENTANYL CITRATE 50 UG/ML
INJECTION, SOLUTION INTRAMUSCULAR; INTRAVENOUS AS NEEDED
Status: DISCONTINUED | OUTPATIENT
Start: 2021-05-19 | End: 2021-05-19 | Stop reason: HOSPADM

## 2021-05-19 RX ORDER — ACETAMINOPHEN 325 MG/1
650 TABLET ORAL EVERY 4 HOURS PRN
Status: DISCONTINUED | OUTPATIENT
Start: 2021-05-19 | End: 2021-05-19 | Stop reason: HOSPADM

## 2021-05-19 RX ORDER — SODIUM CHLORIDE 9 MG/ML
75 INJECTION, SOLUTION INTRAVENOUS CONTINUOUS
Status: DISCONTINUED | OUTPATIENT
Start: 2021-05-19 | End: 2021-05-19 | Stop reason: HOSPADM

## 2021-05-19 RX ORDER — MIDAZOLAM HYDROCHLORIDE 1 MG/ML
INJECTION INTRAMUSCULAR; INTRAVENOUS AS NEEDED
Status: DISCONTINUED | OUTPATIENT
Start: 2021-05-19 | End: 2021-05-19 | Stop reason: HOSPADM

## 2021-05-19 RX ADMIN — SODIUM CHLORIDE 75 ML/HR: 9 INJECTION, SOLUTION INTRAVENOUS at 08:55

## 2021-05-19 NOTE — PROGRESS NOTES
Anticoagulation Clinic Progress Note    Anticoagulation Summary  As of 2021    INR goal:  2.5-3.5   TTR:  92.4 % (2.4 y)   INR used for dosin.4 (2021)   Warfarin maintenance plan:  7.5 mg every Wed; 5 mg all other days   Weekly warfarin total:  37.5 mg   Plan last modified:  Layla Ortega, [facility] (2019)   Next INR check:  2021   Priority:  High   Target end date:  Indefinite    Indications    H/O aortic valve replacement [Z95.2]  Mechanical heart valve present [Z95.2]             Anticoagulation Episode Summary     INR check location:  Home Draw    Preferred lab:      Send INR reminders to:   [facility]  POOL    Comments:  **[facility]** **Call on 21 - restarting warfarin post-op**      Anticoagulation Care Providers     Provider Role Specialty Phone number    Donya Gagnon MD Referring Cardiology [phone]          Clinic Interview:  Patient Findings     Negatives:  Signs/symptoms of thrombosis, Signs/symptoms of bleeding,   Laboratory test error suspected, Change in health, Change in alcohol use,   Change in activity, Upcoming invasive procedure, Emergency department   visit, Upcoming dental procedure, Missed doses, Extra doses, Change in   medications, Change in diet/appetite, Hospital admission, Bruising, Other   complaints      Clinical Outcomes     Negatives:  Major bleeding event, Thromboembolic event,   Anticoagulation-related hospital admission, Anticoagulation-related ED   visit, Anticoagulation-related fatality        INR History:  Anticoagulation Monitoring 2021   INR 3.00 2.57 1.4   INR Date 2021   INR Goal 2.5-3.5 2.5-3.5 2.5-3.5   Trend Same Same Same   Last Week Total 37.5 mg 37.5 mg 27.5 mg   Next Week Total 37.5 mg 27.5 mg 37.5 mg   Sun 5 mg - 5 mg   Mon 5 mg Hold () -   Tue 5 mg Hold () -   Wed 7.5 mg - Hold ()   Thu 5 mg - 10 mg ()   Fri 5 mg - 7.5 mg ()   Sat 5  mg - 5 mg   Visit Report - - -   Some recent data might be hidden       Plan:  1. INR is Subtherapeutic today- see above in Anticoagulation Summary.   Will instruct Bobby L Tapley to hold warfarin today (wife reports she was told not give any warfarin after today's procedure) then give 10 mg of warfarin on 5/20, 7.5 mg of warfarin on 5/21, then resume his normal regimen- see above in Anticoagulation Summary.  2. Follow up in 5 days  3.  Pt has agreed to only be called if INR out of range. They have been instructed to call if any changes in medications, doses, concerns, etc. Patient expresses understanding and has no further questions at this time.    Azar Cruz [facility]

## 2021-05-20 LAB
INR PPP: 1.4 (ref 0.8–1.2)
PROTHROMBIN TIME: 16.3 SECONDS (ref 12.8–15.2)

## 2021-05-25 LAB — INR PPP: 2.6

## 2021-05-26 ENCOUNTER — ANTICOAGULATION VISIT (OUTPATIENT)
Dept: PHARMACY | Facility: HOSPITAL | Age: 81
End: 2021-05-26

## 2021-05-26 DIAGNOSIS — Z95.2 MECHANICAL HEART VALVE PRESENT: ICD-10-CM

## 2021-05-26 DIAGNOSIS — Z95.2 H/O AORTIC VALVE REPLACEMENT: Primary | ICD-10-CM

## 2021-05-26 NOTE — PROGRESS NOTES
Anticoagulation Clinic Progress Note    Anticoagulation Summary  As of 2021    INR goal:  2.5-3.5   TTR:  91.9 % (2.4 y)   INR used for dosin.60 (2021)   Warfarin maintenance plan:  7.5 mg every Wed; 5 mg all other days   Weekly warfarin total:  37.5 mg   No change documented:  Gali Pettit [facility]   Plan last modified:  Layla Ortega [facility] (2019)   Next INR check:  2021   Priority:  High   Target end date:  Indefinite    Indications    H/O aortic valve replacement [Z95.2]  Mechanical heart valve present [Z95.2]             Anticoagulation Episode Summary     INR check location:  Home Draw    Preferred lab:      Send INR reminders to:   [facility]  POOL    Comments:  **[facility]** **Call on 21 - restarting warfarin post-op**      Anticoagulation Care Providers     Provider Role Specialty Phone number    Donya Gagnon MD Referring Cardiology [phone]          Clinic Interview:  Patient Findings     Negatives:  Signs/symptoms of thrombosis, Signs/symptoms of bleeding,   Laboratory test error suspected, Change in health, Change in alcohol use,   Change in activity, Upcoming invasive procedure, Emergency department   visit, Upcoming dental procedure, Missed doses, Extra doses, Change in   medications, Change in diet/appetite, Hospital admission, Bruising, Other   complaints      Clinical Outcomes     Negatives:  Major bleeding event, Thromboembolic event,   Anticoagulation-related hospital admission, Anticoagulation-related ED   visit, Anticoagulation-related fatality        INR History:  Anticoagulation Monitoring 2021   INR 2.57 1.4 2.60   INR Date 2021   INR Goal 2.5-3.5 2.5-3.5 2.5-3.5   Trend Same Same Same   Last Week Total 37.5 mg 27.5 mg 37.5 mg   Next Week Total 27.5 mg 37.5 mg 37.5 mg   Sun - 5 mg 5 mg   Mon Hold () - 5 mg   Tue Hold () - 5 mg   Wed - Hold () 7.5 mg   Thu - 10 mg  (5/20) 5 mg   Fri - 7.5 mg (5/21) 5 mg   Sat - 5 mg 5 mg   Visit Report - - -   Some recent data might be hidden       Plan:  1. INR is Therapeutic today- see above in Anticoagulation Summary.   Will instruct Song WESTBROOK Tapley to Continue their warfarin regimen- see above in Anticoagulation Summary.  2. Follow up in 2 weeks  3. They have been instructed to call if any changes in medications, doses, concerns, etc. Patient expresses understanding and has no further questions at this time.    Gali Pettit [facility]

## 2021-06-08 ENCOUNTER — ANTICOAGULATION VISIT (OUTPATIENT)
Dept: PHARMACY | Facility: HOSPITAL | Age: 81
End: 2021-06-08

## 2021-06-08 DIAGNOSIS — Z95.2 H/O AORTIC VALVE REPLACEMENT: Primary | ICD-10-CM

## 2021-06-08 DIAGNOSIS — Z95.2 MECHANICAL HEART VALVE PRESENT: ICD-10-CM

## 2021-06-08 LAB — INR PPP: 3.7

## 2021-06-08 NOTE — PROGRESS NOTES
Anticoagulation Clinic Progress Note    Anticoagulation Summary  As of 6/8/2021    INR goal:  2.5-3.5   TTR:  91.7 % (2.5 y)   INR used for dosing:  3.70 (6/8/2021)   Warfarin maintenance plan:  2.5 mg every Tue; 5 mg all other days   Weekly warfarin total:  32.5 mg   Plan last modified:  Layla Ortega, [facility] (6/8/2021)   Next INR check:  6/15/2021   Priority:  High   Target end date:  Indefinite    Indications    H/O aortic valve replacement [Z95.2]  Mechanical heart valve present [Z95.2]             Anticoagulation Episode Summary     INR check location:  Home Draw    Preferred lab:      Send INR reminders to:  [facility]  POOL    Comments:  **[facility]** **Call on 5/19/21 - restarting warfarin post-op**      Anticoagulation Care Providers     Provider Role Specialty Phone number    Donya Gagnon MD Referring Cardiology [phone]          Clinic Interview:  Started Tumeric + Christina.     INR History:  Anticoagulation Monitoring 5/19/2021 5/26/2021 6/8/2021   INR 1.4 2.60 3.70   INR Date 5/19/2021 5/25/2021 6/8/2021   INR Goal 2.5-3.5 2.5-3.5 2.5-3.5   Trend Same Same Down   Last Week Total 27.5 mg 37.5 mg 35 mg   Next Week Total 37.5 mg 37.5 mg 32.5 mg   Sun 5 mg 5 mg 5 mg   Mon - 5 mg 5 mg   Tue - 5 mg 2.5 mg (6/8)   Wed Hold (5/19) 7.5 mg 5 mg   Thu 10 mg (5/20) 5 mg 5 mg   Fri 7.5 mg (5/21) 5 mg 5 mg   Sat 5 mg 5 mg 5 mg   Visit Report - - -   Some recent data might be hidden       Plan:  1. INR is supratherapeutic today- see above in Anticoagulation Summary. Spoke with Bobby L Tapley, instructed to decrease Tuesday's warfarin dose to 2.5mg - see above in Anticoagulation Summary.  2. Follow up in 1 week  3. Pt has agreed to only be called if INR out of range. They have been instructed to call if any changes in medications, doses, concerns, etc. Patient expresses understanding and has no further questions at this time.    Layla Ortega [facility]

## 2021-06-15 LAB — INR PPP: 2.5

## 2021-06-16 ENCOUNTER — ANTICOAGULATION VISIT (OUTPATIENT)
Dept: PHARMACY | Facility: HOSPITAL | Age: 81
End: 2021-06-16

## 2021-06-16 DIAGNOSIS — Z95.2 MECHANICAL HEART VALVE PRESENT: ICD-10-CM

## 2021-06-16 DIAGNOSIS — Z95.2 H/O AORTIC VALVE REPLACEMENT: Primary | ICD-10-CM

## 2021-06-16 NOTE — PROGRESS NOTES
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]             Anticoagulation Episode Summary     INR check location:  Home Draw    Preferred lab:      Send INR reminders to:   [facility]  [facility]    Comments:  **[facility]** **Call on 21 - restarting warfarin post-op**      Anticoagulation Care Providers     Provider Role Specialty Phone number    Donya Gagnon MD Referring Cardiology [phone]        Clinic Interview:  Patient Findings     Negatives:  Signs/symptoms of thrombosis, Signs/symptoms of bleeding,   Laboratory test error suspected, Change in health, Change in alcohol use,   Change in activity, Upcoming invasive procedure, Emergency department   visit, Upcoming dental procedure, Missed doses, Extra doses, Change in   medications, Change in diet/appetite, Hospital admission, Bruising, Other   complaints    Comments:  S/W wife Tonya.  Confirms compliance with regimen change   made last visit      Clinical Outcomes     Negatives:  Major bleeding event, Thromboembolic event,   Anticoagulation-related hospital admission, Anticoagulation-related ED   visit, Anticoagulation-related fatality      INR History:  Anticoagulation Monitoring 2021   INR 2.60 3.70 2.50   INR Date 2021 2021 6/15/2021   INR Goal 2.5-3.5 2.5-3.5 2.5-3.5   Trend Same Down Same   Last Week Total 37.5 mg 35 mg 32.5 mg   Next Week Total 37.5 mg 32.5 mg 32.5 mg   Sun 5 mg 5 mg 5 mg    Mon 5 mg 5 mg 5 mg   Tue 5 mg 2.5 mg (6/8) -   Wed 7.5 mg 5 mg 5 mg   Thu 5 mg 5 mg 5 mg   Fri 5 mg 5 mg 5 mg   Sat 5 mg 5 mg 5 mg   Visit Report - - -   Some recent data might be hidden     Plan:  1. INR is Therapeutic today- see above in Anticoagulation Summary.   Will instruct Bobby L Tapley via Wife Tonya to Continue their warfarin regimen- see above in Anticoagulation Summary.  2. Follow up in 1 week  3. They have been instructed to call if any changes in medications, doses, concerns, etc. Patient expresses understanding and has no further questions at this time.    Leonardo Bansal, PharmD

## 2021-06-30 ENCOUNTER — ANTICOAGULATION VISIT (OUTPATIENT)
Dept: PHARMACY | Facility: HOSPITAL | Age: 81
End: 2021-06-30

## 2021-06-30 DIAGNOSIS — Z95.2 MECHANICAL HEART VALVE PRESENT: ICD-10-CM

## 2021-06-30 DIAGNOSIS — Z95.2 H/O AORTIC VALVE REPLACEMENT: Primary | ICD-10-CM

## 2021-06-30 LAB — INR PPP: 3

## 2021-06-30 NOTE — PROGRESS NOTES
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    INR History:  Anticoagulation Monitoring 6/8/2021 6/16/2021 6/30/2021   INR 3.70 2.50 3.00   INR Date 6/8/2021 6/15/2021 6/30/2021   INR Goal 2.5-3.5 2.5-3.5 2.5-3.5   Trend Down Same Same   Last Week Total 35 mg 32.5 mg 32.5 mg   Next Week Total 32.5 mg 32.5 mg 32.5 mg   Sun 5 mg 5 mg 5 mg   Mon 5 mg 5 mg 5 mg   Tue 2.5 mg (6/8) - 2.5 mg   Wed 5 mg 5 mg 5 mg   Thu 5 mg 5 mg 5 mg   Fri 5 mg 5 mg 5 mg   Sat 5 mg 5 mg 5 mg   Visit Report - - -   Some recent data might be hidden       Plan:  1. INR is therapeutic today- see above in Anticoagulation Summary.    Bobby L Tapley to continue their warfarin regimen- see above in Anticoagulation Summary.  2. Follow up in 2 weeks  3. Pt has agreed to only be called if INR out of range. They have been instructed to call if any changes in medications, doses, concerns, etc. Patient expresses understanding and has no further questions at this time.    Kayleen Rodriguez

## 2021-07-14 ENCOUNTER — ANTICOAGULATION VISIT (OUTPATIENT)
Dept: PHARMACY | Facility: HOSPITAL | Age: 81
End: 2021-07-14

## 2021-07-14 DIAGNOSIS — Z95.2 H/O AORTIC VALVE REPLACEMENT: Primary | ICD-10-CM

## 2021-07-14 DIAGNOSIS — Z95.2 MECHANICAL HEART VALVE PRESENT: ICD-10-CM

## 2021-07-14 LAB — INR PPP: 3.2

## 2021-07-14 NOTE — PROGRESS NOTES
Anticoagulation Clinic Progress Note    Anticoagulation Summary  As of 7/14/2021    INR goal:  2.5-3.5   TTR:  91.9 % (2.6 y)   INR used for dosing:  3.20 (7/14/2021)   Warfarin maintenance plan:  2.5 mg every Tue; 5 mg all other days   Weekly warfarin total:  32.5 mg   No change documented:  Amparo Echeverria   Plan last modified:  Layla Ortega, 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    INR History:  Anticoagulation Monitoring 6/16/2021 6/30/2021 7/14/2021   INR 2.50 3.00 3.20   INR Date 6/15/2021 6/30/2021 7/14/2021   INR Goal 2.5-3.5 2.5-3.5 2.5-3.5   Trend Same Same Same   Last Week Total 32.5 mg 32.5 mg 32.5 mg   Next Week Total 32.5 mg 32.5 mg 32.5 mg   Sun 5 mg 5 mg 5 mg   Mon 5 mg 5 mg 5 mg   Tue - 2.5 mg 2.5 mg   Wed 5 mg 5 mg 5 mg   Thu 5 mg 5 mg 5 mg   Fri 5 mg 5 mg 5 mg   Sat 5 mg 5 mg 5 mg   Visit Report - - -   Some recent data might be hidden       Plan:  1. INR is therapeutic today- see above in Anticoagulation Summary.    Bobby L Tapley to continue their warfarin regimen- see above in Anticoagulation Summary.  2. Follow up in 2 weeks  3. Pt has agreed to only be called if INR out of range. They have been instructed to call if any changes in medications, doses, concerns, etc. Patient expresses understanding and has no further questions at this time.    Amparo Echeverria

## 2021-07-28 ENCOUNTER — ANTICOAGULATION VISIT (OUTPATIENT)
Dept: PHARMACY | Facility: HOSPITAL | Age: 81
End: 2021-07-28

## 2021-07-28 DIAGNOSIS — Z95.2 MECHANICAL HEART VALVE PRESENT: ICD-10-CM

## 2021-07-28 DIAGNOSIS — Z95.2 H/O AORTIC VALVE REPLACEMENT: Primary | ICD-10-CM

## 2021-07-28 LAB — INR PPP: 2.6

## 2021-07-28 NOTE — PROGRESS NOTES
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    INR History:  Anticoagulation Monitoring 2021   INR 3.00 3.20 2.60   INR Date 2021   INR Goal 2.5-3.5 2.5-3.5 2.5-3.5   Trend Same Same Same   Last Week Total 32.5 mg 32.5 mg 32.5 mg   Next Week Total 32.5 mg 32.5 mg 32.5 mg   Sun 5 mg 5 mg 5 mg   Mon 5 mg 5 mg 5 mg   Tue 2.5 mg 2.5 mg 2.5 mg   Wed 5 mg 5 mg 5 mg   Thu 5 mg 5 mg 5 mg   Fri 5 mg 5 mg 5 mg   Sat 5 mg 5 mg 5 mg   Visit Report - - -   Some recent data might be hidden       Plan:  1. INR is therapeutic today- see above in Anticoagulation Summary.    Bobby L Tapley to continue their warfarin regimen- see above in Anticoagulation Summary.  2. Follow up in 2 weeks  3. Pt has agreed to only be called if INR out of range. They have been instructed to call if any changes in medications, doses, concerns, etc. Patient expresses understanding and has no further questions at this time.    Laurie Harvey

## 2021-08-12 ENCOUNTER — ANTICOAGULATION VISIT (OUTPATIENT)
Dept: PHARMACY | Facility: HOSPITAL | Age: 81
End: 2021-08-12

## 2021-08-12 DIAGNOSIS — Z95.2 H/O AORTIC VALVE REPLACEMENT: Primary | ICD-10-CM

## 2021-08-12 DIAGNOSIS — Z95.2 MECHANICAL HEART VALVE PRESENT: ICD-10-CM

## 2021-08-12 LAB — INR PPP: 2.6

## 2021-08-12 NOTE — PROGRESS NOTES
Anticoagulation Clinic Progress Note    Anticoagulation Summary  As of 2021    INR goal:  2.5-3.5   TTR:  92.1 % (2.7 y)   INR used for dosin.60 (2021)   Warfarin maintenance plan:  2.5 mg every Tue; 5 mg all other days   Weekly warfarin total:  32.5 mg   No change documented:  Laurie Harvey   Plan last modified:  Layla Ortega, 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    INR History:  Anticoagulation Monitoring 2021   INR 3.20 2.60 2.60   INR Date 2021   INR Goal 2.5-3.5 2.5-3.5 2.5-3.5   Trend Same Same Same   Last Week Total 32.5 mg 32.5 mg 32.5 mg   Next Week Total 32.5 mg 32.5 mg 32.5 mg   Sun 5 mg 5 mg 5 mg   Mon 5 mg 5 mg 5 mg   Tue 2.5 mg 2.5 mg 2.5 mg   Wed 5 mg 5 mg 5 mg   Thu 5 mg 5 mg 5 mg   Fri 5 mg 5 mg 5 mg   Sat 5 mg 5 mg 5 mg   Visit Report - - -   Some recent data might be hidden       Plan:  1. INR is therapeutic today- see above in Anticoagulation Summary.    Bobby L Tapley to continue their warfarin regimen- see above in Anticoagulation Summary.  2. Follow up in 2 weeks  3. Pt has agreed to only be called if INR out of range. They have been instructed to call if any changes in medications, doses, concerns, etc. Patient expresses understanding and has no further questions at this time.    Laurie Harvey

## 2021-08-25 ENCOUNTER — OFFICE VISIT (OUTPATIENT)
Dept: CARDIOLOGY | Facility: CLINIC | Age: 81
End: 2021-08-25

## 2021-08-25 ENCOUNTER — ANTICOAGULATION VISIT (OUTPATIENT)
Dept: PHARMACY | Facility: HOSPITAL | Age: 81
End: 2021-08-25

## 2021-08-25 VITALS
SYSTOLIC BLOOD PRESSURE: 122 MMHG | BODY MASS INDEX: 21.08 KG/M2 | DIASTOLIC BLOOD PRESSURE: 80 MMHG | HEIGHT: 71 IN | WEIGHT: 150.6 LBS | HEART RATE: 80 BPM

## 2021-08-25 DIAGNOSIS — I71.22 AORTIC ARCH ANEURYSM (HCC): Primary | ICD-10-CM

## 2021-08-25 DIAGNOSIS — I10 ESSENTIAL HYPERTENSION: ICD-10-CM

## 2021-08-25 DIAGNOSIS — G47.33 OBSTRUCTIVE SLEEP APNEA: ICD-10-CM

## 2021-08-25 DIAGNOSIS — R06.09 DYSPNEA ON EXERTION: ICD-10-CM

## 2021-08-25 DIAGNOSIS — Z95.2 H/O AORTIC VALVE REPLACEMENT: ICD-10-CM

## 2021-08-25 DIAGNOSIS — Z95.2 MECHANICAL HEART VALVE PRESENT: ICD-10-CM

## 2021-08-25 DIAGNOSIS — Z95.2 H/O AORTIC VALVE REPLACEMENT: Primary | ICD-10-CM

## 2021-08-25 DIAGNOSIS — I25.10 CORONARY ARTERY DISEASE INVOLVING NATIVE CORONARY ARTERY OF NATIVE HEART WITHOUT ANGINA PECTORIS: ICD-10-CM

## 2021-08-25 LAB — INR PPP: 2.8

## 2021-08-25 PROCEDURE — 93000 ELECTROCARDIOGRAM COMPLETE: CPT | Performed by: NURSE PRACTITIONER

## 2021-08-25 PROCEDURE — 99214 OFFICE O/P EST MOD 30 MIN: CPT | Performed by: NURSE PRACTITIONER

## 2021-08-25 RX ORDER — VIT C/B6/B5/MAGNESIUM/HERB 173 50-5-6-5MG
CAPSULE ORAL
Status: ON HOLD | COMMUNITY
End: 2021-10-09

## 2021-08-25 RX ORDER — LEFLUNOMIDE 10 MG/1
10 TABLET ORAL DAILY
COMMUNITY
Start: 2021-07-07

## 2021-08-25 RX ORDER — SENNA PLUS 8.6 MG/1
2 TABLET ORAL
COMMUNITY

## 2021-08-25 NOTE — PROGRESS NOTES
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    INR History:  Anticoagulation Monitoring 2021   INR 2.60 2.60 2.80   INR Date 2021   INR Goal 2.5-3.5 2.5-3.5 2.5-3.5   Trend Same Same Same   Last Week Total 32.5 mg 32.5 mg 32.5 mg   Next Week Total 32.5 mg 32.5 mg 32.5 mg   Sun 5 mg 5 mg 5 mg   Mon 5 mg 5 mg 5 mg   Tue 2.5 mg 2.5 mg 2.5 mg   Wed 5 mg 5 mg 5 mg   Thu 5 mg 5 mg 5 mg   Fri 5 mg 5 mg 5 mg   Sat 5 mg 5 mg 5 mg   Visit Report - - -   Some recent data might be hidden       Plan:  1. INR is therapeutic today- see above in Anticoagulation Summary.    Bobby L Tapley to continue their warfarin regimen- see above in Anticoagulation Summary.  2. Follow up in 2 weeks  3. Pt has agreed to only be called if INR out of range. They have been instructed to call if any changes in medications, doses, concerns, etc. Patient expresses understanding and has no further questions at this time.    Kayleen Rodriguez

## 2021-08-25 NOTE — PROGRESS NOTES
Date of Office Visit: 2021  Encounter Provider: ABBY Lorenzo  Place of Service: [facility] CARDIOLOGY  Patient Name: Bobby L Tapley  :1940  Primary Cardiologist: Dr. Donya Gagnon     Chief Complaint   Patient presents with   • Coronary Artery Disease   • Follow-up       HPI: Bobby L Tapley is a pleasant 81 y.o. male who presents today for annual cardiac follow-up for valvular heart disease and coronary artery disease.  I have reviewed his medical records.    In , he was diagnosed with aortic stenosis and underwent mechanical aortic valve replacement, Bentall procedure, transverse thoracic aorta repair, and CABG.  In 2012, he had a repeat catheterization which showed an occluded saphenous vein graft to the LAD.  In 2015, he had an echocardiogram showed normal functioning mechanical aortic valve.  He has had numerous echocardiograms over the years.  His last echocardiogram was completed in 2019 with the following results: LVEF 62%, normal functioning aortic valve, mitral valve regurgitation and calcification, and mild dilation of the ascending aorta.    In 2019, he was having chest pain and had a mildly abnormal stress test.  He was recommended to undergo cardiac catheterization.  When he met with Dr. Michele Dunn the interventionalists that morning he denied symptoms of chest pain or shortness of breath.  Medical treatment was recommended and cardiac catheterization was canceled.  He followed up in 2019 and continued to experience chest pain and his beta-blocker was increased.  In 2019, CTA of the chest showed ascending thoracic aorta postoperative changes and aortic root unchanged and stable.    In 2021, the patient saw Dr. Cox and was recommended to undergo aortic surgery.  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  The aneurysm is over 6.5  rocio and has been recommended to undergo surgery.  There was discussion between Dr. Gagnon and Dr. Cox and a perioperative coronary angiogram was arranged.  In May 2021, he underwent coronary angiography: Normal left main, mid LAD mild irregularities, with 10-30% calcification just after where the occluded vein graft was, left circumflex mild irregularities, RCA mild irregularities, and SVG to LAD occluded.  Medical treatment recommended for the coronary artery disease.  Dr. Herbert said he has a severely dilated aortic root and can proceed with aortic root surgery.    Today is a routine follow-up visit and his wife is accompanying him.  He has been on steroids for 2 years because of rheumatoid arthritis.  He was recommended to wean off the steroids before surgery and has been off of them now 2 to 3 weeks.  They have not heard from Dr. Cox's office and have urged them to call the office today.  His wife says Dr. Cox wanted to do the surgery sometime in August.  His biggest complaint is rheumatoid arthritis pain and swelling of his fingers, hands, and feet.  He reports mild dyspnea with exertion, coughing, and wheezing.  He denies chest pressure, palpitations, edema, syncope, or bleeding.  Occasionally he reports some mild dizziness.  He checks his INRs at home and they are sent to the [facility].  Blood pressure and heart rate are normal today.      Past Medical History:   Diagnosis Date   • Aneurysm of aortic arch (CMS/HCC)    • Aortic insufficiency    • Aortic stenosis    • Arthritis    • Ascending aortic aneurysm (CMS/HCC)    • CAD (coronary artery disease)    • Enlarged prostate    • H/O aortic valve replacement    • H/O echocardiogram 02/10/2015   • Health care maintenance    • History of EKG 07/07/2015   • Hyperlipidemia    • Hypertension    • Kidney stones    • JOY on CPAP    • Precordial pain    • Shortness of breath        Past Surgical History:   Procedure Laterality Date   •  AORTIC VALVE REPAIR/REPLACEMENT     • CARDIAC CATHETERIZATION  11/27/2013   • CARDIAC CATHETERIZATION N/A 5/19/2021    Procedure: Coronary angiography;  Surgeon: Radha Herbert MD;  Location: [facility] CATH INVASIVE LOCATION;  Service: Cardiovascular;  Laterality: N/A;   • COLONOSCOPY N/A 8/15/2017    Procedure: COLONOSCOPY;  Surgeon: Hu Bergman MD;  Location: [facility] ENDOSCOPY;  Service:    • CORONARY ARTERY BYPASS GRAFT     • ROTATOR CUFF REPAIR         Social History     Socioeconomic History   • Marital status:      Spouse name: Not on file   • Number of children: Not on file   • Years of education: Not on file   • Highest education level: Not on file   Tobacco Use   • Smoking status: Never Smoker   • Smokeless tobacco: Never Used   Vaping Use   • Vaping Use: Never used   Substance and Sexual Activity   • Alcohol use: No     Comment: No caffeine use   • Drug use: No   • Sexual activity: Defer       Family History   Problem Relation Age of Onset   • Cancer Mother    • Aneurysm Father    • Heart disease Father    • Aneurysm Brother    • Heart disease Brother    • Hypertension Cousin    • Obesity Cousin    • Lung cancer Cousin    • Malig Hyperthermia Neg Hx        The following portion of the patient's history were reviewed and updated as appropriate: past medical history, past surgical history, past social history, past family history, allergies, current medications, and problem list.    Review of Systems   Constitutional: Negative for chills, diaphoresis, fever, malaise/fatigue, night sweats, weight gain and weight loss.   HENT: Negative for hearing loss, nosebleeds, sore throat and tinnitus.    Eyes: Negative for blurred vision, double vision, pain and visual disturbance.   Cardiovascular: Positive for leg swelling. Negative for claudication, cyanosis, dyspnea on exertion, irregular heartbeat, near-syncope, orthopnea, palpitations, paroxysmal nocturnal dyspnea and syncope.   Respiratory: Positive for  cough, shortness of breath and wheezing. Negative for hemoptysis and snoring.    Endocrine: Negative for cold intolerance, heat intolerance and polyuria.   Hematologic/Lymphatic: Negative for bleeding problem. Does not bruise/bleed easily.   Skin: Negative for color change, dry skin, flushing and itching.   Musculoskeletal: Positive for joint swelling and myalgias. Negative for falls, joint pain, muscle cramps and muscle weakness.   Gastrointestinal: Negative for abdominal pain, constipation, heartburn, melena, nausea and vomiting.   Genitourinary: Negative for dysuria and hematuria.   Neurological: Positive for dizziness. Negative for excessive daytime sleepiness, light-headedness, loss of balance, numbness, paresthesias, seizures and vertigo.   Psychiatric/Behavioral: Positive for depression. Negative for altered mental status, memory loss and substance abuse. The patient does not have insomnia and is not nervous/anxious.    Allergic/Immunologic: Negative for environmental allergies.       No Known Allergies      Current Outpatient Medications:   •  alendronate (FOSAMAX) 70 MG tablet, Take 70 mg by mouth Every 7 (Seven) Days., Disp: , Rfl:   •  aspirin 81 MG tablet, Take 81 mg by mouth Daily., Disp: , Rfl:   •  Calcium Carbonate-Vit D-Min (CALTRATE 600+D PLUS MINERALS) 600-800 MG-UNIT tablet, Take 1 tablet by mouth Daily., Disp: , Rfl:   •  enalapril (VASOTEC) 2.5 MG tablet, Take 2.5 mg by mouth Daily., Disp: , Rfl:   •  finasteride (PROSCAR) 5 MG tablet, Take 5 mg by mouth Daily., Disp: , Rfl:   •  HYDROcodone-acetaminophen (NORCO) 5-325 MG per tablet, Take 1 tablet by mouth as needed., Disp: , Rfl:   •  L-Lysine 500 MG capsule, Take 1 capsule by mouth As Needed., Disp: , Rfl:   •  leflunomide (ARAVA) 10 MG tablet, Take 10 mg by mouth Daily., Disp: , Rfl:   •  Multiple Vitamins-Minerals (CENTRUM SILVER) tablet, Take 1 tablet by mouth Daily., Disp: , Rfl:   •  omeprazole (priLOSEC) 20 MG capsule, Take 20 mg by  "mouth Daily., Disp: , Rfl:   •  senna (SENOKOT) 8.6 MG tablet, Take 2 tablets by mouth., Disp: , Rfl:   •  Turmeric 500 MG capsule, Take  by mouth., Disp: , Rfl:   •  vitamin C (ASCORBIC ACID) 500 MG tablet, Take 1,000 mg by mouth Daily., Disp: , Rfl:   •  warfarin (COUMADIN) 5 MG tablet, TAKE 1 1/2 TABLETS BY MOUTH ON WEDNESDAYS. TAKE 1 TABLET ON ALL OTHER DAYS OR AS DIRECTED BY MED MGMT CLINIC., Disp: 105 tablet, Rfl: 1  •  Zinc 50 MG capsule, Take  by mouth., Disp: , Rfl:   •  predniSONE (DELTASONE) 5 MG tablet, Take 5 mg by mouth Daily., Disp: , Rfl:         Objective:     Vitals:    08/25/21 0911 08/25/21 0926   BP: 122/80 122/80   Pulse: 80    Weight: 68.3 kg (150 lb 9.6 oz)    Height: 180.3 cm (71\")      Body mass index is 21 kg/m².    PHYSICAL EXAM:    Vitals Reviewed.   General Appearance: No acute distress, well developed and well nourished.  Thin.  Eyes: Conjunctiva and lids: No erythema, swelling, or discharge. Sclera non-icteric.   HENT: Atraumatic, normocephalic. External eyes, ears, and nose normal. No hearing loss noted. Mucous membranes normal. Lips not cyanotic. Neck supple with no tenderness.  Respiratory: No signs of respiratory distress. Respiration rhythm and depth normal.   Clear to auscultation. No rales, crackles, rhonchi, or wheezing auscultated.   Cardiovascular:  Jugular Venous Pressure: Normal  Heart Rate and Rhythm: Normal, Heart Sounds: Mechanical valve S1 and S2. No S3 or S4 noted.  Murmurs: No murmurs noted. No rubs, thrills, or gallops.   Lower Extremities: No edema noted.  Discoloration noted on shins bilaterally.  Gastrointestinal:  Abdomen soft, non-distended, non-tender. Normal bowel sounds. No hepatomegaly.   Musculoskeletal: Normal movement of extremities  Skin and Nails: General appearance normal. No pallor, cyanosis, diaphoresis. Skin temperature normal. No clubbing of fingernails.   Psychiatric: Patient alert and oriented to person, place, and time. Speech and behavior " appropriate. Normal mood and affect.       ECG 12 Lead    Date/Time: 8/25/2021 9:11 AM  Performed by: Ro Guillaume APRN  Authorized by: Ro Guillaume APRN   Comparison: compared with previous ECG from 8/25/2020  Similar to previous ECG  Rhythm: sinus rhythm  Rate: normal  BPM: 80  Conduction: left anterior fascicular block  ST Segments: ST segments normal  T Waves: T waves normal  QRS axis: normal    Clinical impression: non-specific ECG              Assessment:       Diagnosis Plan   1. Aortic arch aneurysm (CMS/HCC)     2. H/O aortic valve replacement     3. Coronary artery disease involving native coronary artery of native heart without angina pectoris     4. Dyspnea on exertion     5. Essential hypertension     6. Obstructive sleep apnea            Plan:       1.  Ascending Aorta Dilation and Aneurysm: History of ascending aortic aneurysm repair.  He has been recommended to undergo ascending aorta repair by Dr. Cox.  In May 2021, he underwent coronary angiogram and was felt to be at acceptable risk to proceed with surgery from a cardiac standpoint.  I discussed the plan of care with Dr. Donya Gagnon and we agreed that the patient is at acceptable risk to proceed with surgery from a cardiac standpoint.  We will defer his warfarin instructions to Dr. Cox.  The surgery is currently not scheduled and I have urged the patient and his wife to contact Dr. Cox's office.    2.  Mechanical Aortic Valve Replacement: Aortic valve stable.    3.  Coronary Artery Disease: Status post coronary artery bypass graft surgery.  Recent coronary angiogram showed occluded SVG to LAD.  Nonobstructive coronary disease.  Continue goal-directed medical therapy.    4.  Dyspnea: Occurs on exertion and has been chronic and unchanged.    5.  Hypertension: Blood pressure well controlled today.    6.  Obstructive Sleep Apnea: Moderate and compliant with CPAP machine.    7.  We will be glad to see him during his  hospitalization if necessary.  We will then arrange a follow-up visit in our office post surgery.      As always, it has been a pleasure to participate in your patient's care. Thank you.       Sincerely,         ABBY Fleming        · Dictated utilizing Dragon dictation  · COVID-19 Precautions - Patient was compliant in wearing a mask. When I saw the patient, I used appropriate personal protective equipment (PPE) including mask (standard procedure).  Additionally, I used gown, gloves, and eye shield if indicated.  Hand hygiene was completed before and after seeing the patient.  I spent 36 minutes reviewing her medical records/testing/previous office notes/labs, face-to-face interaction with patient, physical examination, formulating the plan of care, and discussion of plan of care with patient.

## 2021-09-09 ENCOUNTER — ANTICOAGULATION VISIT (OUTPATIENT)
Dept: PHARMACY | Facility: HOSPITAL | Age: 81
End: 2021-09-09

## 2021-09-09 DIAGNOSIS — Z95.2 MECHANICAL HEART VALVE PRESENT: ICD-10-CM

## 2021-09-09 DIAGNOSIS — Z95.2 H/O AORTIC VALVE REPLACEMENT: Primary | ICD-10-CM

## 2021-09-09 LAB — INR PPP: 3.1

## 2021-09-09 NOTE — PROGRESS NOTES
Anticoagulation Clinic Progress Note    Anticoagulation Summary  As of 9/9/2021    INR goal:  2.5-3.5   TTR:  92.4 % (2.7 y)   INR used for dosing:  3.10 (9/9/2021)   Warfarin maintenance plan:  2.5 mg every Tue; 5 mg all other days   Weekly warfarin total:  32.5 mg   No change documented:  Amparo Echeverria   Plan last modified:  Layla Ortega, [facility] (6/8/2021)   Next INR check:  9/23/2021   Priority:  High   Target end date:  Indefinite    Indications    H/O aortic valve replacement [Z95.2]  Mechanical heart valve present [Z95.2]             Anticoagulation Episode Summary     INR check location:  Home Draw    Preferred lab:      Send INR reminders to:   [facility]  [facility]    Comments:  **[facility]**      Anticoagulation Care Providers     Provider Role Specialty Phone number    Donya Gagnon MD Referring Cardiology [phone]          Clinic Interview:  No pertinent clinical findings have been reported.    INR History:  Anticoagulation Monitoring 8/12/2021 8/25/2021 9/9/2021   INR 2.60 2.80 3.10   INR Date 8/11/2021 8/25/2021 9/9/2021   INR Goal 2.5-3.5 2.5-3.5 2.5-3.5   Trend Same Same Same   Last Week Total 32.5 mg 32.5 mg 32.5 mg   Next Week Total 32.5 mg 32.5 mg 32.5 mg   Sun 5 mg 5 mg 5 mg   Mon 5 mg 5 mg 5 mg   Tue 2.5 mg 2.5 mg 2.5 mg   Wed 5 mg 5 mg 5 mg   Thu 5 mg 5 mg 5 mg   Fri 5 mg 5 mg 5 mg   Sat 5 mg 5 mg 5 mg   Visit Report - - -   Some recent data might be hidden       Plan:  1. INR is therapeutic today- see above in Anticoagulation Summary.    Bobby L Tapley to continue their warfarin regimen- see above in Anticoagulation Summary.  2. Follow up in 2 weeks  3. Pt has agreed to only be called if INR out of range. They have been instructed to call if any changes in medications, doses, concerns, etc. Patient expresses understanding and has no further questions at this time.    Amparo Echeverria

## 2021-09-17 ENCOUNTER — ANTICOAGULATION VISIT (OUTPATIENT)
Dept: PHARMACY | Facility: HOSPITAL | Age: 81
End: 2021-09-17

## 2021-09-17 DIAGNOSIS — Z95.2 MECHANICAL HEART VALVE PRESENT: ICD-10-CM

## 2021-09-17 DIAGNOSIS — Z95.2 H/O AORTIC VALVE REPLACEMENT: Primary | ICD-10-CM

## 2021-09-17 RX ORDER — WARFARIN SODIUM 5 MG/1
TABLET ORAL
Qty: 85 TABLET | Refills: 1 | Status: ON HOLD | OUTPATIENT
Start: 2021-09-17 | End: 2021-10-09 | Stop reason: DRUGHIGH

## 2021-09-17 NOTE — PROGRESS NOTES
Anticoagulation Clinic Progress Note    Anticoagulation Summary  As of 9/17/2021    INR goal:  2.5-3.5   TTR:  92.4 % (2.7 y)   INR used for dosing:  No new INR was available at the time of this encounter.   Warfarin maintenance plan:  2.5 mg every Tue; 5 mg all other days   Weekly warfarin total:  32.5 mg   Plan last modified:  Regis Chu, PharmD (9/17/2021)   Next INR check:  9/23/2021   Priority:  High   Target end date:  Indefinite    Indications    H/O aortic valve replacement [Z95.2]  Mechanical heart valve present [Z95.2]             Anticoagulation Episode Summary     INR check location:  Home Draw    Preferred lab:      Send INR reminders to:   [facility]  [facility]    Comments:  **[facility]**      Anticoagulation Care Providers     Provider Role Specialty Phone number    Donya Gagnon MD Referring Cardiology [phone]          Clinic Interview:  Patient Findings     Positives:  Other complaints    Negatives:  Signs/symptoms of thrombosis, Signs/symptoms of bleeding,   Laboratory test error suspected, Change in health, Change in alcohol use,   Change in activity, Upcoming invasive procedure, Emergency department   visit, Upcoming dental procedure, Missed doses, Extra doses, Change in   medications, Change in diet/appetite, Hospital admission, Bruising    Comments:  Pt called to report he ran out of warfarin yesterday so took   4.5 mg (1.5 3-mg tablets).       Clinical Outcomes     Negatives:  Major bleeding event, Thromboembolic event,   Anticoagulation-related hospital admission, Anticoagulation-related ED   visit, Anticoagulation-related fatality    Comments:  Pt called to report he ran out of warfarin yesterday so took   4.5 mg (1.5 3-mg tablets).         INR History:  Anticoagulation Monitoring 8/25/2021 9/9/2021 9/17/2021   INR 2.80 3.10 -   INR Date 8/25/2021 9/9/2021 -   INR Goal 2.5-3.5 2.5-3.5 2.5-3.5   Trend Same Same Same   Last Week Total 32.5 mg 32.5 mg 32 mg    Next Week Total 32.5 mg 32.5 mg 32.5 mg   Sun 5 mg 5 mg 5 mg   Mon 5 mg 5 mg 5 mg   Tue 2.5 mg 2.5 mg 2.5 mg   Wed 5 mg 5 mg 5 mg   Thu 5 mg 5 mg -   Fri 5 mg 5 mg 5 mg   Sat 5 mg 5 mg 5 mg   Visit Report - - -   Some recent data might be hidden       Plan:  1. INR is unknown today- see above in Anticoagulation Summary.   Will instruct Bobby L Tapley to Continue their warfarin regimen- see above in Anticoagulation Summary.  2. Follow up in 1 week w/ home test.  3. Sent warfarin refill to preferred pharmacy.   4. They have been instructed to call if any changes in medications, doses, concerns, etc. Patient expresses understanding and has no further questions at this time.    Regis Chu, PharmD

## 2021-09-17 NOTE — TELEPHONE ENCOUNTER
Warfarin refill sent to [facility] (P: [phone]) per pt request. Contacted [facility] (P: [phone]) and requested that they cancel the warfarin prescription on file to allow to be filled at [facility].

## 2021-09-22 LAB — INR PPP: 3.5

## 2021-09-23 ENCOUNTER — ANTICOAGULATION VISIT (OUTPATIENT)
Dept: PHARMACY | Facility: HOSPITAL | Age: 81
End: 2021-09-23

## 2021-09-23 DIAGNOSIS — Z95.2 MECHANICAL HEART VALVE PRESENT: ICD-10-CM

## 2021-09-23 DIAGNOSIS — Z95.2 H/O AORTIC VALVE REPLACEMENT: Primary | ICD-10-CM

## 2021-09-23 NOTE — PROGRESS NOTES
Anticoagulation Clinic Progress Note    Anticoagulation Summary  As of 9/23/2021    INR goal:  2.5-3.5   TTR:  92.5 % (2.8 y)   INR used for dosing:  3.50 (9/22/2021)   Warfarin maintenance plan:  2.5 mg every Tue; 5 mg all other days   Weekly warfarin total:  32.5 mg   No change documented:  Iza Lopez, PharmD   Plan last modified:  Regis Chu PharmD (9/17/2021)   Next INR check:  9/29/2021   Priority:  High   Target end date:  Indefinite    Indications    H/O aortic valve replacement [Z95.2]  Mechanical heart valve present [Z95.2]             Anticoagulation Episode Summary     INR check location:  Home Draw    Preferred lab:      Send INR reminders to:   [facility]    Comments:  **[facility]**      Anticoagulation Care Providers     Provider Role Specialty Phone number    Donya Gagnon MD Referring Cardiology [phone]          Clinic Interview:  Patient Findings     Negatives:  Signs/symptoms of thrombosis, Signs/symptoms of bleeding,   Laboratory test error suspected, Change in health, Change in alcohol use,   Change in activity, Upcoming invasive procedure, Emergency department   visit, Upcoming dental procedure, Missed doses, Extra doses, Change in   medications, Change in diet/appetite, Hospital admission, Bruising, Other   complaints      Clinical Outcomes     Negatives:  Major bleeding event, Thromboembolic event,   Anticoagulation-related hospital admission, Anticoagulation-related ED   visit, Anticoagulation-related fatality        INR History:  Anticoagulation Monitoring 9/9/2021 9/17/2021 9/23/2021   INR 3.10 - 3.50   INR Date 9/9/2021 - 9/22/2021   INR Goal 2.5-3.5 2.5-3.5 2.5-3.5   Trend Same Same Same   Last Week Total 32.5 mg 32 mg 32 mg   Next Week Total 32.5 mg 32.5 mg 32.5 mg   Sun 5 mg 5 mg 5 mg   Mon 5 mg 5 mg 5 mg   Tue 2.5 mg 2.5 mg 2.5 mg   Wed 5 mg 5 mg -   Thu 5 mg - 5 mg   Fri 5 mg 5 mg 5 mg   Sat 5 mg 5 mg 5 mg   Visit Report - - -    Some recent data might be hidden       Plan:  1. INR is Therapeutic today- see above in Anticoagulation Summary.   Will instruct Bobby L Tapley to Continue their warfarin regimen- see above in Anticoagulation Summary.  2. Follow up in 1 week  3. They have been instructed to call if any changes in medications, doses, concerns, etc. Patient expresses understanding and has no further questions at this time.    Iza Lopez, PharmD

## 2021-10-05 ENCOUNTER — TELEPHONE (OUTPATIENT)
Dept: CARDIAC SURGERY | Facility: CLINIC | Age: 81
End: 2021-10-05

## 2021-10-07 ENCOUNTER — TELEPHONE (OUTPATIENT)
Dept: CARDIAC SURGERY | Facility: CLINIC | Age: 81
End: 2021-10-07

## 2021-10-07 NOTE — TELEPHONE ENCOUNTER
Mrs Tapley calls back and is agreeable to seeing Dr Cox with patient 10/19/21 to rediscuss a surgery date

## 2021-10-09 ENCOUNTER — APPOINTMENT (OUTPATIENT)
Dept: GENERAL RADIOLOGY | Facility: HOSPITAL | Age: 81
End: 2021-10-09

## 2021-10-09 ENCOUNTER — APPOINTMENT (OUTPATIENT)
Dept: CT IMAGING | Facility: HOSPITAL | Age: 81
End: 2021-10-09

## 2021-10-09 ENCOUNTER — HOSPITAL ENCOUNTER (INPATIENT)
Facility: HOSPITAL | Age: 81
LOS: 3 days | Discharge: HOME | End: 2021-10-12
Attending: EMERGENCY MEDICINE | Admitting: HOSPITALIST

## 2021-10-09 DIAGNOSIS — Z95.2 MECHANICAL HEART VALVE PRESENT: Primary | ICD-10-CM

## 2021-10-09 DIAGNOSIS — J18.9 COMMUNITY ACQUIRED PNEUMONIA OF RIGHT LUNG, UNSPECIFIED PART OF LUNG: ICD-10-CM

## 2021-10-09 DIAGNOSIS — J96.01 ACUTE HYPOXEMIC RESPIRATORY FAILURE (HCC): ICD-10-CM

## 2021-10-09 PROBLEM — R00.0 TACHYCARDIA: Status: ACTIVE | Noted: 2021-10-09

## 2021-10-09 PROBLEM — R91.8 PULMONARY INFILTRATE: Status: ACTIVE | Noted: 2021-10-09

## 2021-10-09 LAB
ALBUMIN SERPL-MCNC: 4 G/DL (ref 3.5–5.2)
ALBUMIN/GLOB SERPL: 1.8 G/DL
ALP SERPL-CCNC: 45 U/L (ref 39–117)
ALT SERPL W P-5'-P-CCNC: 19 U/L (ref 1–41)
ANION GAP SERPL CALCULATED.3IONS-SCNC: 10.6 MMOL/L (ref 5–15)
AST SERPL-CCNC: 25 U/L (ref 1–40)
B PARAPERT DNA SPEC QL NAA+PROBE: NOT DETECTED
B PERT DNA SPEC QL NAA+PROBE: NOT DETECTED
BACTERIA UR QL AUTO: ABNORMAL /HPF
BASOPHILS # BLD AUTO: 0.04 10*3/MM3 (ref 0–0.2)
BASOPHILS NFR BLD AUTO: 0.5 % (ref 0–1.5)
BILIRUB SERPL-MCNC: 1.2 MG/DL (ref 0–1.2)
BILIRUB UR QL STRIP: NEGATIVE
BUN SERPL-MCNC: 27 MG/DL (ref 8–23)
BUN/CREAT SERPL: 44.3 (ref 7–25)
C PNEUM DNA NPH QL NAA+NON-PROBE: NOT DETECTED
CALCIUM SPEC-SCNC: 9 MG/DL (ref 8.6–10.5)
CHLORIDE SERPL-SCNC: 102 MMOL/L (ref 98–107)
CLARITY UR: CLEAR
CO2 SERPL-SCNC: 24.4 MMOL/L (ref 22–29)
COD CRY URNS QL: ABNORMAL /HPF
COLOR UR: ABNORMAL
CREAT SERPL-MCNC: 0.61 MG/DL (ref 0.76–1.27)
D-LACTATE SERPL-SCNC: 1.9 MMOL/L (ref 0.5–2)
D-LACTATE SERPL-SCNC: 2.1 MMOL/L (ref 0.5–2)
DEPRECATED RDW RBC AUTO: 38.7 FL (ref 37–54)
EOSINOPHIL # BLD AUTO: 0.02 10*3/MM3 (ref 0–0.4)
EOSINOPHIL NFR BLD AUTO: 0.3 % (ref 0.3–6.2)
ERYTHROCYTE [DISTWIDTH] IN BLOOD BY AUTOMATED COUNT: 12.9 % (ref 12.3–15.4)
FLUAV SUBTYP SPEC NAA+PROBE: NOT DETECTED
FLUBV RNA ISLT QL NAA+PROBE: NOT DETECTED
GFR SERPL CREATININE-BSD FRML MDRD: 127 ML/MIN/1.73
GLOBULIN UR ELPH-MCNC: 2.2 GM/DL
GLUCOSE SERPL-MCNC: 113 MG/DL (ref 65–99)
GLUCOSE UR STRIP-MCNC: NEGATIVE MG/DL
HADV DNA SPEC NAA+PROBE: NOT DETECTED
HCOV 229E RNA SPEC QL NAA+PROBE: NOT DETECTED
HCOV HKU1 RNA SPEC QL NAA+PROBE: NOT DETECTED
HCOV NL63 RNA SPEC QL NAA+PROBE: NOT DETECTED
HCOV OC43 RNA SPEC QL NAA+PROBE: NOT DETECTED
HCT VFR BLD AUTO: 38.2 % (ref 37.5–51)
HGB BLD-MCNC: 12.4 G/DL (ref 13–17.7)
HGB UR QL STRIP.AUTO: NEGATIVE
HMPV RNA NPH QL NAA+NON-PROBE: NOT DETECTED
HOLD SPECIMEN: NORMAL
HPIV1 RNA SPEC QL NAA+PROBE: NOT DETECTED
HPIV2 RNA SPEC QL NAA+PROBE: NOT DETECTED
HPIV3 RNA NPH QL NAA+PROBE: NOT DETECTED
HPIV4 P GENE NPH QL NAA+PROBE: NOT DETECTED
HYALINE CASTS UR QL AUTO: ABNORMAL /LPF
IMM GRANULOCYTES # BLD AUTO: 0.04 10*3/MM3 (ref 0–0.05)
IMM GRANULOCYTES NFR BLD AUTO: 0.5 % (ref 0–0.5)
INR PPP: 3.55 (ref 0.9–1.1)
INR PPP: 3.55 (ref 0.9–1.1)
KETONES UR QL STRIP: ABNORMAL
LEUKOCYTE ESTERASE UR QL STRIP.AUTO: ABNORMAL
LIPASE SERPL-CCNC: 22 U/L (ref 13–60)
LYMPHOCYTES # BLD AUTO: 0.72 10*3/MM3 (ref 0.7–3.1)
LYMPHOCYTES NFR BLD AUTO: 9.2 % (ref 19.6–45.3)
M PNEUMO IGG SER IA-ACNC: NOT DETECTED
MCH RBC QN AUTO: 27.1 PG (ref 26.6–33)
MCHC RBC AUTO-ENTMCNC: 32.5 G/DL (ref 31.5–35.7)
MCV RBC AUTO: 83.4 FL (ref 79–97)
MONOCYTES # BLD AUTO: 0.7 10*3/MM3 (ref 0.1–0.9)
MONOCYTES NFR BLD AUTO: 8.9 % (ref 5–12)
NEUTROPHILS NFR BLD AUTO: 6.32 10*3/MM3 (ref 1.7–7)
NEUTROPHILS NFR BLD AUTO: 80.6 % (ref 42.7–76)
NITRITE UR QL STRIP: NEGATIVE
NRBC BLD AUTO-RTO: 0 /100 WBC (ref 0–0.2)
NT-PROBNP SERPL-MCNC: 2516 PG/ML (ref 0–1800)
PH UR STRIP.AUTO: 5.5 [PH] (ref 5–8)
PLATELET # BLD AUTO: 186 10*3/MM3 (ref 140–450)
PMV BLD AUTO: 9.3 FL (ref 6–12)
POTASSIUM SERPL-SCNC: 4.1 MMOL/L (ref 3.5–5.2)
PROCALCITONIN SERPL-MCNC: 0.05 NG/ML (ref 0–0.25)
PROT SERPL-MCNC: 6.2 G/DL (ref 6–8.5)
PROT UR QL STRIP: ABNORMAL
PROTHROMBIN TIME: 35.2 SECONDS (ref 11.7–14.2)
PROTHROMBIN TIME: 35.2 SECONDS (ref 11.7–14.2)
QT INTERVAL: 340 MS
RBC # BLD AUTO: 4.58 10*6/MM3 (ref 4.14–5.8)
RBC # UR: ABNORMAL /HPF
REF LAB TEST METHOD: ABNORMAL
RHINOVIRUS RNA SPEC NAA+PROBE: NOT DETECTED
RSV RNA NPH QL NAA+NON-PROBE: NOT DETECTED
SARS-COV-2 RNA NPH QL NAA+NON-PROBE: NOT DETECTED
SODIUM SERPL-SCNC: 137 MMOL/L (ref 136–145)
SP GR UR STRIP: >=1.03 (ref 1–1.03)
SQUAMOUS #/AREA URNS HPF: ABNORMAL /HPF
TROPONIN T SERPL-MCNC: 0.02 NG/ML (ref 0–0.03)
UROBILINOGEN UR QL STRIP: ABNORMAL
WBC # BLD AUTO: 7.84 10*3/MM3 (ref 3.4–10.8)
WBC UR QL AUTO: ABNORMAL /HPF
WHOLE BLOOD HOLD SPECIMEN: NORMAL
WHOLE BLOOD HOLD SPECIMEN: NORMAL

## 2021-10-09 PROCEDURE — 71275 CT ANGIOGRAPHY CHEST: CPT

## 2021-10-09 PROCEDURE — 93010 ELECTROCARDIOGRAM REPORT: CPT | Performed by: INTERNAL MEDICINE

## 2021-10-09 PROCEDURE — 74177 CT ABD & PELVIS W/CONTRAST: CPT

## 2021-10-09 PROCEDURE — 36415 COLL VENOUS BLD VENIPUNCTURE: CPT | Performed by: EMERGENCY MEDICINE

## 2021-10-09 PROCEDURE — 84484 ASSAY OF TROPONIN QUANT: CPT | Performed by: EMERGENCY MEDICINE

## 2021-10-09 PROCEDURE — 99284 EMERGENCY DEPT VISIT MOD MDM: CPT

## 2021-10-09 PROCEDURE — 93005 ELECTROCARDIOGRAM TRACING: CPT | Performed by: EMERGENCY MEDICINE

## 2021-10-09 PROCEDURE — 83605 ASSAY OF LACTIC ACID: CPT | Performed by: EMERGENCY MEDICINE

## 2021-10-09 PROCEDURE — 63710000001 PREDNISONE PER 5 MG: Performed by: HOSPITALIST

## 2021-10-09 PROCEDURE — 87040 BLOOD CULTURE FOR BACTERIA: CPT | Performed by: EMERGENCY MEDICINE

## 2021-10-09 PROCEDURE — 83880 ASSAY OF NATRIURETIC PEPTIDE: CPT | Performed by: HOSPITALIST

## 2021-10-09 PROCEDURE — 0 IOPAMIDOL PER 1 ML: Performed by: EMERGENCY MEDICINE

## 2021-10-09 PROCEDURE — 81001 URINALYSIS AUTO W/SCOPE: CPT | Performed by: EMERGENCY MEDICINE

## 2021-10-09 PROCEDURE — 83690 ASSAY OF LIPASE: CPT | Performed by: EMERGENCY MEDICINE

## 2021-10-09 PROCEDURE — 71046 X-RAY EXAM CHEST 2 VIEWS: CPT

## 2021-10-09 PROCEDURE — 25010000002 CEFTRIAXONE PER 250 MG: Performed by: EMERGENCY MEDICINE

## 2021-10-09 PROCEDURE — 85025 COMPLETE CBC W/AUTO DIFF WBC: CPT | Performed by: EMERGENCY MEDICINE

## 2021-10-09 PROCEDURE — 93005 ELECTROCARDIOGRAM TRACING: CPT

## 2021-10-09 PROCEDURE — 84145 PROCALCITONIN (PCT): CPT | Performed by: EMERGENCY MEDICINE

## 2021-10-09 PROCEDURE — 80053 COMPREHEN METABOLIC PANEL: CPT | Performed by: EMERGENCY MEDICINE

## 2021-10-09 PROCEDURE — 85610 PROTHROMBIN TIME: CPT | Performed by: HOSPITALIST

## 2021-10-09 PROCEDURE — 0202U NFCT DS 22 TRGT SARS-COV-2: CPT | Performed by: EMERGENCY MEDICINE

## 2021-10-09 PROCEDURE — 85610 PROTHROMBIN TIME: CPT | Performed by: NURSE PRACTITIONER

## 2021-10-09 RX ORDER — ENALAPRIL MALEATE 5 MG/1
2.5 TABLET ORAL DAILY
Status: DISCONTINUED | OUTPATIENT
Start: 2021-10-09 | End: 2021-10-10

## 2021-10-09 RX ORDER — SODIUM CHLORIDE 0.9 % (FLUSH) 0.9 %
10 SYRINGE (ML) INJECTION EVERY 12 HOURS SCHEDULED
Status: DISCONTINUED | OUTPATIENT
Start: 2021-10-09 | End: 2021-10-13 | Stop reason: HOSPADM

## 2021-10-09 RX ORDER — ONDANSETRON 4 MG/1
4 TABLET, FILM COATED ORAL EVERY 6 HOURS PRN
Status: DISCONTINUED | OUTPATIENT
Start: 2021-10-09 | End: 2021-10-13 | Stop reason: HOSPADM

## 2021-10-09 RX ORDER — SODIUM CHLORIDE 0.9 % (FLUSH) 0.9 %
10 SYRINGE (ML) INJECTION AS NEEDED
Status: DISCONTINUED | OUTPATIENT
Start: 2021-10-09 | End: 2021-10-13 | Stop reason: HOSPADM

## 2021-10-09 RX ORDER — MULTIPLE VITAMINS W/ MINERALS TAB 9MG-400MCG
1 TAB ORAL DAILY
Status: DISCONTINUED | OUTPATIENT
Start: 2021-10-09 | End: 2021-10-13 | Stop reason: HOSPADM

## 2021-10-09 RX ORDER — PANTOPRAZOLE SODIUM 40 MG/1
40 TABLET, DELAYED RELEASE ORAL EVERY MORNING
Status: DISCONTINUED | OUTPATIENT
Start: 2021-10-10 | End: 2021-10-13 | Stop reason: HOSPADM

## 2021-10-09 RX ORDER — ASPIRIN 81 MG/1
81 TABLET ORAL DAILY
Status: DISCONTINUED | OUTPATIENT
Start: 2021-10-09 | End: 2021-10-13 | Stop reason: HOSPADM

## 2021-10-09 RX ORDER — LEFLUNOMIDE 20 MG/1
10 TABLET ORAL DAILY
Status: DISCONTINUED | OUTPATIENT
Start: 2021-10-09 | End: 2021-10-13 | Stop reason: HOSPADM

## 2021-10-09 RX ORDER — ACETAMINOPHEN 650 MG/1
650 SUPPOSITORY RECTAL EVERY 4 HOURS PRN
Status: DISCONTINUED | OUTPATIENT
Start: 2021-10-09 | End: 2021-10-13 | Stop reason: HOSPADM

## 2021-10-09 RX ORDER — ZINC SULFATE 50(220)MG
220 CAPSULE ORAL DAILY
Status: DISCONTINUED | OUTPATIENT
Start: 2021-10-09 | End: 2021-10-13 | Stop reason: HOSPADM

## 2021-10-09 RX ORDER — ASCORBIC ACID 500 MG
1000 TABLET ORAL DAILY
Status: DISCONTINUED | OUTPATIENT
Start: 2021-10-09 | End: 2021-10-13 | Stop reason: HOSPADM

## 2021-10-09 RX ORDER — ACETAMINOPHEN 160 MG/5ML
650 SOLUTION ORAL EVERY 4 HOURS PRN
Status: DISCONTINUED | OUTPATIENT
Start: 2021-10-09 | End: 2021-10-13 | Stop reason: HOSPADM

## 2021-10-09 RX ORDER — WARFARIN SODIUM 4 MG/1
4 TABLET ORAL
Status: DISCONTINUED | OUTPATIENT
Start: 2021-10-09 | End: 2021-10-09

## 2021-10-09 RX ORDER — FINASTERIDE 5 MG/1
5 TABLET, FILM COATED ORAL DAILY
Status: DISCONTINUED | OUTPATIENT
Start: 2021-10-09 | End: 2021-10-13 | Stop reason: HOSPADM

## 2021-10-09 RX ORDER — WARFARIN SODIUM 5 MG/1
5 TABLET ORAL DAILY
COMMUNITY

## 2021-10-09 RX ORDER — ACETAMINOPHEN 325 MG/1
650 TABLET ORAL EVERY 4 HOURS PRN
Status: DISCONTINUED | OUTPATIENT
Start: 2021-10-09 | End: 2021-10-13 | Stop reason: HOSPADM

## 2021-10-09 RX ORDER — ONDANSETRON 2 MG/ML
4 INJECTION INTRAMUSCULAR; INTRAVENOUS EVERY 6 HOURS PRN
Status: DISCONTINUED | OUTPATIENT
Start: 2021-10-09 | End: 2021-10-13 | Stop reason: HOSPADM

## 2021-10-09 RX ORDER — HYDROCODONE BITARTRATE AND ACETAMINOPHEN 5; 325 MG/1; MG/1
1 TABLET ORAL EVERY 6 HOURS PRN
Status: DISCONTINUED | OUTPATIENT
Start: 2021-10-09 | End: 2021-10-13 | Stop reason: HOSPADM

## 2021-10-09 RX ORDER — PREDNISONE 10 MG/1
5 TABLET ORAL DAILY
Status: DISCONTINUED | OUTPATIENT
Start: 2021-10-09 | End: 2021-10-13 | Stop reason: HOSPADM

## 2021-10-09 RX ORDER — SENNA PLUS 8.6 MG/1
2 TABLET ORAL NIGHTLY
Status: DISCONTINUED | OUTPATIENT
Start: 2021-10-09 | End: 2021-10-13 | Stop reason: HOSPADM

## 2021-10-09 RX ADMIN — MULTIPLE VITAMINS W/ MINERALS TAB 1 TABLET: TAB at 23:05

## 2021-10-09 RX ADMIN — ASPIRIN 81 MG: 81 TABLET, COATED ORAL at 21:32

## 2021-10-09 RX ADMIN — IOPAMIDOL 95 ML: 755 INJECTION, SOLUTION INTRAVENOUS at 12:54

## 2021-10-09 RX ADMIN — FINASTERIDE 5 MG: 5 TABLET, FILM COATED ORAL at 23:12

## 2021-10-09 RX ADMIN — SENNOSIDES 2 TABLET: 8.6 TABLET, FILM COATED ORAL at 23:05

## 2021-10-09 RX ADMIN — LEFLUNOMIDE 10 MG: 20 TABLET ORAL at 23:06

## 2021-10-09 RX ADMIN — ENALAPRIL MALEATE 2.5 MG: 5 TABLET ORAL at 23:05

## 2021-10-09 RX ADMIN — Medication 220 MG: at 23:06

## 2021-10-09 RX ADMIN — PREDNISONE 5 MG: 10 TABLET ORAL at 23:05

## 2021-10-09 RX ADMIN — OXYCODONE HYDROCHLORIDE AND ACETAMINOPHEN 1000 MG: 500 TABLET ORAL at 23:05

## 2021-10-09 RX ADMIN — CEFTRIAXONE 1 G: 1 INJECTION, POWDER, FOR SOLUTION INTRAMUSCULAR; INTRAVENOUS at 16:36

## 2021-10-09 RX ADMIN — SODIUM CHLORIDE, PRESERVATIVE FREE 10 ML: 5 INJECTION INTRAVENOUS at 23:12

## 2021-10-09 NOTE — ED PROVIDER NOTES
EMERGENCY DEPARTMENT ENCOUNTER    Room Number:  18/18  Date of encounter:  10/9/2021  PCP: Serenity Adam MD  Historian: Patient, wife      HPI:  Chief Complaint: Chest pain and abdominal pain  A complete HPI/ROS/PMH/PSH/SH/FH are unobtainable due to: None    Context: Bobby L Tapley is a 81 y.o. male who presents to the ED c/o chest pain and abdominal pain.  He is most concerned about his chest pain.  Onset 2 days ago.  This is intermittent.  It seems to worsen depending on which position he is sitting but also improved with certain movements.  It is an ache across the front of his chest.  He also complains having some right upper quadrant abdominal pain that is an ache.  No vomiting or diarrhea.    He denies having any shortness of breath but his wife notes that he had oxygen saturation of 84% on room air at home.  She has been giving him oxygen for the past 2 days.  He has been using it intermittently.      PAST MEDICAL HISTORY  Active Ambulatory Problems     Diagnosis Date Noted   • H/O aortic valve replacement 09/28/2016   • BP (high blood pressure) 09/28/2016   • HLD (hyperlipidemia) 09/28/2016   • Hx of CABG 09/28/2016   • hx of mechanical AV conduit ([id number] 23 mm) by Dr. Santana Bailey at [facility] 2/25/2004 10/31/2017   • JOY on CPAP 12/04/2017   • Hypersomnia with sleep apnea 12/04/2017   • Weight loss 12/04/2017   • Witnessed episode of apnea 12/04/2017   • BPH (benign prostatic hyperplasia) 12/04/2017   • Chronic headaches 12/04/2017   • CTS (carpal tunnel syndrome) 12/04/2017   • Dyslipidemia 12/04/2017   • High frequency hearing loss 12/04/2017   • Mechanical heart valve present 12/04/2017   • Nephrolithiasis 12/04/2017   • Osteoarthritis 12/04/2017   • Sleep related hypoxia 03/12/2018   • Situational depression 03/13/2018   • Subcutaneous lipoma 03/27/2018   • Leg pain, right 05/17/2018   • 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   • Coronary artery disease involving native coronary artery of  native heart without angina pectoris 10/18/2019   • Memory loss 02/07/2020     Resolved Ambulatory Problems     Diagnosis Date Noted   • CAD (coronary artery disease) 09/28/2016   • Snoring 12/04/2017   • Benign essential HTN 03/12/2018   • Angina, class III (HCC) 10/18/2019     Past Medical History:   Diagnosis Date   • Aneurysm of aortic arch (CMS/HCC)    • Aortic insufficiency    • Aortic stenosis    • Arthritis    • Ascending aortic aneurysm (CMS/HCC)    • Enlarged prostate    • H/O echocardiogram 02/10/2015   • Health care maintenance    • History of EKG 07/07/2015   • Hyperlipidemia    • Hypertension    • Kidney stones    • Precordial pain    • Shortness of breath          PAST SURGICAL HISTORY  Past Surgical History:   Procedure Laterality Date   • AORTIC VALVE REPAIR/REPLACEMENT     • CARDIAC CATHETERIZATION  11/27/2013   • CARDIAC CATHETERIZATION N/A 5/19/2021    Procedure: Coronary angiography;  Surgeon: Radha Herbert MD;  Location: [facility] CATH INVASIVE LOCATION;  Service: Cardiovascular;  Laterality: N/A;   • COLONOSCOPY N/A 8/15/2017    Procedure: COLONOSCOPY;  Surgeon: Hu Bergman MD;  Location: [facility] ENDOSCOPY;  Service:    • CORONARY ARTERY BYPASS GRAFT     • ROTATOR CUFF REPAIR           FAMILY HISTORY  Family History   Problem Relation Age of Onset   • Cancer Mother    • Aneurysm Father    • Heart disease Father    • Aneurysm Brother    • Heart disease Brother    • Hypertension Cousin    • Obesity Cousin    • Lung cancer Cousin    • Malig Hyperthermia Neg Hx          SOCIAL HISTORY  Social History     Socioeconomic History   • Marital status:    Tobacco Use   • Smoking status: Never Smoker   • Smokeless tobacco: Never Used   Vaping Use   • Vaping Use: Never used   Substance and Sexual Activity   • Alcohol use: No     Comment: No caffeine use   • Drug use: No   • Sexual activity: Defer         ALLERGIES  Patient has no known allergies.        REVIEW OF SYSTEMS  Review of Systems     All  systems reviewed and negative except for those discussed in HPI.       PHYSICAL EXAM    I have reviewed the triage vital signs and nursing notes.    ED Triage Vitals   Temp Heart Rate Resp BP SpO2   10/09/21 0922 10/09/21 0922 10/09/21 0922 10/09/21 0934 10/09/21 0922   97.8 °F (36.6 °C) (!) 127 17 117/66 92 %      Temp src Heart Rate Source Patient Position BP Location FiO2 (%)   10/09/21 0922 -- 10/09/21 0934 10/09/21 0934 --   Tympanic  Sitting Right arm        Physical Exam  GENERAL: not distressed  HENT: nares patent  EYES: no scleral icterus  CV: regular rhythm, regular rate, mechanical click  RESPIRATORY: normal effort, clear to auscultation bilaterally  ABDOMEN: soft, nontender  MUSCULOSKELETAL: no deformity, no lower extremity edema or tenderness  NEURO: alert, moves all extremities, follows commands  SKIN: warm, dry        LAB RESULTS  Recent Results (from the past 24 hour(s))   ECG 12 Lead    Collection Time: 10/09/21  9:29 AM   Result Value Ref Range    QT Interval 340 ms   CBC Auto Differential    Collection Time: 10/09/21 11:25 AM    Specimen: Blood   Result Value Ref Range    WBC 7.84 3.40 - 10.80 10*3/mm3    RBC 4.58 4.14 - 5.80 10*6/mm3    Hemoglobin 12.4 (L) 13.0 - 17.7 g/dL    Hematocrit 38.2 37.5 - 51.0 %    MCV 83.4 79.0 - 97.0 fL    MCH 27.1 26.6 - 33.0 pg    MCHC 32.5 31.5 - 35.7 g/dL    RDW 12.9 12.3 - 15.4 %    RDW-SD 38.7 37.0 - 54.0 fl    MPV 9.3 6.0 - 12.0 fL    Platelets 186 140 - 450 10*3/mm3    Neutrophil % 80.6 (H) 42.7 - 76.0 %    Lymphocyte % 9.2 (L) 19.6 - 45.3 %    Monocyte % 8.9 5.0 - 12.0 %    Eosinophil % 0.3 0.3 - 6.2 %    Basophil % 0.5 0.0 - 1.5 %    Immature Grans % 0.5 0.0 - 0.5 %    Neutrophils, Absolute 6.32 1.70 - 7.00 10*3/mm3    Lymphocytes, Absolute 0.72 0.70 - 3.10 10*3/mm3    Monocytes, Absolute 0.70 0.10 - 0.90 10*3/mm3    Eosinophils, Absolute 0.02 0.00 - 0.40 10*3/mm3    Basophils, Absolute 0.04 0.00 - 0.20 10*3/mm3    Immature Grans, Absolute 0.04 0.00 - 0.05  10*3/mm3    nRBC 0.0 0.0 - 0.2 /100 WBC       Ordered the above labs and independently reviewed the results.        RADIOLOGY  XR Chest 2 View    Result Date: 10/9/2021  XR CHEST 2 VW-  HISTORY: Male who is 81 years-old,  upper abdominal pain  TECHNIQUE: Frontal and lateral views of the chest  COMPARISON: CT  image from 2021  FINDINGS: The heart size is borderline. Cardiac valve marker, sternotomy wires are noted. Pulmonary vasculature is unremarkable. Aorta is tortuous. Infiltrate in the right midlung suggest developing pneumonia and/or edema. Minimal pleural effusions. No pneumothorax. No acute osseous process.      Infiltrate in the right midlung, follow-up recommended. Tortuous aorta  This report was finalized on 10/9/2021 10:03 AM by Dr. Tommy Linares M.D.        I ordered the above noted radiological studies. Reviewed by me and discussed with radiologist.  See dictation for official radiology interpretation.      PROCEDURES    Procedures      MEDICATIONS GIVEN IN ER    Medications   sodium chloride 0.9 % flush 10 mL (has no administration in time range)         PROGRESS, DATA ANALYSIS, CONSULTS, AND MEDICAL DECISION MAKING    All labs have been independently reviewed by me.  All radiology studies have been reviewed by me and discussed with radiologist dictating the report.   EKG's independently viewed and interpreted by me.  Discussion below represents my analysis of pertinent findings related to patient's condition, differential diagnosis, treatment plan and final disposition.    Differential diagnosis includes but not limited to acute coronary syndrome, pulmonary embolism, thoracic aortic dissection, pneumonia, pneumothorax, musculoskeletal pain, GERD or esophageal spasm, anxiety, myocarditis/pericarditis, esophageal rupture, pancreatitis.     History: 0  EC  Age: 2  Risk factors: 2    HEART score: 6    ED Course as of 10/09/21 1920   Sat Oct 09, 2021   1118 Chest x-ray interpreted by  myself.  Infiltrate to the right midlung. [TD]   1119 EKG interpreted by myself.  Time 9:29 AM.  Sinus rhythm.  Heart rate 113.  Left atrial normality.  Left axis deviation.  LAFB. ST depression in the lateral precordial leads. [TD]   1426 I discussed the case with Dr. Robles, hospitalist for [facility].  We reviewed the patient's labs and imaging.  He will admit for IV antibiotics and supplemental oxygen. [TD]      ED Course User Index  [TD] Tano Johnson II, MD       WBC 7.8.    PPE: The patient wore a surgical mask throughout the entire patient encounter. I wore an N95.    AS OF 11:49 EDT VITALS:    BP - 118/67  HR - 105  TEMP - 97.8 °F (36.6 °C) (Tympanic)  O2 SATS - 96%        DIAGNOSIS  Final diagnoses:   Acute hypoxemic respiratory failure (HCC)   Community acquired pneumonia of right lung, unspecified part of lung         DISPOSITION  Admit           Tano Johnson II, MD  10/09/21 3201

## 2021-10-09 NOTE — H&P
HISTORY AND PHYSICAL   [facility]        Patient Identification:  Name: Bobby L Tapley  Age: 81 y.o.  Sex: male  :  1940  MRN: [id number]                     Primary Care Physician: Serenity Adam MD    Chief Complaint: Shortness of air.    History of Present Illness:   Pleasant 81-year-old gentleman with a history of coronary disease, mechanical prosthetic aortic valve, pseudoaneurysm of the ascending aorta, memory disorder...   Despite the latter does give reasonable history.  He states he has been feeling short of air and having pain on the right side of his chest and upper abdomen area for about 3 days now.  The pain is quite lateral.  He was unable initially to identify any exacerbating relieving factors.  Later he did note that if he lies on his right side it makes it worse.  Cannot really elicit a respiratory component to this.  Its distribution is atypical noting that it seems to involve part of the upper abdomen and even epigastric area.  In addition he notes has been feeling short of air the last few days.  No fever sweats or chills.  No increase in cough or sputum production.  His wife is on oxygen at home he states he has been borrowing her oxygen.  He did have low O2 sats noted at home prior towards presenting to the emergency room.  He does have a history of rheumatoid arthritis and notes he was weaned off his steroids in September.  Overall he has not been feeling as well since.  Presently in the emergency room on nasal O2 he states he is feeling better.        Past Medical History:  Past Medical History:   Diagnosis Date   • Aneurysm of aortic arch (CMS/HCC)    • Aortic insufficiency    • Aortic stenosis    • Arthritis    • Ascending aortic aneurysm (CMS/HCC)    • CAD (coronary artery disease)    • Enlarged prostate    • H/O aortic valve replacement    • H/O echocardiogram 02/10/2015   • Health care maintenance    • History of EKG 2015   • Hyperlipidemia    •  Hypertension    • Kidney stones    • JOY on CPAP    • Precordial pain    • Shortness of breath      Past Surgical History:  Past Surgical History:   Procedure Laterality Date   • AORTIC VALVE REPAIR/REPLACEMENT     • CARDIAC CATHETERIZATION  11/27/2013   • CARDIAC CATHETERIZATION N/A 5/19/2021    Procedure: Coronary angiography;  Surgeon: Radha Herbert MD;  Location: [facility] CATH INVASIVE LOCATION;  Service: Cardiovascular;  Laterality: N/A;   • COLONOSCOPY N/A 8/15/2017    Procedure: COLONOSCOPY;  Surgeon: Hu Bergman MD;  Location: [facility] ENDOSCOPY;  Service:    • CORONARY ARTERY BYPASS GRAFT     • ROTATOR CUFF REPAIR        Home Meds:  (Not in a hospital admission)      Allergies:  No Known Allergies  Immunizations:  Immunization History   Administered Date(s) Administered   • COVID-19 (PFIZER) 03/12/2021, 04/02/2021   • Fluzone High Dose =>65 Years ([employer] ONLY) 09/27/2017     Social History:   Social History     Social History Narrative   • Not on file     Social History     Tobacco Use   • Smoking status: Never Smoker   • Smokeless tobacco: Never Used   Substance Use Topics   • Alcohol use: No     Comment: No caffeine use     Family History:  Family History   Problem Relation Age of Onset   • Cancer Mother    • Aneurysm Father    • Heart disease Father    • Aneurysm Brother    • Heart disease Brother    • Hypertension Cousin    • Obesity Cousin    • Lung cancer Cousin    • Malig Hyperthermia Neg Hx         Review of Systems  Review of Systems   Constitutional: Negative.    HENT: Negative.    Eyes: Negative.    Respiratory:        As per history of present illness.   Cardiovascular: Negative.    Gastrointestinal: Negative.    Endocrine: Negative.    Genitourinary: Negative.    Musculoskeletal:        He also complains that his neck pain is worse since being weaned off the steroids.   Skin: Negative.    Allergic/Immunologic: Negative.    Neurological: Negative.    Hematological: Negative.     Psychiatric/Behavioral: Negative.        Objective:  T Max 24 hrs: Temp (24hrs), Av.8 °F (36.6 °C), Min:97.8 °F (36.6 °C), Max:97.8 °F (36.6 °C)    Vitals Ranges:   Temp:  [97.8 °F (36.6 °C)] 97.8 °F (36.6 °C)  Heart Rate:  [105-127] 110  Resp:  [17] 17  BP: (107-134)/(64-68) 107/64      Exam:  Physical Exam  Constitutional:       General: He is not in acute distress.     Appearance: Normal appearance. He is normal weight. He is not ill-appearing, toxic-appearing or diaphoretic.   HENT:      Head: Atraumatic.      Right Ear: External ear normal.      Left Ear: External ear normal.      Nose: Nose normal.   Eyes:      Extraocular Movements: Extraocular movements intact.      Conjunctiva/sclera: Conjunctivae normal.   Cardiovascular:      Rate and Rhythm: Tachycardia present.      Pulses: Normal pulses.   Pulmonary:      Effort: Pulmonary effort is normal. No respiratory distress.      Breath sounds: No stridor. Rales present. No wheezing or rhonchi.      Comments: Dry fibrotic sounding rales on the right 1/2 to two thirds of the way up.  Chest:      Chest wall: No tenderness.   Abdominal:      General: Abdomen is flat. Bowel sounds are normal. There is no distension.      Palpations: Abdomen is soft. There is no mass.      Tenderness: There is no abdominal tenderness. There is no guarding or rebound.   Musculoskeletal:      Cervical back: Neck supple.      Right lower leg: No edema.      Left lower leg: No edema.   Skin:     General: Skin is warm and dry.   Neurological:      General: No focal deficit present.      Mental Status: He is alert and oriented to person, place, and time.      Motor: No weakness.      Coordination: Coordination normal.   Psychiatric:         Mood and Affect: Mood normal.         Behavior: Behavior normal.         Thought Content: Thought content normal.         Data Review:  All labs and radiology reviewed.    Assessment:    Acute hypoxemic respiratory failure (HCC): Associated with a  new pulmonary infiltrate and pleural effusions.  See discussion below.  BNP still pending.  May be a component of CHF.  See discussion under pulmonary infiltrate.    Pulmonary infiltrate: This does not appear to be a typical bacterial pneumonia.  No fever, no leukocytosis, normal procalcitonin level, very dry sounding rales on auscultation...   Suspect pneumonitis.  Possibilities include rheumatoid arthritis although I expect to see both sides involved to some degree.  Arava is also associated with interstitial lung disease.  Consider possibility of pneumonitis secondary to aspiration.  Antibiotics started in the emergency room.  We can continue these for the time being pending respiratory panel.  I would favor a rapid de-escalation.  I will get a pulmonology consult also.    History of prosthetic aortic valve -mechanical: Continue anticoagulation.    JOY (obstructive sleep apnea)    Coronary artery disease    Tachycardia: Likely physiologic.  Monitor    Inverted T waves anterior septal leads: No anginal type pain and normal troponin levels.  We will get an opinion from cardiology..      Plan:  Please see above.    London Robles MD  10/9/2021  16:43 EDT    EMR Dragon/Transcription disclaimer:   Much of this encounter note is an electronic transcription/translation of spoken language to printed text. The electronic translation of spoken language may permit erroneous, or at times, nonsensical words or phrases to be inadvertently transcribed; Although I have reviewed the note for such errors, some may still exist.

## 2021-10-10 LAB
ALBUMIN SERPL-MCNC: 4 G/DL (ref 3.5–5.2)
ALBUMIN/GLOB SERPL: 1.6 G/DL
ALP SERPL-CCNC: 45 U/L (ref 39–117)
ALT SERPL W P-5'-P-CCNC: 16 U/L (ref 1–41)
ANION GAP SERPL CALCULATED.3IONS-SCNC: 11.1 MMOL/L (ref 5–15)
AST SERPL-CCNC: 25 U/L (ref 1–40)
BASOPHILS # BLD AUTO: 0.04 10*3/MM3 (ref 0–0.2)
BASOPHILS NFR BLD AUTO: 0.4 % (ref 0–1.5)
BILIRUB SERPL-MCNC: 1.1 MG/DL (ref 0–1.2)
BUN SERPL-MCNC: 22 MG/DL (ref 8–23)
BUN/CREAT SERPL: 37.9 (ref 7–25)
CALCIUM SPEC-SCNC: 9.1 MG/DL (ref 8.6–10.5)
CHLORIDE SERPL-SCNC: 103 MMOL/L (ref 98–107)
CO2 SERPL-SCNC: 24.9 MMOL/L (ref 22–29)
CREAT SERPL-MCNC: 0.58 MG/DL (ref 0.76–1.27)
DEPRECATED RDW RBC AUTO: 41.3 FL (ref 37–54)
EOSINOPHIL # BLD AUTO: 0 10*3/MM3 (ref 0–0.4)
EOSINOPHIL NFR BLD AUTO: 0 % (ref 0.3–6.2)
ERYTHROCYTE [DISTWIDTH] IN BLOOD BY AUTOMATED COUNT: 13.2 % (ref 12.3–15.4)
GFR SERPL CREATININE-BSD FRML MDRD: 134 ML/MIN/1.73
GLOBULIN UR ELPH-MCNC: 2.5 GM/DL
GLUCOSE SERPL-MCNC: 99 MG/DL (ref 65–99)
HCT VFR BLD AUTO: 42.8 % (ref 37.5–51)
HGB BLD-MCNC: 13.4 G/DL (ref 13–17.7)
IMM GRANULOCYTES # BLD AUTO: 0.05 10*3/MM3 (ref 0–0.05)
IMM GRANULOCYTES NFR BLD AUTO: 0.5 % (ref 0–0.5)
INR PPP: 3.06 (ref 0.9–1.1)
L PNEUMO1 AG UR QL IA: NEGATIVE
LYMPHOCYTES # BLD AUTO: 0.9 10*3/MM3 (ref 0.7–3.1)
LYMPHOCYTES NFR BLD AUTO: 9.9 % (ref 19.6–45.3)
MCH RBC QN AUTO: 26.9 PG (ref 26.6–33)
MCHC RBC AUTO-ENTMCNC: 31.3 G/DL (ref 31.5–35.7)
MCV RBC AUTO: 85.8 FL (ref 79–97)
MONOCYTES # BLD AUTO: 0.76 10*3/MM3 (ref 0.1–0.9)
MONOCYTES NFR BLD AUTO: 8.3 % (ref 5–12)
NEUTROPHILS NFR BLD AUTO: 7.36 10*3/MM3 (ref 1.7–7)
NEUTROPHILS NFR BLD AUTO: 80.9 % (ref 42.7–76)
NRBC BLD AUTO-RTO: 0 /100 WBC (ref 0–0.2)
PLATELET # BLD AUTO: 215 10*3/MM3 (ref 140–450)
PMV BLD AUTO: 9.8 FL (ref 6–12)
POTASSIUM SERPL-SCNC: 4.5 MMOL/L (ref 3.5–5.2)
PROCALCITONIN SERPL-MCNC: 0.07 NG/ML (ref 0–0.25)
PROT SERPL-MCNC: 6.5 G/DL (ref 6–8.5)
PROTHROMBIN TIME: 31.3 SECONDS (ref 11.7–14.2)
RBC # BLD AUTO: 4.99 10*6/MM3 (ref 4.14–5.8)
S PNEUM AG SPEC QL LA: NEGATIVE
SODIUM SERPL-SCNC: 139 MMOL/L (ref 136–145)
TROPONIN T SERPL-MCNC: 0.02 NG/ML (ref 0–0.03)
TSH SERPL DL<=0.05 MIU/L-ACNC: 1.6 UIU/ML (ref 0.27–4.2)
WBC # BLD AUTO: 9.11 10*3/MM3 (ref 3.4–10.8)

## 2021-10-10 PROCEDURE — 87070 CULTURE OTHR SPECIMN AEROBIC: CPT | Performed by: INTERNAL MEDICINE

## 2021-10-10 PROCEDURE — 63710000001 PREDNISONE PER 5 MG: Performed by: HOSPITALIST

## 2021-10-10 PROCEDURE — 93010 ELECTROCARDIOGRAM REPORT: CPT | Performed by: INTERNAL MEDICINE

## 2021-10-10 PROCEDURE — 25010000002 AZITHROMYCIN PER 500 MG: Performed by: INTERNAL MEDICINE

## 2021-10-10 PROCEDURE — 80053 COMPREHEN METABOLIC PANEL: CPT | Performed by: HOSPITALIST

## 2021-10-10 PROCEDURE — 85610 PROTHROMBIN TIME: CPT | Performed by: HOSPITALIST

## 2021-10-10 PROCEDURE — 87899 AGENT NOS ASSAY W/OPTIC: CPT | Performed by: INTERNAL MEDICINE

## 2021-10-10 PROCEDURE — 99222 1ST HOSP IP/OBS MODERATE 55: CPT | Performed by: INTERNAL MEDICINE

## 2021-10-10 PROCEDURE — 87205 SMEAR GRAM STAIN: CPT | Performed by: INTERNAL MEDICINE

## 2021-10-10 PROCEDURE — 84484 ASSAY OF TROPONIN QUANT: CPT | Performed by: HOSPITALIST

## 2021-10-10 PROCEDURE — 84443 ASSAY THYROID STIM HORMONE: CPT | Performed by: HOSPITALIST

## 2021-10-10 PROCEDURE — 25010000002 CEFTRIAXONE PER 250 MG: Performed by: HOSPITALIST

## 2021-10-10 PROCEDURE — 84145 PROCALCITONIN (PCT): CPT | Performed by: HOSPITALIST

## 2021-10-10 PROCEDURE — 93005 ELECTROCARDIOGRAM TRACING: CPT | Performed by: INTERNAL MEDICINE

## 2021-10-10 PROCEDURE — 85025 COMPLETE CBC W/AUTO DIFF WBC: CPT | Performed by: HOSPITALIST

## 2021-10-10 RX ORDER — WARFARIN SODIUM 5 MG/1
5 TABLET ORAL
Status: DISCONTINUED | OUTPATIENT
Start: 2021-10-10 | End: 2021-10-13 | Stop reason: HOSPADM

## 2021-10-10 RX ORDER — AZITHROMYCIN 250 MG/1
250 TABLET, FILM COATED ORAL
Status: DISCONTINUED | OUTPATIENT
Start: 2021-10-11 | End: 2021-10-13 | Stop reason: HOSPADM

## 2021-10-10 RX ORDER — WARFARIN SODIUM 2.5 MG/1
2.5 TABLET ORAL
Status: DISCONTINUED | OUTPATIENT
Start: 2021-10-12 | End: 2021-10-12

## 2021-10-10 RX ADMIN — OXYCODONE HYDROCHLORIDE AND ACETAMINOPHEN 1000 MG: 500 TABLET ORAL at 08:33

## 2021-10-10 RX ADMIN — SODIUM CHLORIDE, PRESERVATIVE FREE 10 ML: 5 INJECTION INTRAVENOUS at 21:00

## 2021-10-10 RX ADMIN — ASPIRIN 81 MG: 81 TABLET, COATED ORAL at 08:35

## 2021-10-10 RX ADMIN — SODIUM CHLORIDE, PRESERVATIVE FREE 10 ML: 5 INJECTION INTRAVENOUS at 08:33

## 2021-10-10 RX ADMIN — Medication 220 MG: at 08:33

## 2021-10-10 RX ADMIN — PREDNISONE 5 MG: 10 TABLET ORAL at 08:33

## 2021-10-10 RX ADMIN — METOPROLOL TARTRATE 12.5 MG: 25 TABLET, FILM COATED ORAL at 21:51

## 2021-10-10 RX ADMIN — FINASTERIDE 5 MG: 5 TABLET, FILM COATED ORAL at 08:33

## 2021-10-10 RX ADMIN — ENALAPRIL MALEATE 2.5 MG: 5 TABLET ORAL at 08:33

## 2021-10-10 RX ADMIN — CEFTRIAXONE 1 G: 1 INJECTION, POWDER, FOR SOLUTION INTRAMUSCULAR; INTRAVENOUS at 16:00

## 2021-10-10 RX ADMIN — MULTIPLE VITAMINS W/ MINERALS TAB 1 TABLET: TAB at 08:33

## 2021-10-10 RX ADMIN — WARFARIN 5 MG: 5 TABLET ORAL at 18:23

## 2021-10-10 RX ADMIN — LEFLUNOMIDE 10 MG: 20 TABLET ORAL at 08:33

## 2021-10-10 RX ADMIN — PANTOPRAZOLE SODIUM 40 MG: 40 TABLET, DELAYED RELEASE ORAL at 06:09

## 2021-10-10 RX ADMIN — AZITHROMYCIN DIHYDRATE 500 MG: 500 INJECTION, POWDER, LYOPHILIZED, FOR SOLUTION INTRAVENOUS at 08:31

## 2021-10-10 NOTE — PLAN OF CARE
Goal Outcome Evaluation:         Pt. Alert, oriented x4, ambulatory, used bathroom self in his room, no voiced c/o pain at this shift, v/s stable with remaining tachycardia, pulmonary and cardiology MD consulted at this shift for pulmonary status and cardiac status,no s/s acute distress noted, Pt. Slept quietly last night,

## 2021-10-10 NOTE — CONSULTS
Date of Consultation: 10/10/21    Referral Provider: London Robles MD     Reason for Consultation: Abnormal EKG, tachycardia.    Encounter Provider: Randy Hawthorne MD    Group of Service: [facility]     Patient Name: Bobby L Tapley    :1940    Chief complaint: Shortness of breath.    History of Present Illness:      This is a very pleasant 81 year-old male with multiple medical issues and multiple cardiac issues who is normally followed by Dr. Gagnon in our group.    He has a complicated cardiac history.  In , he underwent a mechanical aortic valve replacement, Bentall procedure, transverse thoracic aortic repair, and CABG.  He last had a heart catheterization on 2021, which showed an occluded SVG to LAD, although the LAD itself did not have significant disease.  Otherwise, the left circumflex only had mild irregularities in the RCA had mild irregularities.  He recently has seen Dr. 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    He presented with 3 days of worsening dyspnea and right lateral flank pain.  He does have a history of rheumatoid arthritis, and had been weaned off of steroids in September.  He was found to have right upper lobe and right lower lobe pulmonary infiltrates, as well as a moderate sized right pleural effusion and small left pleural effusion on a CT angiogram of the chest on 10/9/2021.  He is being treated with antibiotics, and pulmonology is following him.  He was offered a bronchoscopy, although the patient declined.    He has also been significantly tachycardic, although review of the EKGs and telemetry appears to be sinus tachycardia.  He did develop inverted T waves in the anteroseptal leads, although his troponin has been negative.  He has not had any angina.  He does have quite a murmur on exam, although it is difficult to tell whether this is a  hyperdynamic murmur in the setting of his mechanical aortic valve replacement.  His family also told me that he has felt weak, and he has had a significantly decreased appetite.  He has not been eating, and has lost weight.    Previous Cardiac Testing:    Cardiac Catheterization 5/19/21  · Nonobstructive coronary disease  · Severely dilated aortic root  · Proceed with aortic root surgery    Stress Test 10/11/19  · Myocardial perfusion imaging indicates a small-sized infarct located in the apex with no significant ischemia noted.  · Paradoxical septal motion is noted..  · Impressions are consistent with a low risk study.    Echocardiogram 10/11/19  · Calculated right ventricular systolic pressure from tricuspid regurgitation is 17.9 mmHg.  · Estimated EF = 62%.  · Left ventricular systolic function is normal.  · There is a 23 mm carbomedics mechanical valve present. The aortic valve peak and mean gradients are within defined limits.  · Mild mitral valve regurgitation is present  · Ascending aorta graft is present.. Mild dilation of the ascending aorta is present.          Past Medical History:   Diagnosis Date   • Aneurysm of aortic arch (HCC)    • Aortic insufficiency    • Aortic stenosis    • Arthritis    • Ascending aortic aneurysm (HCC)    • CAD (coronary artery disease)    • Enlarged prostate    • H/O aortic valve replacement    • H/O echocardiogram 02/10/2015   • Health care maintenance    • History of EKG 07/07/2015   • Hyperlipidemia    • Hypertension    • Kidney stones    • JOY on CPAP    • Precordial pain    • Shortness of breath          Past Surgical History:   Procedure Laterality Date   • AORTIC VALVE REPAIR/REPLACEMENT     • CARDIAC CATHETERIZATION  11/27/2013   • CARDIAC CATHETERIZATION N/A 5/19/2021    Procedure: Coronary angiography;  Surgeon: Radha Herbert MD;  Location: [facility] INVASIVE LOCATION;  Service: Cardiovascular;  Laterality: N/A;   • COLONOSCOPY N/A 8/15/2017    Procedure:  COLONOSCOPY;  Surgeon: Hu Bergamn MD;  Location: [facility] ENDOSCOPY;  Service:    • CORONARY ARTERY BYPASS GRAFT     • ROTATOR CUFF REPAIR           No Known Allergies      No current facility-administered medications on file prior to encounter.     Current Outpatient Medications on File Prior to Encounter   Medication Sig Dispense Refill   • alendronate (FOSAMAX) 70 MG tablet Take 70 mg by mouth Every 7 (Seven) Days. Tuesdays     • aspirin 81 MG tablet Take 81 mg by mouth Daily.     • Calcium Carbonate-Vit D-Min (CALTRATE 600+D PLUS MINERALS) 600-800 MG-UNIT tablet Take 1 tablet by mouth Daily.     • enalapril (VASOTEC) 2.5 MG tablet Take 2.5 mg by mouth Daily.     • finasteride (PROSCAR) 5 MG tablet Take 5 mg by mouth Daily.     • HYDROCODONE-ACETAMINOPHEN PO Take  by mouth As Needed. 5.5/500     • L-Lysine 500 MG capsule Take 1 capsule by mouth As Needed.     • leflunomide (ARAVA) 10 MG tablet Take 10 mg by mouth Daily.     • Multiple Vitamins-Minerals (CENTRUM SILVER) tablet Take 1 tablet by mouth Daily.     • omeprazole (priLOSEC) 20 MG capsule Take 20 mg by mouth Daily.     • senna (SENOKOT) 8.6 MG tablet Take 2 tablets by mouth.     • TURMERIC-GINGER PO Take 2,600 mg by mouth 2 (Two) Times a Day.     • warfarin (COUMADIN) 5 MG tablet Take 5 mg by mouth Daily.     • Zinc 50 MG capsule Take  by mouth.     • predniSONE (DELTASONE) 5 MG tablet Take 5 mg by mouth Daily.     • vitamin C (ASCORBIC ACID) 500 MG tablet Take 1,000 mg by mouth Daily.           Social History     Socioeconomic History   • Marital status:    Tobacco Use   • Smoking status: Never Smoker   • Smokeless tobacco: Never Used   Vaping Use   • Vaping Use: Never used   Substance and Sexual Activity   • Alcohol use: No     Comment: No caffeine use   • Drug use: No   • Sexual activity: Defer         Family History   Problem Relation Age of Onset   • Cancer Mother    • Aneurysm Father    • Heart disease Father    • Aneurysm Brother    •  "Heart disease Brother    • Hypertension Cousin    • Obesity Cousin    • Lung cancer Cousin    • Malig Hyperthermia Neg Hx        REVIEW OF SYSTEMS:   Pertinent positives were noted in the HPI above.  Otherwise, all other systems were reviewed, and are negative.     Objective:     Vitals:    10/10/21 0454 10/10/21 0723 10/10/21 1132 10/10/21 1513   BP: 113/59 100/88  120/61   BP Location: Right arm Left arm  Left arm   Patient Position: Lying Lying  Lying   Pulse: 111   120   Resp: 18 20  20   Temp: 98 °F (36.7 °C) 97.7 °F (36.5 °C)  98.2 °F (36.8 °C)   TempSrc: Oral Oral  Oral   SpO2: 93% 92%  93%   Weight: 68.3 kg (150 lb 9.2 oz)      Height: 180.3 cm (70.98\")  180.3 cm (70.98\")      Body mass index is 21.01 kg/m².  Flowsheet Rows      First Filed Value   Admission Height 180.3 cm (70.98\") Documented at 10/10/2021 0454   Admission Weight 68.3 kg (150 lb 9.2 oz) Documented at 10/10/2021 0454           General:    No acute distress, alert and oriented x4, pleasant, thin appearing                   Head:    Normocephalic, atraumatic.   Eyes:          Conjunctivae and sclerae normal, no icterus, PERRLA   Throat:   No oral lesions, no thrush, oral mucosa moist.    Neck:   Supple, trachea midline.   Lungs:     Decreased breath sounds at the right base.    Heart:    Regular rhythm and tachycardic rate.  Mechanical S2.  III/VI harsh SM throughout.   Abdomen:     Soft, non-tender, non-distended, positive bowel sounds.    Extremities:   No clubbing, cyanosis, or edema.     Pulses:   Pulses palpable and equal bilaterally.    Skin:   No bleeding or rash.   Neuro:   Non-focal.  Moves all extremities well.    Psychiatric:   Normal mood and affect.           Lab Review:                Results from last 7 days   Lab Units 10/10/21  0612   SODIUM mmol/L 139   POTASSIUM mmol/L 4.5   CHLORIDE mmol/L 103   CO2 mmol/L 24.9   BUN mg/dL 22   CREATININE mg/dL 0.58*   GLUCOSE mg/dL 99   CALCIUM mg/dL 9.1     Results from last 7 days   Lab " Units 10/10/21  0612 10/09/21  1125   TROPONIN T ng/mL 0.018 0.018     Results from last 7 days   Lab Units 10/10/21  0612   WBC 10*3/mm3 9.11   HEMOGLOBIN g/dL 13.4   HEMATOCRIT % 42.8   PLATELETS 10*3/mm3 215     Results from last 7 days   Lab Units 10/10/21  0612 10/09/21  2036 10/09/21  1125   INR  3.06* 3.55* 3.55*                   EKG (reviewed by me personally):    EKG 10/9/21    Previous EKG 8/25/21      Assessment:   1.  Acute hypoxic respiratory failure  2.  Likely pneumonia with right upper and lower lobe infiltrates  3.  Moderate right pleural effusion   4.  Significant sinus tachycardia  5.  Mechanical aortic valve replacement in 2004  6.  Coronary artery disease  7.  Chronic anticoagulation  8.  COPD  9.  Poor appetite and weight loss  10.  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 Cox follows)    Plan:       I reviewed the EKGs and obtained an EKG after I left the room.  I thought this might be atrial flutter initially, but it actually looks like sinus tachycardia with rates in the 120s to 130s.  His enalapril has been stopped and he has been started on metoprolol 12.5 mg twice a day.  I suspect that if he truly has pneumonia this is causing the sinus tachycardia to be more extreme.  I also suspect that is related to whatever underlying illness is going on in his lungs.  He did decline a bronchoscopy that was offered by pulmonology.  He has also had a poor appetite and weight loss, which may be contributing to the tachycardia as well.    I am going to check an echocardiogram.  He did have a very significant murmur, which may be associated with a hyperdynamic state in the setting of his mechanical aortic valve replacement.  The S2 mechanical component still sounded crisp.  He should continue on the warfarin and aspirin given the mechanical aortic valve replacement.  His INR was therapeutic at about 3.      With regards to the inverted T waves  in leads V1 and V2, he just had a heart catheterization in May 2021 in anticipation for possible pseudoaneurysm surgery with Dr. Cox.  The SVG to LAD was occluded, although there was not much disease in the LAD itself.  The circumflex and RCA also had minimal disease.  I do not feel that this is ischemic in nature, and I do not feel he needs an ischemic evaluation.  He is also not having any chest discomfort.  He is in no condition currently to even consider having the pseudoaneurysm surgery performed anytime soon.    Thank you very much for this consult.    Magdiel Hawthorne MD

## 2021-10-10 NOTE — CONSULTS
Group: [facility]         CONSULT NOTE    Patient Identification:  Bobby L Tapley  81 y.o.  male  1940  [id number]            Requesting physician: Dr. Robles    Reason for Consultation: Pulmonary infiltrate    CC: Shortness of breath    History of Present Illness:  81-year-old gentleman who presents with shortness of breath.  Is been going on for about 2 to 3 days.  Still present.  Exacerbated by exertion.  Requiring oxygen to maintain normal saturations.  It is also associated with some right-sided chest pain.  The pain is worse if he lies on his right side.  He denies actual cough or sputum.  He says his wife uses oxygen at home and he has been checking his oxygen saturation and he also has developed low oxygen prior to arrival.  He recently stopped taking steroids after weaning from his rheumatologist, he was taking them for rheumatoid arthritis.  Since then he has been feeling very well.  Medical records reviewed.  H&P by Dr. Robles reviewed.  He has prosthetic aortic valve.  He has pseudoaneurysm of the ascending aorta.      Review of Systems   Constitutional: Positive for fatigue. Negative for diaphoresis and fever.   HENT: Negative for ear pain and sore throat.    Eyes: Negative for pain and visual disturbance.   Respiratory: Positive for shortness of breath. Negative for cough.    Cardiovascular: Positive for chest pain. Negative for leg swelling.   Gastrointestinal: Positive for abdominal pain. Negative for diarrhea.   Endocrine: Negative for cold intolerance and polyuria.   Genitourinary: Negative for dysuria and hematuria.   Musculoskeletal: Negative for joint swelling and myalgias.   Skin: Negative for rash and wound.   Neurological: Negative for speech difficulty and numbness.   Hematological: Negative for adenopathy. Does not bruise/bleed easily.   Psychiatric/Behavioral: Negative for agitation and confusion.       Past Medical History:  Past Medical History:   Diagnosis Date    • Aneurysm of aortic arch (HCC)    • Aortic insufficiency    • Aortic stenosis    • Arthritis    • Ascending aortic aneurysm (HCC)    • CAD (coronary artery disease)    • Enlarged prostate    • H/O aortic valve replacement    • H/O echocardiogram 02/10/2015   • Health care maintenance    • History of EKG 07/07/2015   • Hyperlipidemia    • Hypertension    • Kidney stones    • JOY on CPAP    • Precordial pain    • Shortness of breath        Past Surgical History:  Past Surgical History:   Procedure Laterality Date   • AORTIC VALVE REPAIR/REPLACEMENT     • CARDIAC CATHETERIZATION  11/27/2013   • CARDIAC CATHETERIZATION N/A 5/19/2021    Procedure: Coronary angiography;  Surgeon: Radha Herbert MD;  Location: [facility] CATH INVASIVE LOCATION;  Service: Cardiovascular;  Laterality: N/A;   • COLONOSCOPY N/A 8/15/2017    Procedure: COLONOSCOPY;  Surgeon: Hu Bergman MD;  Location: [facility] ENDOSCOPY;  Service:    • CORONARY ARTERY BYPASS GRAFT     • ROTATOR CUFF REPAIR          Home Meds:  Medications Prior to Admission   Medication Sig Dispense Refill Last Dose   • alendronate (FOSAMAX) 70 MG tablet Take 70 mg by mouth Every 7 (Seven) Days. Tuesdays   Past Week at Unknown time   • aspirin 81 MG tablet Take 81 mg by mouth Daily.   10/8/2021 at Unknown time   • Calcium Carbonate-Vit D-Min (CALTRATE 600+D PLUS MINERALS) 600-800 MG-UNIT tablet Take 1 tablet by mouth Daily.   10/8/2021 at Unknown time   • enalapril (VASOTEC) 2.5 MG tablet Take 2.5 mg by mouth Daily.   10/8/2021 at Unknown time   • finasteride (PROSCAR) 5 MG tablet Take 5 mg by mouth Daily.   10/8/2021 at Unknown time   • HYDROCODONE-ACETAMINOPHEN PO Take  by mouth As Needed. 5.5/500      • L-Lysine 500 MG capsule Take 1 capsule by mouth As Needed.   Past Week at Unknown time   • leflunomide (ARAVA) 10 MG tablet Take 10 mg by mouth Daily.   10/8/2021 at Unknown time   • Multiple Vitamins-Minerals (CENTRUM SILVER) tablet Take 1 tablet by mouth Daily.    "10/8/2021 at Unknown time   • omeprazole (priLOSEC) 20 MG capsule Take 20 mg by mouth Daily.   10/8/2021 at Unknown time   • senna (SENOKOT) 8.6 MG tablet Take 2 tablets by mouth.   10/8/2021 at Unknown time   • TURMERIC-ROYAL PO Take 2,600 mg by mouth 2 (Two) Times a Day.      • warfarin (COUMADIN) 5 MG tablet Take 5 mg by mouth Daily.      • Zinc 50 MG capsule Take  by mouth.   10/8/2021 at Unknown time   • predniSONE (DELTASONE) 5 MG tablet Take 5 mg by mouth Daily.      • vitamin C (ASCORBIC ACID) 500 MG tablet Take 1,000 mg by mouth Daily.          Allergies:  No Known Allergies    Social History:   Social History     Socioeconomic History   • Marital status:    Tobacco Use   • Smoking status: Never Smoker   • Smokeless tobacco: Never Used   Vaping Use   • Vaping Use: Never used   Substance and Sexual Activity   • Alcohol use: No     Comment: No caffeine use   • Drug use: No   • Sexual activity: Defer       Family History:  Family History   Problem Relation Age of Onset   • Cancer Mother    • Aneurysm Father    • Heart disease Father    • Aneurysm Brother    • Heart disease Brother    • Hypertension Cousin    • Obesity Cousin    • Lung cancer Cousin    • Malig Hyperthermia Neg Hx        Physical Exam:  /59 (BP Location: Right arm, Patient Position: Lying)   Pulse 111   Temp 98 °F (36.7 °C) (Oral)   Resp 18   Ht 180.3 cm (70.98\")   Wt 68.3 kg (150 lb 9.2 oz)   SpO2 93%   BMI 21.01 kg/m²  Body mass index is 21.01 kg/m². 93% 68.3 kg (150 lb 9.2 oz)  Physical Exam  Constitutional:       General: He is not in acute distress.     Appearance: He is well-developed.   HENT:      Right Ear: External ear normal.      Left Ear: External ear normal.      Nose: Nose normal.   Eyes:      General: No scleral icterus.     Conjunctiva/sclera: Conjunctivae normal.      Pupils: Pupils are equal, round, and reactive to light.   Neck:      Thyroid: No thyromegaly.      Vascular: No JVD.   Cardiovascular:      " Rate and Rhythm: Regular rhythm. Tachycardia present.      Heart sounds: Murmur heard.        Comments: Loud valvular click.   No edema in both legs  Pulmonary:      Effort: Pulmonary effort is normal.      Breath sounds: No wheezing or rales.   Abdominal:      General: There is no distension.      Palpations: Abdomen is soft.      Comments: No liver or spleen enlargment palpable   Musculoskeletal:         General: No deformity. Normal range of motion.      Cervical back: Neck supple. No rigidity.      Comments: No deformity in all 4 extrem   Skin:     Findings: No erythema or rash.      Comments: No palpable nodules   Neurological:      General: No focal deficit present.      Mental Status: He is alert and oriented to person, place, and time.      Cranial Nerves: No cranial nerve deficit.      Motor: No weakness.   Psychiatric:         Mood and Affect: Mood normal.         Thought Content: Thought content normal.         LABS:  COVID19   Date Value Ref Range Status   10/09/2021 Not Detected Not Detected - Ref. Range Final       Lab Results   Component Value Date    CALCIUM 9.0 10/09/2021     Results from last 7 days   Lab Units 10/09/21  1125   SODIUM mmol/L 137   POTASSIUM mmol/L 4.1   CHLORIDE mmol/L 102   CO2 mmol/L 24.4   BUN mg/dL 27*   CREATININE mg/dL 0.61*   GLUCOSE mg/dL 113*   CALCIUM mg/dL 9.0   WBC 10*3/mm3 7.84   HEMOGLOBIN g/dL 12.4*   PLATELETS 10*3/mm3 186   ALT (SGPT) U/L 19   AST (SGOT) U/L 25   PROBNP pg/mL 2,516.0*   PROCALCITONIN ng/mL 0.05     Lab Results   Component Value Date    CKTOTAL 327 (H) 11/18/2019    TROPONINT 0.018 10/09/2021     Results from last 7 days   Lab Units 10/09/21  1125   TROPONIN T ng/mL 0.018         Results from last 7 days   Lab Units 10/09/21  2036 10/09/21  1508 10/09/21  1125   PROCALCITONIN ng/mL  --   --  0.05   LACTATE mmol/L 1.9 2.1*  --          Results from last 7 days   Lab Units 10/09/21  1518   ADENOVIRUS DETECTION BY PCR  Not Detected   CORONAVIRUS 229E   Not Detected   CORONAVIRUS HKU1  Not Detected   CORONAVIRUS NL63  Not Detected   CORONAVIRUS OC43  Not Detected   HUMAN METAPNEUMOVIRUS  Not Detected   HUMAN RHINOVIRUS/ENTEROVIRUS  Not Detected   INFLUENZA B PCR  Not Detected   PARAINFLUENZA 1  Not Detected   PARAINFLUENZA VIRUS 2  Not Detected   PARAINFLUENZA VIRUS 3  Not Detected   PARAINFLUENZA VIRUS 4  Not Detected   BORDETELLA PERTUSSIS PCR  Not Detected   BORDETELLA PARAPERTUSSIS PCR  Not Detected   CHLAMYDOPHILA PNEUMONIAE PCR  Not Detected   MYCOPLAMA PNEUMO PCR  Not Detected   RSV, PCR  Not Detected     Results from last 7 days   Lab Units 10/09/21  2036 10/09/21  1125   INR  3.55* 3.55*         Lab Results   Component Value Date    TSH 3.230 11/19/2020     Estimated Creatinine Clearance: 70 mL/min (A) (by C-G formula based on SCr of 0.61 mg/dL (L)).  Results from last 7 days   Lab Units 10/09/21  1135   NITRITE UA  Negative   WBC UA /HPF 13-20*   BACTERIA UA /HPF 1+*   SQUAM EPITHEL UA /HPF None Seen        Imaging: I personally visualized the images of CT scan of the chest showing right upper lobe infiltrate.  He does have bilateral emphysematous changes..      Assessment:  Acute hypoxemic respiratory failure (HCC)  Pneumonia  Anemia  Prosthetic aortic valve  On anticoagulation  COPD/emphysema      Recommendations:  I discussed with the patient the risks, benefits and alternatives to bronchoscopy with bronchoalveolar lavage for cultures.  I do recommend bronchoscopy in order to isolate the pathogen and more appropriately tailor antibiotic therapy rather than trying empiric antibiotics.  The patient does not want to proceed with bronchoscopy at this time.  I recommend empiric antibiotic coverage with Rocephin and azithromycin for now.  Send sputum culture if patient able to produce any sputum.  Send urine for Legionella and streptococcal antigen.  Monitor temperature curve, white count and procalcitonin level.      Patient was placed in face mask upon  entering room and kept mask on throughout our encounter. I wore full protective equipment throughout this patient encounter including a face mask, gown and gloves. Hand hygiene was performed before donning protective equipment and after removal when leaving the room.    Miller Guthrie MD  10/10/2021  06:32 EDT      Much of this encounter note is an electronic transcription/translation of spoken language to printed text using Dragon Software.

## 2021-10-10 NOTE — PROGRESS NOTES
Pharmacy Consult: Warfarin Dosing/ Monitoring    Bobby L Tapley is a 81 y.o. male, CrCl cannot be calculated (Unknown ideal weight.). weighing  .     has a past medical history of Aneurysm of aortic arch (HCC), Aortic insufficiency, Aortic stenosis, Arthritis, Ascending aortic aneurysm (HCC), CAD (coronary artery disease), Enlarged prostate, H/O aortic valve replacement, H/O echocardiogram (02/10/2015), Health care maintenance, History of EKG (07/07/2015), Hyperlipidemia, Hypertension, Kidney stones, JOY on CPAP, Precordial pain, and Shortness of breath.    Social History     Tobacco Use    Smoking status: Never Smoker    Smokeless tobacco: Never Used   Vaping Use    Vaping Use: Never used   Substance Use Topics    Alcohol use: No     Comment: No caffeine use    Drug use: No       Results from last 7 days   Lab Units 10/09/21  2036 10/09/21  1125   INR  3.55* 3.55*   HEMOGLOBIN g/dL  --  12.4*   HEMATOCRIT %  --  38.2   PLATELETS 10*3/mm3  --  186     Results from last 7 days   Lab Units 10/09/21  1125   SODIUM mmol/L 137   POTASSIUM mmol/L 4.1   CHLORIDE mmol/L 102   CO2 mmol/L 24.4   BUN mg/dL 27*   CREATININE mg/dL 0.61*   CALCIUM mg/dL 9.0   BILIRUBIN mg/dL 1.2   ALK PHOS U/L 45   ALT (SGPT) U/L 19   AST (SGOT) U/L 25   GLUCOSE mg/dL 113*     Anticoagulation history: H/o aortic valve replacement anticoagulated on warfarin and managed by [facility] coagulation clinic. Most recent follow up on 9/23. Patient taking warfarin 2.5mg on Tuesday and 5mg on all other days.    Hospital Anticoagulation:  Consulting provider: Dr. Robles  Start date: 10/9/21  Indication: Mechanical aortic valve  Target INR: 2.5-3.5  Expected duration: Indefinite   Bridge Therapy: No                  Date 10/9            INR 3.55            Warfarin dose Holding this PM              Potential drug interactions: Acetaminophen - dose dependent reduction in warfarin metabolism resulting in increased risk of bleeding    ASA - may result in  increased risk of bleeding via increased platelet inhibition    Prednisone - may result in increased risk of bleeding    Relevant nutrition status:     Other:     Education complete?/ Date: Not at this time    Assessment/Plan:    INR slightly supratherapeutic on admission. A reduced dose of 2mg once tonight was considered but RN unsure if patient took dose this AM prior to admission so will hold dose tonight and follow up with INR in AM    Dose: Hold tonight  Monitor: S/s of bleeding, INR trends, DDI  Follow up: INR in AM    Pharmacy will continue to follow until discharge or discontinuation of warfarin.   Mitesh Napier, [facility]  10/9/2021

## 2021-10-10 NOTE — CONSULTS
"Adult Nutrition  Assessment/PES    Patient Name:  Bobby L Tapley  YOB: 1940  MRN: [id number]  Admit Date:  10/9/2021    Assessment Date:  10/10/2021    Comments:  Nutrition consult d/t MST score of 2 per nurse admission screen.  Admitted with acute respiratory failure.  Pulmonary infiltrate, patient does not want bronchoscopy.  No significant weight loss noted.  No PO intake available at this time.    RD to continue to follow to see how patient does with PO intake.     Reason for Assessment     Row Name 10/10/21 1131          Reason for Assessment    Reason For Assessment nurse/nurse practitioner consult; identified at risk by screening criteria     Diagnosis cardiac disease; other (see comments); renal disease; pulmonary disease  aneurysm of aortic arch, aortic insufficiency, aortic stenosis, arthritis, CAD, HLD, HTN, kidney stones, JOY, RA; adm with acute resp failure     Identified At Risk by Screening Criteria MST SCORE 2+; unintentional loss of 10 lbs or more in the past 2 mos                Nutrition/Diet History     Row Name 10/10/21 1132          Nutrition/Diet History    Typical Food/Fluid Intake no PO intake available                Anthropometrics     Row Name 10/10/21 1132 10/10/21 0454       Anthropometrics    Height 180.3 cm (70.98\") 180.3 cm (70.98\")    Weight -- 68.3 kg (150 lb 9.2 oz)       Admit Weight    Admit Weight --  150# 10/10 --       Ideal Body Weight (IBW)    Ideal Body Weight (IBW) (kg) 79.23 79.23    % Ideal Body Weight -- 86.21       Usual Body Weight (UBW)    Weight Loss Time Frame no sig wt loss noted --       Body Mass Index (BMI)    BMI (kg/m2) -- 21.05    BMI Assessment BMI 18.5-24.9: normal  20.95 --               Labs/Tests/Procedures/Meds     Row Name 10/10/21 1133          Labs/Procedures/Meds    Lab Results Reviewed reviewed, pertinent     Lab Results Comments Creat            Diagnostic Tests/Procedures    Diagnostic Test/Procedure Reviewed reviewed, pertinent  " "          Medications    Pertinent Medications Reviewed reviewed, pertinent     Pertinent Medications Comments vit C, zithromax, MVI, protonix, prednisone, senokot, ZnSO4                Physical Findings     Row Name 10/10/21 1134          Physical Findings    Overall Physical Appearance on oxygen therapy     Skin --  B=20, intact                Estimated/Assessed Needs     Row Name 10/10/21 1134 10/10/21 1132       Calculation Measurements    Weight Used For Calculations 68.3 kg (150 lb 9.2 oz) --    Height -- 180.3 cm (70.98\")       Estimated/Assessed Needs    Additional Documentation KCAL/KG (Group); Protein Requirements (Group); Fluid Requirements (Group) --       KCAL/KG    KCAL/KG 25 Kcal/Kg (kcal); 30 Kcal/Kg (kcal) --    25 Kcal/Kg (kcal) 1707.5 --    30 Kcal/Kg (kcal) 2049 --       Protein Requirements    Weight Used For Protein Calculations 68.3 kg (150 lb 9.2 oz) --    Est Protein Requirement Amount (gms/kg) 1.0 gm protein --    Estimated Protein Requirements (gms/day) 68.3 --       Fluid Requirements    Fluid Requirements (mL/day) 2000 --    Estimated Fluid Requirement Method RDA Method --    RDA Method (mL) 2000 --    Row Name 10/10/21 0454          Calculation Measurements    Height 180.3 cm (70.98\")                Nutrition Prescription Ordered     Row Name 10/10/21 1134          Nutrition Prescription PO    Current PO Diet Regular                Evaluation of Received Nutrient/Fluid Intake     Row Name 10/10/21 1135          PO Evaluation    Number of Days PO Intake Evaluated Insufficient Data                     Problem/Interventions:   Problem 1     Row Name 10/10/21 1135          Nutrition Diagnoses Problem 1    Problem 1 Nutrition Appropriate for Condition at this Time                      Intervention Goal     Row Name 10/10/21 1135          Intervention Goal    General Maintain nutrition; Disease management/therapy; Meet nutritional needs for age/condition     PO Establish PO; Tolerate PO; PO " intake (%)     PO Intake % 75 %     Weight Maintain weight                Nutrition Intervention     Row Name 10/10/21 1135          Nutrition Intervention    RD/Tech Action Follow Tx progress; Care plan reviewd                  Education/Evaluation     Row Name 10/10/21 1135          Education    Education Will Instruct as appropriate            Monitor/Evaluation    Monitor Per protocol; PO intake; Pertinent labs; Weight; Symptoms                 Electronically signed by:  Jennifer Oakes RD  10/10/21 11:36 EDT

## 2021-10-10 NOTE — PLAN OF CARE
"Goal Outcome Evaluation:  Plan of Care Reviewed With: patient           Outcome Summary: States has occ \"arthritis\" pain. SOA with activity. Periods of tachycardia 130-150. 02 2L NC. IV antx given.  "

## 2021-10-10 NOTE — PLAN OF CARE
Goal Outcome Evaluation:  Plan of Care Reviewed With: patient           Outcome Summary: Admitted to room 655 from ER. Denies SOA. Respirations nonlabored. 02 2L NC. Telemetry .

## 2021-10-10 NOTE — PROGRESS NOTES
"DAILY PROGRESS NOTE  [facility]    Patient Identification:  Name: Bobby L Tapley  Age: 81 y.o.  Sex: male  :  1940  MRN: [id number]         Primary Care Physician: Serenity Adam MD      Subjective  No new complaints.  States he thinks he might feel just a little better.    Objective:  General Appearance:  Comfortable, well-appearing, in no acute distress and not in pain.    Vital signs: (most recent): Blood pressure 120/61, pulse 120, temperature 98.2 °F (36.8 °C), temperature source Oral, resp. rate 20, height 180.3 cm (70.98\"), weight 68.3 kg (150 lb 9.2 oz), SpO2 93 %.    Lungs:  Normal effort and normal respiratory rate.  He is not in respiratory distress.  No stridor.  There are rales.  No decreased breath sounds, wheezes or rhonchi.  (Very dry, fibrotic sounding rales heard now bilaterally.  Right more prominent than the left.)  Heart: Tachycardia.  Regular rhythm.  Positive for murmur.  (Patient presently tachycardic.  States he just been up into the bathroom and back.  Prosthetic clicks and murmur present.)  Extremities: There is no dependent edema.    Neurological: Patient is alert and oriented to person, place and time.    Skin:  Warm and dry.                Vital signs in last 24 hours:  Temp:  [97.7 °F (36.5 °C)-98.7 °F (37.1 °C)] 98.2 °F (36.8 °C)  Heart Rate:  [111-123] 120  Resp:  [18-20] 20  BP: (100-123)/(59-88) 120/61    Intake/Output:  No intake or output data in the 24 hours ending 10/10/21 1922      Results from last 7 days   Lab Units 10/10/21  0612 10/09/21  1125   WBC 10*3/mm3 9.11 7.84   HEMOGLOBIN g/dL 13.4 12.4*   PLATELETS 10*3/mm3 215 186     Results from last 7 days   Lab Units 10/10/21  0612 10/09/21  1125   SODIUM mmol/L 139 137   POTASSIUM mmol/L 4.5 4.1   CHLORIDE mmol/L 103 102   CO2 mmol/L 24.9 24.4   BUN mg/dL 22 27*   CREATININE mg/dL 0.58* 0.61*   GLUCOSE mg/dL 99 113*   Estimated Creatinine Clearance: 70 mL/min (A) (by C-G formula based on SCr of " 0.58 mg/dL (L)).  Results from last 7 days   Lab Units 10/10/21  0612 10/09/21  1125   CALCIUM mg/dL 9.1 9.0   ALBUMIN g/dL 4.00 4.00     Results from last 7 days   Lab Units 10/10/21  0612 10/09/21  1125   ALBUMIN g/dL 4.00 4.00   BILIRUBIN mg/dL 1.1 1.2   ALK PHOS U/L 45 45   AST (SGOT) U/L 25 25   ALT (SGPT) U/L 16 19       Assessment:  Acute hypoxemic respiratory failure (HCC): Associated with a new pulmonary infiltrate and pleural effusions.  See discussion below.  BNP still pending.  May be a component of CHF.  See discussion under pulmonary infiltrate.    Pulmonary infiltrate:  Procalcitonin level still normal.  WBC still normal.  Still afebrile.  Respiratory panel negative.  This does not appear to be a typical bacterial pneumonia.  Very dry sounding rales on auscultation.   Possibilities include rheumatoid arthritis although I expect to see both sides involved to some degree.  Arava is also associated with interstitial lung disease.  Consider possibility of pneumonitis secondary to aspiration.    Presently on empiric antibiotics.  Pulmonary consult appreciated.  Patient is declining bronchoscopy.  I did discuss this with the patient again.  He will think about it overnight.  Since I am now hearing rales bilaterally I will have a chest x-ray rechecked in the morning.    History of prosthetic aortic valve -mechanical: Continue anticoagulation.    JOY (obstructive sleep apnea)    Coronary artery disease    Tachycardia:  Sinus tachycardia has been quite persistent.  Of input is Vasotec on hold and start a beta-blocker.    Inverted T waves anterior septal leads: No anginal type pain and normal troponin levels.    Cardiology input pending.  EKG improved today.    Plan:  Please see above.    London Robles MD  10/10/2021  19:22 EDT

## 2021-10-10 NOTE — PROGRESS NOTES
Pharmacy Consult: Warfarin Dosing/ Monitoring    Bobby L Tapley is a 81 y.o. male, estimated creatinine clearance is 70 mL/min (A) (by C-G formula based on SCr of 0.58 mg/dL (L)). weighing 68.3 kg (150 lb 9.2 oz).     has a past medical history of Aneurysm of aortic arch (HCC), Aortic insufficiency, Aortic stenosis, Arthritis, Ascending aortic aneurysm (HCC), CAD (coronary artery disease), Enlarged prostate, H/O aortic valve replacement, H/O echocardiogram (02/10/2015), Health care maintenance, History of EKG (07/07/2015), Hyperlipidemia, Hypertension, Kidney stones, JOY on CPAP, Precordial pain, and Shortness of breath.    Social History     Tobacco Use    Smoking status: Never Smoker    Smokeless tobacco: Never Used   Vaping Use    Vaping Use: Never used   Substance Use Topics    Alcohol use: No     Comment: No caffeine use    Drug use: No       Results from last 7 days   Lab Units 10/10/21  0612 10/09/21  2036 10/09/21  1125   INR  3.06* 3.55* 3.55*   HEMOGLOBIN g/dL 13.4  --  12.4*   HEMATOCRIT % 42.8  --  38.2   PLATELETS 10*3/mm3 215  --  186     Results from last 7 days   Lab Units 10/10/21  0612 10/09/21  1125   SODIUM mmol/L 139 137   POTASSIUM mmol/L 4.5 4.1   CHLORIDE mmol/L 103 102   CO2 mmol/L 24.9 24.4   BUN mg/dL 22 27*   CREATININE mg/dL 0.58* 0.61*   CALCIUM mg/dL 9.1 9.0   BILIRUBIN mg/dL 1.1 1.2   ALK PHOS U/L 45 45   ALT (SGPT) U/L 16 19   AST (SGOT) U/L 25 25   GLUCOSE mg/dL 99 113*     Anticoagulation history: H/o aortic valve replacement anticoagulated on warfarin and managed by [facility] coagulation clinic. Most recent follow up on 9/23. Patient taking warfarin 2.5mg on Tuesday and 5mg on all other days.    Hospital Anticoagulation:  Consulting provider: Dr. Robles  Start date: 10/9/21  Indication: Mechanical aortic valve  Target INR: 2.5-3.5  Expected duration: Indefinite   Bridge Therapy: No                  Date 10/9 10/10           INR 3.55 3.06           Warfarin dose Holding this PM               Potential drug interactions:   Acetaminophen - dose dependent reduction in warfarin metabolism resulting in increased risk of bleeding    ASA - may result in increased risk of bleeding via increased platelet inhibition    Azithromycin- may result in an increased risk of bleeding.     Ceftriaxone- may result in an increased risk of bleeding.     Leflunoamide- may result in increased risk of bleeding.     Pantoprazole- may result in increased International Normalized Ratio (INR) and prothrombin time    Prednisone - may result in increased risk of bleeding    Relevant nutrition status: Regular diet    Other:     Education complete?/ Date: Not at this time    Assessment/Plan:    INR is back within therapeutic range. Will resume home dose. Warfarin 2.5mg on Tuesday and 5mg on all other days.    Monitor: S/s of bleeding  Follow up: INR in AM    Pharmacy will continue to follow until discharge or discontinuation of warfarin.   Lakeisha Peña RPH  10/10/2021

## 2021-10-11 ENCOUNTER — APPOINTMENT (OUTPATIENT)
Dept: GENERAL RADIOLOGY | Facility: HOSPITAL | Age: 81
End: 2021-10-11

## 2021-10-11 ENCOUNTER — APPOINTMENT (OUTPATIENT)
Dept: CARDIOLOGY | Facility: HOSPITAL | Age: 81
End: 2021-10-11

## 2021-10-11 LAB
ALBUMIN SERPL-MCNC: 4.2 G/DL (ref 3.5–5.2)
ALBUMIN/GLOB SERPL: 1.8 G/DL
ALP SERPL-CCNC: 47 U/L (ref 39–117)
ALT SERPL W P-5'-P-CCNC: 20 U/L (ref 1–41)
ANION GAP SERPL CALCULATED.3IONS-SCNC: 12 MMOL/L (ref 5–15)
AST SERPL-CCNC: 30 U/L (ref 1–40)
BASOPHILS # BLD AUTO: 0.06 10*3/MM3 (ref 0–0.2)
BASOPHILS NFR BLD AUTO: 0.5 % (ref 0–1.5)
BH CV ECHO MEAS - AO MAX PG (FULL): 50.8 MMHG
BH CV ECHO MEAS - AO MAX PG: 56.6 MMHG
BH CV ECHO MEAS - AO MEAN PG (FULL): 22 MMHG
BH CV ECHO MEAS - AO MEAN PG: 25 MMHG
BH CV ECHO MEAS - AO ROOT AREA (BSA CORRECTED): 2.4
BH CV ECHO MEAS - AO ROOT AREA: 15.9 CM^2
BH CV ECHO MEAS - AO ROOT DIAM: 4.5 CM
BH CV ECHO MEAS - AO V2 MAX: 376 CM/SEC
BH CV ECHO MEAS - AO V2 MEAN: 237 CM/SEC
BH CV ECHO MEAS - AO V2 VTI: 66.3 CM
BH CV ECHO MEAS - AVA(I,A): 1.1 CM^2
BH CV ECHO MEAS - AVA(I,D): 1.1 CM^2
BH CV ECHO MEAS - AVA(V,A): 0.9 CM^2
BH CV ECHO MEAS - AVA(V,D): 0.9 CM^2
BH CV ECHO MEAS - BSA(HAYCOCK): 1.8 M^2
BH CV ECHO MEAS - BSA: 1.9 M^2
BH CV ECHO MEAS - BZI_BMI: 20.9 KILOGRAMS/M^2
BH CV ECHO MEAS - BZI_METRIC_HEIGHT: 180.3 CM
BH CV ECHO MEAS - BZI_METRIC_WEIGHT: 68 KG
BH CV ECHO MEAS - EDV(CUBED): 97.3 ML
BH CV ECHO MEAS - EDV(MOD-SP2): 70 ML
BH CV ECHO MEAS - EDV(MOD-SP4): 62 ML
BH CV ECHO MEAS - EDV(TEICH): 97.3 ML
BH CV ECHO MEAS - EF(CUBED): 72.3 %
BH CV ECHO MEAS - EF(MOD-BP): 60.2 %
BH CV ECHO MEAS - EF(MOD-SP2): 58.6 %
BH CV ECHO MEAS - EF(MOD-SP4): 61.3 %
BH CV ECHO MEAS - EF(TEICH): 64 %
BH CV ECHO MEAS - ESV(CUBED): 27 ML
BH CV ECHO MEAS - ESV(MOD-SP2): 29 ML
BH CV ECHO MEAS - ESV(MOD-SP4): 24 ML
BH CV ECHO MEAS - ESV(TEICH): 35 ML
BH CV ECHO MEAS - FS: 34.8 %
BH CV ECHO MEAS - IVS/LVPW: 1
BH CV ECHO MEAS - IVSD: 1.3 CM
BH CV ECHO MEAS - LAT PEAK E' VEL: 6.6 CM/SEC
BH CV ECHO MEAS - LV DIASTOLIC VOL/BSA (35-75): 33.2 ML/M^2
BH CV ECHO MEAS - LV MASS(C)D: 230.2 GRAMS
BH CV ECHO MEAS - LV MASS(C)DI: 123.3 GRAMS/M^2
BH CV ECHO MEAS - LV MAX PG: 5.8 MMHG
BH CV ECHO MEAS - LV MEAN PG: 3 MMHG
BH CV ECHO MEAS - LV SYSTOLIC VOL/BSA (12-30): 12.9 ML/M^2
BH CV ECHO MEAS - LV V1 MAX: 120 CM/SEC
BH CV ECHO MEAS - LV V1 MEAN: 87.3 CM/SEC
BH CV ECHO MEAS - LV V1 VTI: 25.6 CM
BH CV ECHO MEAS - LVIDD: 4.6 CM
BH CV ECHO MEAS - LVIDS: 3 CM
BH CV ECHO MEAS - LVLD AP2: 8.4 CM
BH CV ECHO MEAS - LVLD AP4: 7.7 CM
BH CV ECHO MEAS - LVLS AP2: 7.2 CM
BH CV ECHO MEAS - LVLS AP4: 6.8 CM
BH CV ECHO MEAS - LVOT AREA (M): 2.8 CM^2
BH CV ECHO MEAS - LVOT AREA: 2.8 CM^2
BH CV ECHO MEAS - LVOT DIAM: 1.9 CM
BH CV ECHO MEAS - LVPWD: 1.3 CM
BH CV ECHO MEAS - MED PEAK E' VEL: 5 CM/SEC
BH CV ECHO MEAS - MV A DUR: 0.08 SEC
BH CV ECHO MEAS - MV A MAX VEL: 154 CM/SEC
BH CV ECHO MEAS - MV DEC TIME: 0.1 SEC
BH CV ECHO MEAS - MV E MAX VEL: 186 CM/SEC
BH CV ECHO MEAS - MV E/A: 1.2
BH CV ECHO MEAS - MV MAX PG: 12.7 MMHG
BH CV ECHO MEAS - MV MEAN PG: 6 MMHG
BH CV ECHO MEAS - MV V2 MAX: 178 CM/SEC
BH CV ECHO MEAS - MV V2 MEAN: 111 CM/SEC
BH CV ECHO MEAS - MV V2 VTI: 33.2 CM
BH CV ECHO MEAS - MVA(VTI): 2.2 CM^2
BH CV ECHO MEAS - PULM A REVS DUR: 0.07 SEC
BH CV ECHO MEAS - PULM A REVS VEL: 45.9 CM/SEC
BH CV ECHO MEAS - PULM DIAS VEL: 73.9 CM/SEC
BH CV ECHO MEAS - PULM S/D: 0.49
BH CV ECHO MEAS - PULM SYS VEL: 36.3 CM/SEC
BH CV ECHO MEAS - QP/QS: 0.45
BH CV ECHO MEAS - RAP SYSTOLE: 8 MMHG
BH CV ECHO MEAS - RV MAX PG: 0.52 MMHG
BH CV ECHO MEAS - RV MEAN PG: 0 MMHG
BH CV ECHO MEAS - RV V1 MAX: 35.9 CM/SEC
BH CV ECHO MEAS - RV V1 MEAN: 27.7 CM/SEC
BH CV ECHO MEAS - RV V1 VTI: 7.3 CM
BH CV ECHO MEAS - RVOT AREA: 4.5 CM^2
BH CV ECHO MEAS - RVOT DIAM: 2.4 CM
BH CV ECHO MEAS - RVSP: 26.3 MMHG
BH CV ECHO MEAS - SI(AO): 565 ML/M^2
BH CV ECHO MEAS - SI(CUBED): 37.7 ML/M^2
BH CV ECHO MEAS - SI(LVOT): 38.9 ML/M^2
BH CV ECHO MEAS - SI(MOD-SP2): 22 ML/M^2
BH CV ECHO MEAS - SI(MOD-SP4): 20.4 ML/M^2
BH CV ECHO MEAS - SI(TEICH): 33.4 ML/M^2
BH CV ECHO MEAS - SUP REN AO DIAM: 2.2 CM
BH CV ECHO MEAS - SV(AO): 1054 ML
BH CV ECHO MEAS - SV(CUBED): 70.3 ML
BH CV ECHO MEAS - SV(LVOT): 72.6 ML
BH CV ECHO MEAS - SV(MOD-SP2): 41 ML
BH CV ECHO MEAS - SV(MOD-SP4): 38 ML
BH CV ECHO MEAS - SV(RVOT): 33 ML
BH CV ECHO MEAS - SV(TEICH): 62.3 ML
BH CV ECHO MEAS - TAPSE (>1.6): 1.1 CM
BH CV ECHO MEAS - TR MAX VEL: 214 CM/SEC
BH CV ECHO MEASUREMENTS AVERAGE E/E' RATIO: 32.07
BH CV XLRA - RV BASE: 3.2 CM
BH CV XLRA - RV LENGTH: 7.6 CM
BH CV XLRA - RV MID: 2 CM
BH CV XLRA - TDI S': 8.2 CM/SEC
BILIRUB SERPL-MCNC: 1.2 MG/DL (ref 0–1.2)
BUN SERPL-MCNC: 24 MG/DL (ref 8–23)
BUN/CREAT SERPL: 34.8 (ref 7–25)
CALCIUM SPEC-SCNC: 8.9 MG/DL (ref 8.6–10.5)
CHLORIDE SERPL-SCNC: 103 MMOL/L (ref 98–107)
CO2 SERPL-SCNC: 24 MMOL/L (ref 22–29)
CREAT SERPL-MCNC: 0.69 MG/DL (ref 0.76–1.27)
CRP SERPL-MCNC: 4.36 MG/DL (ref 0–0.5)
DEPRECATED RDW RBC AUTO: 38.6 FL (ref 37–54)
EOSINOPHIL # BLD AUTO: 0.01 10*3/MM3 (ref 0–0.4)
EOSINOPHIL NFR BLD AUTO: 0.1 % (ref 0.3–6.2)
ERYTHROCYTE [DISTWIDTH] IN BLOOD BY AUTOMATED COUNT: 13.2 % (ref 12.3–15.4)
ERYTHROCYTE [SEDIMENTATION RATE] IN BLOOD: 4 MM/HR (ref 0–20)
GFR SERPL CREATININE-BSD FRML MDRD: 110 ML/MIN/1.73
GLOBULIN UR ELPH-MCNC: 2.4 GM/DL
GLUCOSE SERPL-MCNC: 125 MG/DL (ref 65–99)
HCT VFR BLD AUTO: 41.7 % (ref 37.5–51)
HGB BLD-MCNC: 14 G/DL (ref 13–17.7)
IMM GRANULOCYTES # BLD AUTO: 0.06 10*3/MM3 (ref 0–0.05)
IMM GRANULOCYTES NFR BLD AUTO: 0.5 % (ref 0–0.5)
INR PPP: 3.1 (ref 0.9–1.1)
LEFT ATRIUM VOLUME INDEX: 36 ML/M2
LEFT ATRIUM VOLUME: 73 CM3
LYMPHOCYTES # BLD AUTO: 1.15 10*3/MM3 (ref 0.7–3.1)
LYMPHOCYTES NFR BLD AUTO: 10.2 % (ref 19.6–45.3)
MCH RBC QN AUTO: 27.3 PG (ref 26.6–33)
MCHC RBC AUTO-ENTMCNC: 33.6 G/DL (ref 31.5–35.7)
MCV RBC AUTO: 81.4 FL (ref 79–97)
MONOCYTES # BLD AUTO: 0.99 10*3/MM3 (ref 0.1–0.9)
MONOCYTES NFR BLD AUTO: 8.8 % (ref 5–12)
NEUTROPHILS NFR BLD AUTO: 79.9 % (ref 42.7–76)
NEUTROPHILS NFR BLD AUTO: 9.01 10*3/MM3 (ref 1.7–7)
NRBC BLD AUTO-RTO: 0 /100 WBC (ref 0–0.2)
PLATELET # BLD AUTO: 285 10*3/MM3 (ref 140–450)
PMV BLD AUTO: 9.9 FL (ref 6–12)
POTASSIUM SERPL-SCNC: 4.2 MMOL/L (ref 3.5–5.2)
PROT SERPL-MCNC: 6.6 G/DL (ref 6–8.5)
PROTHROMBIN TIME: 31.7 SECONDS (ref 11.7–14.2)
QT INTERVAL: 343 MS
QT INTERVAL: 385 MS
RBC # BLD AUTO: 5.12 10*6/MM3 (ref 4.14–5.8)
SODIUM SERPL-SCNC: 139 MMOL/L (ref 136–145)
WBC # BLD AUTO: 11.28 10*3/MM3 (ref 3.4–10.8)

## 2021-10-11 PROCEDURE — 93010 ELECTROCARDIOGRAM REPORT: CPT | Performed by: INTERNAL MEDICINE

## 2021-10-11 PROCEDURE — 80053 COMPREHEN METABOLIC PANEL: CPT | Performed by: HOSPITALIST

## 2021-10-11 PROCEDURE — 85610 PROTHROMBIN TIME: CPT | Performed by: HOSPITALIST

## 2021-10-11 PROCEDURE — 63710000001 PREDNISONE PER 5 MG: Performed by: HOSPITALIST

## 2021-10-11 PROCEDURE — 25010000002 CEFTRIAXONE PER 250 MG: Performed by: HOSPITALIST

## 2021-10-11 PROCEDURE — 86140 C-REACTIVE PROTEIN: CPT | Performed by: HOSPITALIST

## 2021-10-11 PROCEDURE — 85652 RBC SED RATE AUTOMATED: CPT | Performed by: HOSPITALIST

## 2021-10-11 PROCEDURE — 99232 SBSQ HOSP IP/OBS MODERATE 35: CPT | Performed by: INTERNAL MEDICINE

## 2021-10-11 PROCEDURE — 93306 TTE W/DOPPLER COMPLETE: CPT | Performed by: INTERNAL MEDICINE

## 2021-10-11 PROCEDURE — 71046 X-RAY EXAM CHEST 2 VIEWS: CPT

## 2021-10-11 PROCEDURE — 93306 TTE W/DOPPLER COMPLETE: CPT

## 2021-10-11 PROCEDURE — 85025 COMPLETE CBC W/AUTO DIFF WBC: CPT | Performed by: HOSPITALIST

## 2021-10-11 PROCEDURE — 92610 EVALUATE SWALLOWING FUNCTION: CPT

## 2021-10-11 PROCEDURE — 93005 ELECTROCARDIOGRAM TRACING: CPT | Performed by: INTERNAL MEDICINE

## 2021-10-11 RX ADMIN — SODIUM CHLORIDE, PRESERVATIVE FREE 10 ML: 5 INJECTION INTRAVENOUS at 20:18

## 2021-10-11 RX ADMIN — METOPROLOL TARTRATE 12.5 MG: 25 TABLET, FILM COATED ORAL at 08:37

## 2021-10-11 RX ADMIN — FINASTERIDE 5 MG: 5 TABLET, FILM COATED ORAL at 08:37

## 2021-10-11 RX ADMIN — LEFLUNOMIDE 10 MG: 20 TABLET ORAL at 08:37

## 2021-10-11 RX ADMIN — Medication 220 MG: at 08:37

## 2021-10-11 RX ADMIN — MULTIPLE VITAMINS W/ MINERALS TAB 1 TABLET: TAB at 08:37

## 2021-10-11 RX ADMIN — WARFARIN 5 MG: 5 TABLET ORAL at 17:06

## 2021-10-11 RX ADMIN — SODIUM CHLORIDE, PRESERVATIVE FREE 10 ML: 5 INJECTION INTRAVENOUS at 08:38

## 2021-10-11 RX ADMIN — ASPIRIN 81 MG: 81 TABLET, COATED ORAL at 08:37

## 2021-10-11 RX ADMIN — METOPROLOL TARTRATE 12.5 MG: 25 TABLET, FILM COATED ORAL at 20:17

## 2021-10-11 RX ADMIN — OXYCODONE HYDROCHLORIDE AND ACETAMINOPHEN 1000 MG: 500 TABLET ORAL at 08:37

## 2021-10-11 RX ADMIN — AZITHROMYCIN DIHYDRATE 250 MG: 250 TABLET, FILM COATED ORAL at 08:37

## 2021-10-11 RX ADMIN — PREDNISONE 5 MG: 10 TABLET ORAL at 08:37

## 2021-10-11 RX ADMIN — CEFTRIAXONE 1 G: 1 INJECTION, POWDER, FOR SOLUTION INTRAMUSCULAR; INTRAVENOUS at 17:06

## 2021-10-11 RX ADMIN — PANTOPRAZOLE SODIUM 40 MG: 40 TABLET, DELAYED RELEASE ORAL at 06:20

## 2021-10-11 NOTE — PROGRESS NOTES
"DAILY PROGRESS NOTE  [facility]    Patient Identification:  Name: Bobby L Tapley  Age: 81 y.o.  Sex: male  :  1940  MRN: [id number]         Primary Care Physician: Serenity Adam MD      Subjective  Patient with no new complaints.  Feels about the same.    Objective:  General Appearance:  Comfortable, well-appearing, in no acute distress and not in pain.    Vital signs: (most recent): Blood pressure 122/65, pulse 106, temperature 97.6 °F (36.4 °C), temperature source Oral, resp. rate 24, height 180.3 cm (70.98\"), weight 68.3 kg (150 lb 9.2 oz), SpO2 91 %.    Lungs:  Normal effort and normal respiratory rate.  He is not in respiratory distress.  No stridor.  There are rales.  No decreased breath sounds, wheezes or rhonchi.  (Fibrotic sounding rales, right more prominent than left.)  Heart: Tachycardia.  Regular rhythm.  Positive for murmur.  (Very harsh 3/6 systolic murmur throughout the precordium.  Loudest upper sternal area.  Prosthetic click is present.)  Extremities: There is no dependent edema.    Neurological: Patient is alert and oriented to person, place and time.    Skin:  Warm and dry.                Vital signs in last 24 hours:  Temp:  [97.4 °F (36.3 °C)-98.9 °F (37.2 °C)] 97.6 °F (36.4 °C)  Heart Rate:  [] 106  Resp:  [18-28] 24  BP: ()/(54-75) 122/65    Intake/Output:    Intake/Output Summary (Last 24 hours) at 10/11/2021 191  Last data filed at 10/11/2021 1300  Gross per 24 hour   Intake 480 ml   Output --   Net 480 ml         Results from last 7 days   Lab Units 10/11/21  0717 10/10/21  0612 10/09/21  1125   WBC 10*3/mm3 11.28* 9.11 7.84   HEMOGLOBIN g/dL 14.0 13.4 12.4*   PLATELETS 10*3/mm3 285 215 186     Results from last 7 days   Lab Units 10/11/21  0717 10/10/21  0612 10/09/21  1125   SODIUM mmol/L 139 139 137   POTASSIUM mmol/L 4.2 4.5 4.1   CHLORIDE mmol/L 103 103 102   CO2 mmol/L 24.0 24.9 24.4   BUN mg/dL 24* 22 27*   CREATININE mg/dL 0.69* 0.58* 0.61* "   GLUCOSE mg/dL 125* 99 113*   Estimated Creatinine Clearance: 70 mL/min (A) (by C-G formula based on SCr of 0.69 mg/dL (L)).  Results from last 7 days   Lab Units 10/11/21  0717 10/10/21  0612 10/09/21  1125   CALCIUM mg/dL 8.9 9.1 9.0   ALBUMIN g/dL 4.20 4.00 4.00     Results from last 7 days   Lab Units 10/11/21  0717 10/10/21  0612 10/09/21  1125   ALBUMIN g/dL 4.20 4.00 4.00   BILIRUBIN mg/dL 1.2 1.1 1.2   ALK PHOS U/L 47 45 45   AST (SGOT) U/L 30 25 25   ALT (SGPT) U/L 20 16 19       Assessment:  Acute hypoxemic respiratory failure (HCC): Associated with a new pulmonary infiltrate and pleural effusions.  See discussion below.     Pulmonary infiltrate:  Procalcitonin level normal.  WBC initially normal.  Slight increase today.  Still afebrile.  Respiratory panel negative.  This does not appear to be a typical bacterial pneumonia.  Very dry sounding rales on auscultation.   Possibilities include rheumatoid arthritis although I expect to see both sides involved to some degree.  Arava is also associated with interstitial lung disease.  Consider possibility of pneumonitis secondary to aspiration.    Presently on empiric antibiotics.  Pulmonary consult appreciated.  Patient is declines bronchoscopy.    Chest x-ray with possible slight worsening of right infiltrate.    History of prosthetic aortic valve -mechanical: Continue anticoagulation.    JOY (obstructive sleep apnea)    Coronary artery disease    Tachycardia:   Improved with switching ACE inhibitor to metoprolol.  Still tacky.    Inverted T waves anterior septal leads: No anginal type pain and normal troponin levels.    Cardiology input appreciated.  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  Cardiology input appreciated.  Awaiting cardiovascular opinion.    Plan:  Please see above.    London Robles MD  10/11/2021  19:17 EDT

## 2021-10-11 NOTE — PLAN OF CARE
Goal Outcome Evaluation:      Pt is A&Ox4, up ad sergio and started on 4 L O2 n/c (baseline is R/A) but has been titrated down to 2 L n/c.  HR elevated, metoprolol started this shift.  Pt on Coumadin for hx of mechanical aortic valve replacement.  EKGs showing abnormal and ST.  Transthoracic echo ordered today along with CXR PA & lateral.  Pt denies pain, will CTM.           Problem: Adult Inpatient Plan of Care  Goal: Plan of Care Review  Outcome: Ongoing, Progressing  Goal: Optimal Comfort and Wellbeing  Outcome: Ongoing, Progressing  Intervention: Provide Person-Centered Care  Description: Use a family-focused approach to care.  Develop trust and rapport by proactively providing information, encouraging questions, addressing concerns and offering reassurance.  Acknowledge emotional response to hospitalization.  Recognize and utilize personal coping strategies.  Honor spiritual and cultural preferences.  Recent Flowsheet Documentation  Taken 10/11/2021 0000 by Milana Alfonso RN  Trust Relationship/Rapport:   care explained   questions encouraged   reassurance provided   thoughts/feelings acknowledged   questions answered  Taken 10/10/2021 2000 by Milana Alfonso RN  Trust Relationship/Rapport:   care explained   questions encouraged   thoughts/feelings acknowledged   reassurance provided   empathic listening provided   emotional support provided  Goal: Readiness for Transition of Care  Outcome: Ongoing, Progressing     Problem: COPD Comorbidity  Goal: Maintenance of COPD Symptom Control  Outcome: Ongoing, Progressing  Intervention: Maintain COPD-Symptom Control  Description: Evaluate adherence to management plan (e.g., medication, trigger avoidance, infection prevention, self-monitoring).  Advocate for continuation of home regimen, including medication, method of delivery, schedule and symptom monitoring.  Anticipate the need for breathing techniques and activity pacing to minimize fatigue and  breathlessness.  Assess for proper use of inhaled medication and delivery technique; assist or reinstruct if needed.  Evaluate effectiveness of coping skills; encourage expression of feelings, expectations and concerns related to disease management and quality of life; reinforce education to enhance health management plan and wellbeing.  Recent Flowsheet Documentation  Taken 10/11/2021 0600 by Milana Alfonso RN  Medication Review/Management: medications reviewed  Taken 10/11/2021 0400 by Milana Alfonso RN  Medication Review/Management: medications reviewed  Taken 10/11/2021 0200 by Milana Alfonso RN  Medication Review/Management:   medications reviewed   high-risk medications identified  Taken 10/11/2021 0000 by Milana Alfonso RN  Medication Review/Management:   medications reviewed   high-risk medications identified  Taken 10/10/2021 2200 by Milana Alfonso RN  Medication Review/Management:   medications reviewed   high-risk medications identified  Taken 10/10/2021 2000 by Milana Alfonso RN  Medication Review/Management:   medications reviewed   high-risk medications identified     Problem: Fluid Imbalance (Pneumonia)  Goal: Fluid Balance  Outcome: Ongoing, Progressing  Intervention: Monitor and Manage Fluid Balance  Description: Assess fluid requirements to determine goal-directed fluid therapy.  Keep accurate intake, output and daily weight; monitor trends.  Monitor laboratory value trends and need for treatment adjustment.  Encourage oral intake when able; if not able to meet requirements, determine need for intravenous fluid therapy to achieve fluid balance.  Evaluate potential sources that may lead to fluid overload (e.g., oral, enteral, intravenous fluid, medication).  Evaluate need for and response to fluid restriction.  Anticipate need for diuretic agent; monitor effects if administered (e.g., blood pressure change, dysrhythmia, electrolyte alteration).  Monitor and maintain skin integrity;  avoid constrictive devices and clothing if edema present.  Recent Flowsheet Documentation  Taken 10/11/2021 0000 by Milana Alfonso, RN  Fluid/Electrolyte Management: fluids provided  Taken 10/10/2021 2000 by Milana Alfonso, RN  Fluid/Electrolyte Management: fluids provided     Problem: Chest Pain  Goal: Resolution of Chest Pain Symptoms  Outcome: Ongoing, Progressing

## 2021-10-11 NOTE — THERAPY EVALUATION
Acute Care - Speech Language Pathology   Swallow Initial Evaluation [facility]     Patient Name: Bobby L Tapley  : 1940  MRN: [id number]  Today's Date: 10/11/2021               Admit Date: 10/9/2021    Visit Dx:     ICD-10-CM ICD-9-CM   1. Acute hypoxemic respiratory failure (HCC)  J96.01 518.81   2. Community acquired pneumonia of right lung, unspecified part of lung  J18.9 486     Patient Active Problem List   Diagnosis   • History of prosthetic aortic valve   • BP (high blood pressure)   • HLD (hyperlipidemia)   • Hx of CABG   • hx of mechanical AV conduit ([id number] 23 mm) by Dr. Santana Bailey at [facility] 2004   • JOY (obstructive sleep apnea)   • Hypersomnia with sleep apnea   • Weight loss   • Witnessed episode of apnea   • BPH (benign prostatic hyperplasia)   • Chronic headaches   • CTS (carpal tunnel syndrome)   • Dyslipidemia   • High frequency hearing loss   • Mechanical heart valve present   • Nephrolithiasis   • Osteoarthritis   • Sleep related hypoxia   • Situational depression   • Subcutaneous lipoma   • Leg pain, right   • Aortic arch aneurysm (HCC)   • Coronary artery disease   • Memory loss   • Acute hypoxemic respiratory failure (HCC)   • Pulmonary infiltrate   • Tachycardia     Past Medical History:   Diagnosis Date   • Aneurysm of aortic arch (HCC)    • Aortic insufficiency    • Aortic stenosis    • Arthritis    • Ascending aortic aneurysm (HCC)    • CAD (coronary artery disease)    • Enlarged prostate    • H/O aortic valve replacement    • H/O echocardiogram 02/10/2015   • Health care maintenance    • History of EKG 2015   • Hyperlipidemia    • Hypertension    • Kidney stones    • JOY on CPAP    • Precordial pain    • Shortness of breath      Past Surgical History:   Procedure Laterality Date   • AORTIC VALVE REPAIR/REPLACEMENT     • CARDIAC CATHETERIZATION  2013   • CARDIAC CATHETERIZATION N/A 2021    Procedure: Coronary angiography;  Surgeon: Piedad  MD Radha;  Location: [facility] CATH INVASIVE LOCATION;  Service: Cardiovascular;  Laterality: N/A;   • COLONOSCOPY N/A 8/15/2017    Procedure: COLONOSCOPY;  Surgeon: Hu Bergman MD;  Location: [facility] ENDOSCOPY;  Service:    • CORONARY ARTERY BYPASS GRAFT     • ROTATOR CUFF REPAIR         SLP Recommendation and Plan  SLP Swallowing Diagnosis: R/O pharyngeal dysphagia (10/11/21 1300)  SLP Diet Recommendation: regular textures, thin liquids (10/11/21 1300)  Recommended Precautions and Strategies: upright posture during/after eating, small bites of food and sips of liquid (10/11/21 1300)  SLP Rec. for Method of Medication Administration: meds whole, as tolerated (10/11/21 1300)     Monitor for Signs of Aspiration: yes (10/11/21 1300)  Recommended Diagnostics: VFSS (MBS) (10/11/21 1300)  Swallow Criteria for Skilled Therapeutic Interventions Met: demonstrates skilled criteria (10/11/21 1300)  Anticipated Discharge Disposition (SLP): unknown (10/11/21 1300)  Rehab Potential/Prognosis, Swallowing: good, to achieve stated therapy goals (10/11/21 1300)  Therapy Frequency (Swallow): PRN (10/11/21 1300)  Predicted Duration Therapy Intervention (Days): until discharge (10/11/21 1300)                         Plan of Care Reviewed With: patient, spouse  Outcome Summary: Clinical swallow eval completed. Pt c/o decreased taste, and his wife reported poor intake at home. Pt tolerated trials of thins (cup/straw), pureed, soft/mixed/reguar solids with no overt s/s of aspiration. Mastication was slow due to missing teeth. Laryngeal elevation was adequate with palpation. Recommend pt continue with a regular diet with thin; meds as tolerated; upright for meals and 30 min after; slow rate. Given concern for aspiration and location of PNA, will schedule VFSS to r/o silent aspiration.    Patient was not wearing a face mask during this therapy encounter. Therapist used appropriate personal protective equipment including mask, eye  protection and gloves.  Mask used was standard procedure mask. Appropriate PPE was worn during the entire therapy session. Hand hygiene was completed before and after therapy session. Patient is not in enhanced droplet precautions.     SWALLOW EVALUATION (last 72 hours)     SLP Adult Swallow Evaluation     Row Name 10/11/21 1300                   Rehab Evaluation    Document Type evaluation  -AW        Subjective Information no complaints  -AW        Patient Observations alert; cooperative; agree to therapy  -AW        Patient/Family/Caregiver Comments/Observations Pt's wife present.  -AW        Care Plan Review evaluation/treatment results reviewed; care plan/treatment goals reviewed; risks/benefits reviewed; current/potential barriers reviewed; patient/other agree to care plan  -AW        Care Plan Review, Other Participant(s) spouse  -AW        Patient Effort good  -AW        Symptoms Noted During/After Treatment none  -AW                  General Information    Patient Profile Reviewed yes  -AW        Pertinent History Of Current Problem Pt admitted with respiratory failure and PNA.  -AW        Current Method of Nutrition regular textures; thin liquids  -AW        Precautions/Limitations, Vision WFL; for purposes of eval  -AW        Precautions/Limitations, Hearing WFL; for purposes of eval  -AW        Prior Level of Function-Communication WFL  -AW        Prior Level of Function-Swallowing no diet consistency restrictions  -AW        Plans/Goals Discussed with patient; spouse/S.O.; agreed upon  -AW        Barriers to Rehab none identified  -AW        Patient's Goals for Discharge return home  -AW        Family Goals for Discharge family did not state  -AW                  Pain    Additional Documentation Pain Scale: Numbers Pre/Post-Treatment (Group)  -AW                  Pain Scale: Numbers Pre/Post-Treatment    Pretreatment Pain Rating 0/10 - no pain  -AW        Posttreatment Pain Rating 0/10 - no pain  -AW                   Oral Motor Structure and Function    Dentition Assessment natural, present and adequate; missing teeth  -AW        Secretion Management WNL/WFL  -AW        Mucosal Quality moist, healthy  -AW        Volitional Swallow WFL  -AW        Volitional Cough WFL  -AW                  Oral Musculature and Cranial Nerve Assessment    Oral Motor General Assessment WFL  -AW                  General Eating/Swallowing Observations    Respiratory Support Currently in Use nasal cannula  -AW        Eating/Swallowing Skills fed by SLP; self-fed  -AW        Positioning During Eating upright 90 degree  -AW        Utensils Used spoon; cup; straw  -AW        Consistencies Trialed regular textures; soft textures; mixed consistency; pureed; thin liquids  -AW        Pre SpO2 (%) 95  -AW        Post SpO2 (%) 95  -AW                  Clinical Swallow Eval    Clinical Swallow Evaluation Summary Clinical swallow eval completed. Pt c/o decreased taste, and his wife reported poor intake at home. Pt tolerated trials of thins (cup/straw), pureed, soft/mixed/reguar solids with no overt s/s of aspiration. Mastication was slow due to missing teeth. Laryngeal elevation was adequate with palpation. Recommend pt continue with a regular diet with thin; meds as tolerated; upright for meals and 30 min after; slow rate. Given concern for aspiration and location of PNA, will schedule VFSS to r/o silent aspiration.  -AW                  Clinical Impression    SLP Swallowing Diagnosis R/O pharyngeal dysphagia  -AW        Functional Impact risk of aspiration/pneumonia  -AW        Rehab Potential/Prognosis, Swallowing good, to achieve stated therapy goals  -AW        Swallow Criteria for Skilled Therapeutic Interventions Met demonstrates skilled criteria  -AW                  Recommendations    Therapy Frequency (Swallow) PRN  -AW        Predicted Duration Therapy Intervention (Days) until discharge  -AW        SLP Diet Recommendation regular  textures; thin liquids  -AW        Recommended Diagnostics VFSS (MBS)  -AW        Recommended Precautions and Strategies upright posture during/after eating; small bites of food and sips of liquid  -AW        Oral Care Recommendations Oral Care BID/PRN  -AW        SLP Rec. for Method of Medication Administration meds whole; as tolerated  -AW        Monitor for Signs of Aspiration yes  -AW        Anticipated Discharge Disposition (SLP) unknown  -AW                  Swallow Goals (SLP)    Oral Nutrition/Hydration Goal Selection (SLP) oral nutrition/hydration, SLP goal 1  -AW                  Oral Nutrition/Hydration Goal 1 (SLP)    Oral Nutrition/Hydration Goal 1, SLP Pt will safely tolerate the least restrictive diet with no s/s of aspiration.  -AW        Time Frame (Oral Nutrition/Hydration Goal 1, SLP) by discharge  -AW              User Key  (r) = Recorded By, (t) = Taken By, (c) = Cosigned By    Initials Name Effective Dates    Wendi Dave MS CCC-SLP 06/16/21 -                 EDUCATION  The patient has been educated in the following areas:   Dysphagia (Swallowing Impairment) Oral Care/Hydration.        SLP GOALS     Row Name 10/11/21 1300             Oral Nutrition/Hydration Goal 1 (SLP)    Oral Nutrition/Hydration Goal 1, SLP Pt will safely tolerate the least restrictive diet with no s/s of aspiration.  -AW      Time Frame (Oral Nutrition/Hydration Goal 1, SLP) by discharge  -AW            User Key  (r) = Recorded By, (t) = Taken By, (c) = Cosigned By    Initials Name Provider Type    Wendi Dave MS CCC-SLP Speech and Language Pathologist              SLP Outcome Measures (last 72 hours)     SLP Outcome Measures     Row Name 10/11/21 1300             SLP Outcome Measures    Outcome Measure Used? Adult NOMS  -AW              Adult FCM Scores    FCM Chosen Swallowing  -AW      Swallowing FCM Score 7  -AW            User Key  (r) = Recorded By, (t) = Taken By, (c) = Cosigned By    Initials Name Effective  Dates    Wendi Dave MS CCC-SLP 06/16/21 -                  Time Calculation:    Time Calculation- SLP     Row Name 10/11/21 1316             Time Calculation- SLP    SLP Start Time 1115  -      SLP Received On 10/11/21  -            User Key  (r) = Recorded By, (t) = Taken By, (c) = Cosigned By    Initials Name Provider Type    Wendi Dave MS CCC-SLP Speech and Language Pathologist                Therapy Charges for Today     Code Description Service Date Service Provider Modifiers Qty    [id number]  ST EVAL ORAL PHARYNG SWALLOW 4 10/11/2021 Wendi Myers MS CCC-SLP GN 1               Wendi Myers MS CCC-SLP  10/11/2021

## 2021-10-11 NOTE — PLAN OF CARE
Goal Outcome Evaluation:           Progress: improving   Patient A&O x4, VSS, on 2 L NC. Patient up ad sergio in room with no complaints of pain. CXR and echo done today. Wife at bedside for most of the afternoon. Patient did attempt to eat lunch today with a lot of encouragement. No new issues noted. WCTM

## 2021-10-11 NOTE — PLAN OF CARE
Goal Outcome Evaluation:  Plan of Care Reviewed With: patient, spouse           Outcome Summary: Clinical swallow eval completed. Pt c/o decreased taste, and his wife reported poor intake at home. Pt tolerated trials of thins (cup/straw), pureed, soft/mixed/reguar solids with no overt s/s of aspiration. Mastication was slow due to missing teeth. Laryngeal elevation was adequate with palpation. Recommend pt continue with a regular diet with thin; meds as tolerated; upright for meals and 30 min after; slow rate. Given concern for aspiration and location of PNA, will schedule VFSS to r/o silent aspiration.

## 2021-10-11 NOTE — CASE MANAGEMENT/SOCIAL WORK
Discharge Planning Assessment  [facility]     Patient Name: Bobby L Tapley  MRN: [id number]  Today's Date: 10/11/2021    Admit Date: 10/9/2021     Discharge Needs Assessment     Row Name 10/11/21 0927       Living Environment    Lives With child(yarely), adult; spouse    Name(s) of Who Lives With Patient Spouse  (Barbara Tapley  [phone]) and daughter  (Bedena Tapley [phone])    Current Living Arrangements home/apartment/condo    Primary Care Provided by self    Provides Primary Care For no one    Family Caregiver if Needed child(yarely), adult; spouse    Family Caregiver Names Spouse  (Barbara Tapley  [phone]) and daughter  (Bedena Tapley [phone])    Quality of Family Relationships helpful; involved; supportive    Able to Return to Prior Arrangements yes    Living Arrangement Comments Pt lives in a single story house with spouse  (Barbara Tapley  [phone]) and daughter  (Bedena Tapley [phone]).       Resource/Environmental Concerns    Resource/Environmental Concerns none    Transportation Concerns car, none       Transition Planning    Patient/Family Anticipates Transition to home with family    Patient/Family Anticipated Services at Transition none    Transportation Anticipated family or friend will provide       Discharge Needs Assessment    Readmission Within the Last 30 Days no previous admission in last 30 days    Equipment Currently Used at Home cane, straight; grab bar; shower chair; walker, rolling    Concerns to be Addressed no discharge needs identified; denies needs/concerns at this time    Anticipated Changes Related to Illness none    Equipment Needed After Discharge cane, straight; grab bar, tub/shower; shower chair; walker, rolling               Discharge Plan     Row Name 10/11/21 6930       Plan    Plan Plan home with family.   LAUREN Hamilton RN    Patient/Family in Agreement with Plan yes    Plan Comments FACE SHEET VERIFIED/ IM LETTER SIGNED.   Spoke with pt at bedside.   Pt's PCP is Dr. Serenity Adam.  Pt lives in a single story house with spouse  (Barbara Tapley  [phone]) and daughter  (Bedena Tapley [phone]).  Pt is independent with ADLs.  Pt gets his prescriptions at [facility]  ( [facility]).  Pt denies any issues affording medications.  Pt is not current with HH.  Pt has not been in SNF.  Pt states his family can assist him at home if needed and transport him home.  Plan home with family.   LAUREN Hamilton RN              Continued Care and Services - Admitted Since 10/9/2021    Coordination has not been started for this encounter.          Demographic Summary     Row Name 10/11/21 0927       General Information    Admission Type inpatient    Arrived From emergency department    Required Notices Provided Important Message from Medicare    Referral Source admission list    Reason for Consult discharge planning    Preferred Language English     Used During This Interaction no               Functional Status     Row Name 10/11/21 0927       Functional Status    Usual Activity Tolerance moderate    Current Activity Tolerance fair       Functional Status, IADL    Medications independent    Meal Preparation assistive person    Housekeeping assistive person    Laundry assistive person    Shopping assistive person       Mental Status    General Appearance WDL WDL               Psychosocial    No documentation.                Abuse/Neglect    No documentation.                Legal    No documentation.                Substance Abuse    No documentation.                Patient Forms    No documentation.                   Saida Hamilton, RN

## 2021-10-11 NOTE — PROGRESS NOTES
"                                              LOS: 2 days   Patient Care Team:  Serenity Adam MD as PCP - General (Family Medicine)  Donya Gagnon MD as Consulting Physician (Cardiology)  Hoa Walker MD as Consulting Physician (Internal Medicine)  Mendez Cox MD as Surgeon (Cardiothoracic Surgery)  Wayne Garcia MD as Consulting Physician (Hematology and Oncology)    Chief Complaint:  F/up pneumonia, COPD and medical problems listed below    Subjective   Interval History  I reviewed the admission note, progress notes, PMH, PSH, Family hx, social history, imagings and prior records on this admission, summarized the finding in my note and formulated a transition of care plan.     Reported mild cough with occasional bloody phlegm.  No shortness of breath at rest but on activities.  On oxygen 2 L/min.    REVIEW OF SYSTEMS:   CARDIOVASCULAR: No chest pain, chest pressure or chest discomfort. No palpitations or edema.   GASTROINTESTINAL: No anorexia, nausea, vomiting or diarrhea. No abdominal pain.  CONSTITUTIONAL: No fever or chills.     Ventilator/Non-Invasive Ventilation Settings (From admission, onward)            None                Physical Exam:     Vital Signs  Temp:  [97.4 °F (36.3 °C)-98.9 °F (37.2 °C)] 97.8 °F (36.6 °C)  Heart Rate:  [] 104  Resp:  [18-28] 18  BP: ()/(54-75) 101/59    Intake/Output Summary (Last 24 hours) at 10/11/2021 1449  Last data filed at 10/11/2021 1300  Gross per 24 hour   Intake 480 ml   Output --   Net 480 ml     Flowsheet Rows      First Filed Value   Admission Height 180.3 cm (70.98\") Documented at 10/10/2021 0454   Admission Weight 68.3 kg (150 lb 9.2 oz) Documented at 10/10/2021 0454          PPE used per hospital policy    General Appearance:   Alert, cooperative, in no acute distress   ENMT:  Mallampati score 2, moist mucous membrane   Eyes:  Pupils equal and reactive to light. EOMI   Neck:   Trachea midline. No thyromegaly.   Lungs:    " Diminished throughout.  Coarse breath sounds at the bases.  No wheezing.  No use of accessory muscles    Heart:   Regular rhythm and normal rate, normal S1 and S2, no         murmur   Skin:   No rash   Abdomen:     Soft. No tenderness. No HSM.   Neuro:  Conscious, alert, oriented x3. Strength 5/5 in upper and lower  ext   Extremities:  No cyanosis, clubbing or edema.  Warm extremities and well-perfused          Results Review:        Results from last 7 days   Lab Units 10/11/21  0717 10/10/21  0612 10/10/21  0612 10/09/21  1125 10/09/21  1125   SODIUM mmol/L 139  --  139  --  137   POTASSIUM mmol/L 4.2  --  4.5  --  4.1   CHLORIDE mmol/L 103  --  103  --  102   CO2 mmol/L 24.0  --  24.9  --  24.4   BUN mg/dL 24*  --  22  --  27*   CREATININE mg/dL 0.69*  --  0.58*  --  0.61*   GLUCOSE mg/dL 125*   < > 99   < > 113*   CALCIUM mg/dL 8.9  --  9.1  --  9.0    < > = values in this interval not displayed.     Results from last 7 days   Lab Units 10/10/21  0612 10/09/21  1125   TROPONIN T ng/mL 0.018 0.018     Results from last 7 days   Lab Units 10/11/21  0717 10/10/21  0612 10/09/21  1125   WBC 10*3/mm3 11.28* 9.11 7.84   HEMOGLOBIN g/dL 14.0 13.4 12.4*   HEMATOCRIT % 41.7 42.8 38.2   PLATELETS 10*3/mm3 285 215 186     Results from last 7 days   Lab Units 10/11/21  0717 10/10/21  0612 10/09/21  2036   INR  3.10* 3.06* 3.55*     Results from last 7 days   Lab Units 10/09/21  1125   PROBNP pg/mL 2,516.0*             Results from last 7 days   Lab Units 10/11/21  0717   CRP mg/dL 4.36*               I reviewed the patient's new clinical results.        Medication Review:   ascorbic acid, 1,000 mg, Oral, Daily  aspirin, 81 mg, Oral, Daily  azithromycin, 250 mg, Oral, Q24H  cefTRIAXone, 1 g, Intravenous, Q24H  finasteride, 5 mg, Oral, Daily  leflunomide, 10 mg, Oral, Daily  metoprolol tartrate, 12.5 mg, Oral, Q12H  multivitamin with minerals, 1 tablet, Oral, Daily  pantoprazole, 40 mg, Oral, QAM  predniSONE, 5 mg, Oral,  Daily  senna, 2 tablet, Oral, Nightly  sodium chloride, 10 mL, Intravenous, Q12H  [START ON 10/12/2021] warfarin, 2.5 mg, Oral, Once per day on Tue  warfarin, 5 mg, Oral, Once per day on Sun Mon Wed Thu Fri Sat  zinc sulfate, 220 mg, Oral, Daily        Pharmacy to dose warfarin,         Diagnostic imaging:  I personally and independently reviewed the following images:  CT chest 10/9/2021: RLL and RUL pulmonary infiltrates.  Upper lobe predominant emphysema.       Assessment     1. RUL/RLL infiltrates: Suggestive of pneumonia, including atypical infection due to the suppression.  Other possible etiologies include pneumonitis less likely malignancy (bronchoalveolar carcinoma could have similar presentation).  Could also represent ILD from RA or immunosuppressants.  2. COPD/emphysema, upper lobe predominant  3. Acute hypoxic respiratory failure, secondary to above  4. Rheumatoid arthritis, previously on steroid therapy but recently placed on leflunomide about 2 months ago    Pertinent medical problems:  · Prosthetic aortic valve    All problems new to me    Plan       · Agree with VFSS to evaluate for silent aspiration.  · Empiric antibiotic with Rocephin and azithromycin  · Follow-up imaging: Recommend CT chest in 4-6 weeks following completion of antibiotic therapy.  If persistent pulmonary infiltrates then he would require further evaluation including bronchoscopy.  Bronchoscopy has been offered to him yesterday by my partner and he declined and I also discussed that with him briefly and he would like to hold off.  · Bronchodilators with DuoNeb    Discussed with his wife    Cristo Larios MD  10/11/21  14:49 EDT          This note was dictated utilizing my[employer] dictation

## 2021-10-11 NOTE — CASE MANAGEMENT/SOCIAL WORK
Continued Stay Note  [facility]     Patient Name: Bobby L Tapley  MRN: [id number]  Today's Date: 10/11/2021    Admit Date: 10/9/2021     Discharge Plan     Row Name 10/11/21 0930       Plan    Plan Plan home with family.   LAUREN Hamilton RN    Patient/Family in Agreement with Plan yes    Plan Comments FACE SHEET VERIFIED/ IM LETTER SIGNED.   Spoke with pt at bedside.  Pt's PCP is Dr. Serenity Adam.  Pt lives in a single story house with spouse  (Barbara Tapley  [phone]) and daughter  (Bedena Tapley [phone]).  Pt is independent with ADLs.  [facility]  ( [address]).  Pt denies any issues affording medications.  Pt is not current with HH.  Pt has not been in SNF.  Pt states his family can assist him at home if needed and transport him home.  Plan home with family.   LAUREN Hamilton RN               Discharge Codes    No documentation.                     Saida Hamilton RN

## 2021-10-11 NOTE — PROGRESS NOTES
"   LOS: 2 days   Patient Care Team:  Serenity Adam MD as PCP - General (Family Medicine)  Donya Gagnon MD as Consulting Physician (Cardiology)  Hoa Walker MD as Consulting Physician (Internal Medicine)  Mendez Cox MD as Surgeon (Cardiothoracic Surgery)  Wayne Garcia MD as Consulting Physician (Hematology and Oncology)    Chief Complaint: soa     Interval History: Feels a bit better today. HR is coming down. He is receiving his medications with low normal BP.     Objective   Vital Signs  Temp:  [97.4 °F (36.3 °C)-98.9 °F (37.2 °C)] 97.8 °F (36.6 °C)  Heart Rate:  [] 104  Resp:  [18-28] 18  BP: ()/(54-75) 101/59    Intake/Output Summary (Last 24 hours) at 10/11/2021 1648  Last data filed at 10/11/2021 1300  Gross per 24 hour   Intake 480 ml   Output --   Net 480 ml       Last Weight and Admission Weight        10/10/21  0454   Weight: 68.3 kg (150 lb 9.2 oz)     Flowsheet Rows      First Filed Value   Admission Height 180.3 cm (70.98\") Documented at 10/10/2021 0454   Admission Weight 68.3 kg (150 lb 9.2 oz) Documented at 10/10/2021 0454        Physical Exam  Constitutional:       General: He is not in acute distress.     Comments: Very thin   HENT:      Head: Normocephalic.   Eyes:      Conjunctiva/sclera: Conjunctivae normal.   Cardiovascular:      Rate and Rhythm: Tachycardia present.      Heart sounds: Murmur heard.    Systolic murmur is present with a grade of 3/6.      Pulmonary:      Effort: Pulmonary effort is normal.      Breath sounds: Normal breath sounds.   Abdominal:      Palpations: Abdomen is soft.      Tenderness: There is no abdominal tenderness.   Musculoskeletal:      Right lower leg: No edema.      Left lower leg: No edema.   Skin:     General: Skin is warm and dry.   Neurological:      General: No focal deficit present.   Psychiatric:         Mood and Affect: Mood normal.         Results Review:      Results from last 7 days   Lab Units 10/11/21  0717 " 10/10/21  0612 10/10/21  0612 10/09/21  1125 10/09/21  1125   SODIUM mmol/L 139  --  139  --  137   POTASSIUM mmol/L 4.2  --  4.5  --  4.1   CHLORIDE mmol/L 103  --  103  --  102   CO2 mmol/L 24.0  --  24.9  --  24.4   BUN mg/dL 24*  --  22  --  27*   CREATININE mg/dL 0.69*  --  0.58*  --  0.61*   GLUCOSE mg/dL 125*   < > 99   < > 113*   CALCIUM mg/dL 8.9  --  9.1  --  9.0    < > = values in this interval not displayed.     Results from last 7 days   Lab Units 10/10/21  0612 10/09/21  1125   TROPONIN T ng/mL 0.018 0.018     Results from last 7 days   Lab Units 10/11/21  0717 10/10/21  0612 10/09/21  1125   WBC 10*3/mm3 11.28* 9.11 7.84   HEMOGLOBIN g/dL 14.0 13.4 12.4*   HEMATOCRIT % 41.7 42.8 38.2   PLATELETS 10*3/mm3 285 215 186     Results from last 7 days   Lab Units 10/11/21  0717 10/10/21  0612 10/09/21  2036   INR  3.10* 3.06* 3.55*                   I reviewed the patient's new clinical results.  I personally viewed and interpreted the patient's EKG/Telemetry data        Medication Review:   ascorbic acid, 1,000 mg, Oral, Daily  aspirin, 81 mg, Oral, Daily  azithromycin, 250 mg, Oral, Q24H  cefTRIAXone, 1 g, Intravenous, Q24H  finasteride, 5 mg, Oral, Daily  leflunomide, 10 mg, Oral, Daily  metoprolol tartrate, 12.5 mg, Oral, Q12H  multivitamin with minerals, 1 tablet, Oral, Daily  pantoprazole, 40 mg, Oral, QAM  predniSONE, 5 mg, Oral, Daily  senna, 2 tablet, Oral, Nightly  sodium chloride, 10 mL, Intravenous, Q12H  [START ON 10/12/2021] warfarin, 2.5 mg, Oral, Once per day on Tue  warfarin, 5 mg, Oral, Once per day on Sun Mon Wed Thu Fri Sat  zinc sulfate, 220 mg, Oral, Daily        Pharmacy to dose warfarin,         Assessment/Plan     1. PNA  2. Sinus tachycardia  3. 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 Stable CAD with recent cath showing no significant native disease (occluded SVG-LAD but the LAD had  NILESH only)  5. Low body weight    His heart rate is coming down nicely as his infection improves.  His EKG showed some T wave inversions, but he had a very recent cath that showed no significant native coronary disease.    An echo was performed today and shows normal left ventricular systolic function and wall motion.  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  This would explain his loud systolic murmur.  I have been unable to pull up his recent CTA as the computer that I am using has not connected to PACS.  I am going to touch base with Dr. Gagnon and consult Dr. Cox to review his recent CT as well.      Mata Santiago MD  10/11/21  16:48 EDT

## 2021-10-12 ENCOUNTER — APPOINTMENT (OUTPATIENT)
Dept: GENERAL RADIOLOGY | Facility: HOSPITAL | Age: 81
End: 2021-10-12

## 2021-10-12 VITALS
TEMPERATURE: 98 F | HEIGHT: 71 IN | BODY MASS INDEX: 21.08 KG/M2 | DIASTOLIC BLOOD PRESSURE: 60 MMHG | SYSTOLIC BLOOD PRESSURE: 118 MMHG | OXYGEN SATURATION: 90 % | RESPIRATION RATE: 16 BRPM | WEIGHT: 150.57 LBS | HEART RATE: 118 BPM

## 2021-10-12 LAB
BACTERIA SPEC RESP CULT: NORMAL
GRAM STN SPEC: NORMAL
INR PPP: 3.55 (ref 0.9–1.1)
PROTHROMBIN TIME: 35.2 SECONDS (ref 11.7–14.2)

## 2021-10-12 PROCEDURE — 99232 SBSQ HOSP IP/OBS MODERATE 35: CPT | Performed by: INTERNAL MEDICINE

## 2021-10-12 PROCEDURE — 25010000002 CEFTRIAXONE PER 250 MG: Performed by: HOSPITALIST

## 2021-10-12 PROCEDURE — 92611 MOTION FLUOROSCOPY/SWALLOW: CPT

## 2021-10-12 PROCEDURE — 85610 PROTHROMBIN TIME: CPT | Performed by: HOSPITALIST

## 2021-10-12 PROCEDURE — 63710000001 PREDNISONE PER 5 MG: Performed by: HOSPITALIST

## 2021-10-12 PROCEDURE — 74230 X-RAY XM SWLNG FUNCJ C+: CPT

## 2021-10-12 PROCEDURE — 99222 1ST HOSP IP/OBS MODERATE 55: CPT | Performed by: THORACIC SURGERY (CARDIOTHORACIC VASCULAR SURGERY)

## 2021-10-12 RX ORDER — CEFDINIR 300 MG/1
300 CAPSULE ORAL 2 TIMES DAILY
Qty: 10 CAPSULE | Refills: 0 | Status: SHIPPED | OUTPATIENT
Start: 2021-10-12 | End: 2021-10-27 | Stop reason: HOSPADM

## 2021-10-12 RX ADMIN — BARIUM SULFATE 50 ML: 400 SUSPENSION ORAL at 13:21

## 2021-10-12 RX ADMIN — MULTIPLE VITAMINS W/ MINERALS TAB 1 TABLET: TAB at 08:10

## 2021-10-12 RX ADMIN — SODIUM CHLORIDE, PRESERVATIVE FREE 10 ML: 5 INJECTION INTRAVENOUS at 20:24

## 2021-10-12 RX ADMIN — ASPIRIN 81 MG: 81 TABLET, COATED ORAL at 08:10

## 2021-10-12 RX ADMIN — SODIUM CHLORIDE, PRESERVATIVE FREE 10 ML: 5 INJECTION INTRAVENOUS at 08:10

## 2021-10-12 RX ADMIN — Medication 220 MG: at 08:10

## 2021-10-12 RX ADMIN — AZITHROMYCIN DIHYDRATE 250 MG: 250 TABLET, FILM COATED ORAL at 08:10

## 2021-10-12 RX ADMIN — BARIUM SULFATE 4 ML: 980 POWDER, FOR SUSPENSION ORAL at 13:20

## 2021-10-12 RX ADMIN — PREDNISONE 5 MG: 10 TABLET ORAL at 08:10

## 2021-10-12 RX ADMIN — CEFTRIAXONE 1 G: 1 INJECTION, POWDER, FOR SOLUTION INTRAMUSCULAR; INTRAVENOUS at 16:08

## 2021-10-12 RX ADMIN — FINASTERIDE 5 MG: 5 TABLET, FILM COATED ORAL at 08:10

## 2021-10-12 RX ADMIN — SENNOSIDES 2 TABLET: 8.6 TABLET, FILM COATED ORAL at 20:23

## 2021-10-12 RX ADMIN — BARIUM SULFATE 55 ML: 0.81 POWDER, FOR SUSPENSION ORAL at 13:20

## 2021-10-12 RX ADMIN — PANTOPRAZOLE SODIUM 40 MG: 40 TABLET, DELAYED RELEASE ORAL at 06:27

## 2021-10-12 RX ADMIN — METOPROLOL TARTRATE 12.5 MG: 25 TABLET, FILM COATED ORAL at 08:10

## 2021-10-12 RX ADMIN — LEFLUNOMIDE 10 MG: 20 TABLET ORAL at 08:10

## 2021-10-12 RX ADMIN — METOPROLOL TARTRATE 12.5 MG: 25 TABLET, FILM COATED ORAL at 20:23

## 2021-10-12 RX ADMIN — BARIUM SULFATE 1 TEASPOON(S): 0.6 CREAM ORAL at 13:20

## 2021-10-12 RX ADMIN — OXYCODONE HYDROCHLORIDE AND ACETAMINOPHEN 1000 MG: 500 TABLET ORAL at 08:10

## 2021-10-12 NOTE — PLAN OF CARE
Problem: Adult Inpatient Plan of Care  Goal: Plan of Care Review  10/12/2021 1819 by Radha Posadas RN  Outcome: Adequate for Care Transition  10/12/2021 1515 by Radha Posadas RN  Outcome: Ongoing, Progressing  Flowsheets (Taken 10/12/2021 1515)  Progress: no change  Plan of Care Reviewed With: patient  Goal: Patient-Specific Goal (Individualized)  10/12/2021 1819 by Radha Posadas RN  Outcome: Adequate for Care Transition  10/12/2021 1515 by Radha Posadas RN  Outcome: Ongoing, Progressing  Goal: Absence of Hospital-Acquired Illness or Injury  10/12/2021 1819 by Radha Posadas RN  Outcome: Adequate for Care Transition  10/12/2021 1515 by Radha Posadas RN  Outcome: Ongoing, Progressing  Intervention: Identify and Manage Fall Risk  Description: Perform standard risk assessment with a validated tool or comprehensive approach appropriate to the patient on admission; reassess fall risk frequently, with change in status or transfer to another level of care.  Communicate fall injury risk to interprofessional healthcare team.  Determine need for increased observation, equipment and environmental modification, such as low bed and signage, as well as supportive, nonskid footwear.  Adjust safety measures to individual developmental age, stage and identified risk factors.  Reinforce the importance of safety and physical activity with patient and family.  Perform regular intentional rounding to assess need for position change, pain assessment, personal needs, including assistance with toileting.  Recent Flowsheet Documentation  Taken 10/12/2021 1402 by Radha Posadas RN  Safety Promotion/Fall Prevention:   safety round/check completed   room organization consistent  Taken 10/12/2021 0810 by Radha Posadas RN  Safety Promotion/Fall Prevention:   safety round/check completed   room organization consistent   nonskid shoes/slippers when out of bed  Intervention: Prevent Skin  Injury  Description: Assess skin risk on admission and at regular intervals throughout hospital stay.  Keep all areas of skin (especially folds) clean and dry.  Maintain adequate skin hydration.  Relieve and redistribute pressure and protect bony prominences; implement measures based on patient-specific risk factors.  Match turning and repositioning schedule to clinical condition.  Encourage weight shift frequently; assist with reposition if unable to complete independently.  Float heels off bed. Avoid pressure on the Achilles tendon.  Keep skin free from extended contact with medical devices.  Use aids (e.g., slide boards, mechanical lift) during transfer.  Recent Flowsheet Documentation  Taken 10/12/2021 1402 by Radha Posadas RN  Body Position:   position changed independently   dangle, side of bed  Taken 10/12/2021 0810 by Radha Posadas RN  Body Position:   position changed independently   dangle, side of bed  Skin Protection:   adhesive use limited   incontinence pads utilized   tubing/devices free from skin contact  Intervention: Prevent and Manage VTE (venous thromboembolism) Risk  Description: Assess for VTE risk.  Encourage/assist with early ambulation.  Initiate and maintain compression or other therapy, as indicated based on identified risk in accordance with organizational protocol/provider order.  Encourage both active and passive leg exercises while in bed, if unable to ambulate.  Recent Flowsheet Documentation  Taken 10/12/2021 1402 by Radha Posadas RN  VTE Prevention/Management:   bilateral   dorsiflexion/plantar flexion performed  Taken 10/12/2021 0810 by Radha Posadas RN  VTE Prevention/Management:   bilateral   dorsiflexion/plantar flexion performed  Intervention: Prevent Infection  Description: Maintain skin and mucous membrane integrity; promote hand, oral and pulmonary hygiene.  Optimize fluid balance, nutrition, sleep and glycemic control to maximize infection  resistance.  Identify potential sources of infection early to prevent or mitigate progression of infection (e.g., wound, lines, devices).  Evaluate ongoing need for invasive devices; remove promptly when no longer indicated.  Recent Flowsheet Documentation  Taken 10/12/2021 1600 by Radha Posadas RN  Infection Prevention:   hand hygiene promoted   personal protective equipment utilized   single patient room provided   visitors restricted/screened  Taken 10/12/2021 1400 by Radha Posadas RN  Infection Prevention:   hand hygiene promoted   personal protective equipment utilized   single patient room provided   visitors restricted/screened  Taken 10/12/2021 1200 by Radha Posadas RN  Infection Prevention:   hand hygiene promoted   personal protective equipment utilized   single patient room provided   visitors restricted/screened  Taken 10/12/2021 1000 by Radha Posadas RN  Infection Prevention:   hand hygiene promoted   personal protective equipment utilized   single patient room provided   visitors restricted/screened  Taken 10/12/2021 0810 by Radha Posadas RN  Infection Prevention:   hand hygiene promoted   personal protective equipment utilized   single patient room provided   visitors restricted/screened  Taken 10/12/2021 0800 by Radha Posadas RN  Infection Prevention:   hand hygiene promoted   personal protective equipment utilized   single patient room provided   visitors restricted/screened  Goal: Optimal Comfort and Wellbeing  10/12/2021 1819 by Radha Posadas RN  Outcome: Adequate for Care Transition  10/12/2021 1515 by Radha Posadas RN  Outcome: Ongoing, Progressing  Intervention: Provide Person-Centered Care  Description: Use a family-focused approach to care.  Develop trust and rapport by proactively providing information, encouraging questions, addressing concerns and offering reassurance.  Acknowledge emotional response to hospitalization.  Recognize and utilize  personal coping strategies.  Honor spiritual and cultural preferences.  Recent Flowsheet Documentation  Taken 10/12/2021 1402 by Radha Posadas RN  Trust Relationship/Rapport:   care explained   choices provided   emotional support provided   empathic listening provided   questions answered   questions encouraged   reassurance provided   thoughts/feelings acknowledged  Taken 10/12/2021 0810 by Radha Posadas RN  Trust Relationship/Rapport:   care explained   choices provided   emotional support provided   empathic listening provided   questions answered   questions encouraged   reassurance provided   thoughts/feelings acknowledged  Goal: Readiness for Transition of Care  10/12/2021 1819 by Radha Posadas RN  Outcome: Adequate for Care Transition  10/12/2021 1515 by Radha Posadas RN  Outcome: Ongoing, Progressing     Problem: COPD Comorbidity  Goal: Maintenance of COPD Symptom Control  10/12/2021 1819 by Radha Posadas RN  Outcome: Adequate for Care Transition  10/12/2021 1515 by Radha Posadas RN  Outcome: Ongoing, Progressing  Intervention: Maintain COPD-Symptom Control  Description: Evaluate adherence to management plan (e.g., medication, trigger avoidance, infection prevention, self-monitoring).  Advocate for continuation of home regimen, including medication, method of delivery, schedule and symptom monitoring.  Anticipate the need for breathing techniques and activity pacing to minimize fatigue and breathlessness.  Assess for proper use of inhaled medication and delivery technique; assist or reinstruct if needed.  Evaluate effectiveness of coping skills; encourage expression of feelings, expectations and concerns related to disease management and quality of life; reinforce education to enhance health management plan and wellbeing.  Recent Flowsheet Documentation  Taken 10/12/2021 1402 by Radha Posadas RN  Medication Review/Management: medications reviewed  Taken 10/12/2021  0810 by Radha Posadas RN  Medication Review/Management: medications reviewed     Problem: Fluid Imbalance (Pneumonia)  Goal: Fluid Balance  10/12/2021 1819 by Radha Posadas RN  Outcome: Adequate for Care Transition  10/12/2021 1515 by Radha Posadas RN  Outcome: Ongoing, Progressing     Problem: Infection (Pneumonia)  Goal: Resolution of Infection Signs and Symptoms  10/12/2021 1819 by Radha Posadas RN  Outcome: Adequate for Care Transition  10/12/2021 1515 by Radha Posadas RN  Outcome: Ongoing, Progressing     Problem: Respiratory Compromise (Pneumonia)  Goal: Effective Oxygenation and Ventilation  10/12/2021 1819 by Radha Posadas RN  Outcome: Adequate for Care Transition  10/12/2021 1515 by Radha Posadas RN  Outcome: Ongoing, Progressing  Intervention: Promote Airway Secretion Clearance  Description: Assess the effectiveness of pulmonary hygiene and ability to perform airway clearance techniques.  Promote early mobility/ambulation; match to ability and tolerance.  Encourage deep breathing and lung expansion therapy to prevent atelectasis (e.g., incentive spirometry, positive airway pressure).  Anticipate the need to splint chest or abdominal wall with cough to minimize discomfort; assist if needed.  Initiate cough-enhancement and airway-clearance techniques with instruction (e.g., active cycle breathing, positive expiratory pressure, suction); consider mechanical insufflation-exsufflation in the presence of neuromuscular weakness.  Consider pharmacologic therapy (e.g., systemic corticosteroid, beta-2 agonist, mucolytic, antimicrobial) to improve mucus clearance, inflammation, cough response and air flow.  Initiate inspiratory muscle training to decrease the risk of pulmonary complications.  Recent Flowsheet Documentation  Taken 10/12/2021 1402 by Radha Posadas RN  Cough And Deep Breathing: done independently per patient  Taken 10/12/2021 0810 by Radha Posadas  RN  Cough And Deep Breathing: done independently per patient  Intervention: Optimize Oxygenation and Ventilation  Description: Establish oxygenation and ventilation parameters and goals; consider home baseline values for chronic cardiac and lung conditions.  Anticipate noninvasive and invasive monitoring (e.g., pulse oximetry, end-tidal carbon dioxide, blood gases, cardiovascular).  Maintain optimal position to relieve discomfort, breathlessness and ventilation/perfusion mismatch.  Provide oxygen therapy judiciously to avoid hyperoxemia; adjust to achieve oxygenation goal.  Monitor fluid balance closely to minimize the risk of fluid overload.  Implement noninvasive or invasive positive pressure to enhance alveolar ventilation.  Recent Flowsheet Documentation  Taken 10/12/2021 1402 by Radha Posadas RN  Head of Bed ([facility]): [facility] elevated  Taken 10/12/2021 0810 by Radha Posadas RN  Head of Bed ([facility]): [facility] elevated     Problem: Chest Pain  Goal: Resolution of Chest Pain Symptoms  10/12/2021 1819 by Radha Posadas RN  Outcome: Adequate for Care Transition  10/12/2021 1515 by Radha Posadas RN  Outcome: Ongoing, Progressing   Goal Outcome Evaluation:  Plan of Care Reviewed With: patient        Progress: no change

## 2021-10-12 NOTE — PROGRESS NOTES
Pharmacy Consult: Warfarin Dosing/ Monitoring  Bobby L Tapley is a 81 y.o. male, estimated creatinine clearance is 70 mL/min (A) (by C-G formula based on SCr of 0.69 mg/dL (L)). weighing 68.3 kg (150 lb 9.2 oz).     has a past medical history of Aneurysm of aortic arch (HCC), Aortic insufficiency, Aortic stenosis, Arthritis, Ascending aortic aneurysm (HCC), CAD (coronary artery disease), Enlarged prostate, H/O aortic valve replacement, H/O echocardiogram (02/10/2015), Health care maintenance, History of EKG (07/07/2015), Hyperlipidemia, Hypertension, Kidney stones, JOY on CPAP, Precordial pain, and Shortness of breath.    Social History     Tobacco Use    Smoking status: Never Smoker    Smokeless tobacco: Never Used   Vaping Use    Vaping Use: Never used   Substance Use Topics    Alcohol use: No     Comment: No caffeine use    Drug use: No       Results from last 7 days   Lab Units 10/12/21  0729 10/11/21  0717 10/10/21  0612 10/09/21  2036 10/09/21  1125   INR  3.55* 3.10* 3.06* 3.55* 3.55*   HEMOGLOBIN g/dL  --  14.0 13.4  --  12.4*   HEMATOCRIT %  --  41.7 42.8  --  38.2   PLATELETS 10*3/mm3  --  285 215  --  186     Results from last 7 days   Lab Units 10/11/21  0717 10/10/21  0612 10/09/21  1125   SODIUM mmol/L 139 139 137   POTASSIUM mmol/L 4.2 4.5 4.1   CHLORIDE mmol/L 103 103 102   CO2 mmol/L 24.0 24.9 24.4   BUN mg/dL 24* 22 27*   CREATININE mg/dL 0.69* 0.58* 0.61*   CALCIUM mg/dL 8.9 9.1 9.0   BILIRUBIN mg/dL 1.2 1.1 1.2   ALK PHOS U/L 47 45 45   ALT (SGPT) U/L 20 16 19   AST (SGOT) U/L 30 25 25   GLUCOSE mg/dL 125* 99 113*     Anticoagulation history: H/o aortic valve replacement anticoagulated on warfarin and managed by [facility] coagulation clinic. Most recent follow up on 9/23 where reported INR = 3.5. Patient taking warfarin 2.5mg on Tuesday and 5mg on all other days.    Hospital Anticoagulation:  Consulting provider: Dr. Robles  Start date: 10/9/21  Indication: Mechanical aortic valve  Target INR:  2.5-3.5  Expected duration: Indefinite   Bridge Therapy: No                Date 10/9 10/10 10/11 10/12         INR 3.55 3.06 3.1 3.55         Warfarin dose Holding this PM 5mg 5mg HOLD           Potential drug interactions:   Acetaminophen (PRN, no charted doses thus far)- dose dependent reduction in warfarin metabolism resulting in increased risk of bleeding    Ascorbic acid - may decrease the serum concentration of warfarin     ASA (home med) - may result in increased risk of bleeding via increased platelet inhibition    Azithromycin (D#2)- may result in an increased risk of bleeding.     Ceftriaxone (D#3)- may result in an increased risk of bleeding.     Leflunoamide (home med)- may result in increased risk of bleeding.     Pantoprazole (Omeprazole at home)- may result in increased International Normalized Ratio (INR) and prothrombin time    Prednisone (home med)- may result in increased risk of bleeding    Relevant nutrition status:   Dietary Orders (From admission, onward)       Start     Ordered    10/09/21 1952  Diet Regular  Diet Effective Now        Question:  Diet Texture / Consistency  Answer:  Regular    10/09/21 1952                  Other: On 10/10, Dr. Guthrie recommended bronchoscopy but patient declined at that time.      Education complete?/ Date: Not at this time    Assessment/Plan:  - INR SUPRAtherapeutic today at 3.55. INR has fluctuated over past 3 days. Several potential DDIs and acute infection with concern for PNA.   - formerly [facility] resumed home Warfarin 2.5mg on Tuesday and 5mg on all other days on 10/10. 2.5 mg dose is due today.   - Plan to hold warfarin today. Discontinue the warfarin 2.5 mg dose due today. Will leave warfarin 5 mg dose for all other days active at this time and reassess tomorrow prior to dose being due.   - Pt/INR ordered for daily labs. Will reassess tomorrow.     Pharmacy will continue to follow until discharge or discontinuation of warfarin.     Michelle Lancaster,  [facility]  10/12/2021

## 2021-10-12 NOTE — PLAN OF CARE
Goal Outcome Evaluation:               Pt resting in bed quietly. Denies pain. Up ad sergio. On 2 3 L NC. Up ad sergio.

## 2021-10-12 NOTE — MBS/VFSS/FEES
Acute Care - Speech Language Pathology   Swallow Initial Evaluation [facility]     Patient Name: Bobby L Tapley  : 1940  MRN: [id number]  Today's Date: 10/12/2021               Admit Date: 10/9/2021    Visit Dx:     ICD-10-CM ICD-9-CM   1. Acute hypoxemic respiratory failure (HCC)  J96.01 518.81   2. Community acquired pneumonia of right lung, unspecified part of lung  J18.9 486     Patient Active Problem List   Diagnosis   • History of prosthetic aortic valve   • BP (high blood pressure)   • HLD (hyperlipidemia)   • Hx of CABG   • hx of mechanical AV conduit ([id number] 23 mm) by Dr. Santana Bailey at [facility] 2004   • JOY (obstructive sleep apnea)   • Hypersomnia with sleep apnea   • Weight loss   • Witnessed episode of apnea   • BPH (benign prostatic hyperplasia)   • Chronic headaches   • CTS (carpal tunnel syndrome)   • Dyslipidemia   • High frequency hearing loss   • Mechanical heart valve present   • Nephrolithiasis   • Osteoarthritis   • Sleep related hypoxia   • Situational depression   • Subcutaneous lipoma   • Leg pain, right   • Aortic arch aneurysm (HCC)   • Coronary artery disease   • Memory loss   • Acute hypoxemic respiratory failure (HCC)   • Pulmonary infiltrate   • Tachycardia     Past Medical History:   Diagnosis Date   • Aneurysm of aortic arch (HCC)    • Aortic insufficiency    • Aortic stenosis    • Arthritis    • Ascending aortic aneurysm (HCC)    • CAD (coronary artery disease)    • Enlarged prostate    • H/O aortic valve replacement    • H/O echocardiogram 02/10/2015   • Health care maintenance    • History of EKG 2015   • Hyperlipidemia    • Hypertension    • Kidney stones    • JOY on CPAP    • Precordial pain    • Shortness of breath      Past Surgical History:   Procedure Laterality Date   • AORTIC VALVE REPAIR/REPLACEMENT     • CARDIAC CATHETERIZATION  2013   • CARDIAC CATHETERIZATION N/A 2021    Procedure: Coronary angiography;  Surgeon: Piedad  MD Radha;  Location: [facility] CATH INVASIVE LOCATION;  Service: Cardiovascular;  Laterality: N/A;   • COLONOSCOPY N/A 8/15/2017    Procedure: COLONOSCOPY;  Surgeon: Hu Bergman MD;  Location: [facility] ENDOSCOPY;  Service:    • CORONARY ARTERY BYPASS GRAFT     • ROTATOR CUFF REPAIR         SLP Recommendation and Plan  SLP Swallowing Diagnosis: mild-moderate, oral dysphagia, pharyngeal dysphagia (10/12/21 1330)  SLP Diet Recommendation: soft textures, thin liquids (10/12/21 1330)  Recommended Precautions and Strategies: upright posture during/after eating, small bites of food and sips of liquid (10/12/21 1330)  SLP Rec. for Method of Medication Administration: meds whole, with pudding or applesauce, as tolerated (10/12/21 1330)     Monitor for Signs of Aspiration: yes (10/12/21 1330)  Recommended Diagnostics: reassess via FEES, reassess via VFSS (MBS) (10/12/21 1330)  Swallow Criteria for Skilled Therapeutic Interventions Met: demonstrates skilled criteria (10/12/21 1330)  Anticipated Discharge Disposition (SLP): unknown (10/12/21 1330)  Rehab Potential/Prognosis, Swallowing: good, to achieve stated therapy goals (10/12/21 1330)  Therapy Frequency (Swallow): PRN (10/12/21 1330)  Predicted Duration Therapy Intervention (Days): until discharge (10/12/21 1330)                         Outcome Summary: VFSS completed. Recommend soft whole textures and thin liquids. Recommend meds whole with puree. Recommend upright, slow rate, double swallow, alternate liquids/solids. SLP to follow for therapy, diet tolerance, and education.      SWALLOW EVALUATION (last 72 hours)     SLP Adult Swallow Evaluation     Row Name 10/12/21 1330 10/11/21 1300       Rehab Evaluation    Document Type re-evaluation  -OC evaluation  -AW    Subjective Information no complaints  -OC no complaints  -AW    Patient Observations alert; cooperative; agree to therapy  -OC alert; cooperative; agree to therapy  -AW    Patient/Family/Caregiver  Comments/Observations -- Pt's wife present.  -AW    Care Plan Review evaluation/treatment results reviewed  -OC evaluation/treatment results reviewed; care plan/treatment goals reviewed; risks/benefits reviewed; current/potential barriers reviewed; patient/other agree to care plan  -AW    Care Plan Review, Other Participant(s) -- spouse  -AW    Patient Effort good  -OC good  -AW    Symptoms Noted During/After Treatment none  -OC none  -AW             General Information    Patient Profile Reviewed yes  -OC yes  -AW    Pertinent History Of Current Problem RUL PNA worsening X2 days  -OC Pt admitted with respiratory failure and PNA.  -AW    Current Method of Nutrition regular textures; thin liquids  -OC regular textures; thin liquids  -AW    Precautions/Limitations, Vision WFL; for purposes of eval  -OC WFL; for purposes of eval  -AW    Precautions/Limitations, Hearing WFL; for purposes of eval  -OC WFL; for purposes of eval  -AW    Prior Level of Function-Communication WFL  -OC WFL  -AW    Prior Level of Function-Swallowing no diet consistency restrictions  -OC no diet consistency restrictions  -AW    Plans/Goals Discussed with -- patient; spouse/S.O.; agreed upon  -AW    Barriers to Rehab none identified  -OC none identified  -AW    Patient's Goals for Discharge patient did not state  -OC return home  -AW    Family Goals for Discharge -- family did not state  -AW             Pain    Additional Documentation -- Pain Scale: Numbers Pre/Post-Treatment (Group)  -AW             Pain Scale: Numbers Pre/Post-Treatment    Pretreatment Pain Rating 0/10 - no pain  -OC 0/10 - no pain  -AW    Posttreatment Pain Rating 0/10 - no pain  -OC 0/10 - no pain  -AW             Oral Motor Structure and Function    Dentition Assessment natural, present and adequate; missing teeth  -OC natural, present and adequate; missing teeth  -AW    Secretion Management WNL/WFL  -OC WNL/WFL  -AW    Mucosal Quality moist, healthy  -OC moist, healthy   -AW    Volitional Swallow WFL  -OC WFL  -AW    Volitional Cough WFL  -OC WFL  -AW             Oral Musculature and Cranial Nerve Assessment    Oral Motor General Assessment -- WFL  -AW             General Eating/Swallowing Observations    Respiratory Support Currently in Use -- nasal cannula  -AW    Eating/Swallowing Skills -- fed by SLP; self-fed  -AW    Positioning During Eating -- upright 90 degree  -AW    Utensils Used -- spoon; cup; straw  -AW    Consistencies Trialed -- regular textures; soft textures; mixed consistency; pureed; thin liquids  -AW    Pre SpO2 (%) -- 95  -AW    Post SpO2 (%) -- 95  -AW             Clinical Swallow Eval    Clinical Swallow Evaluation Summary -- Clinical swallow eval completed. Pt c/o decreased taste, and his wife reported poor intake at home. Pt tolerated trials of thins (cup/straw), pureed, soft/mixed/reguar solids with no overt s/s of aspiration. Mastication was slow due to missing teeth. Laryngeal elevation was adequate with palpation. Recommend pt continue with a regular diet with thin; meds as tolerated; upright for meals and 30 min after; slow rate. Given concern for aspiration and location of PNA, will schedule VFSS to r/o silent aspiration.  -AW             MBS/VFSS Interpretation    VFSS Summary VFSS completed. Patient presents with mild-moderate oropharyngeal dysphagia characterized by mistiming, decreased coordination, and reduced laryngeal vestibule closure. Noted small bar like prominence/pouch upper esophagus, does not negatively impact swallow function at this time. Patient demonstrated inconsistent mistiming with thin liquids, no penetration/aspiration. Patient with no penetration or aspiration nectar thick liquids, puree, and mechanical soft. Patient demonstrated prolonged mastication, mild-moderate oropharyngeal residue with regular textures. Patient demonstrated silent  aspiration thin liquids during the swallow as liquid wash to dry solid. No penetration or  aspiration nectar thick liquid wash with additional trial of dry solid.  -OC --             SLP Communication to Radiology    Summary Statement VFSS completed. Patient presents with mild-moderate oropharyngeal dysphagia characterized by mistiming, decreased coordination, and reduced laryngeal vestibule closure. Noted small bar like prominence/pouch upper esophagus, does not negatively impact swallow function at this time. Patient demonstrated inconsistent mistiming with thin liquids, no penetration/aspiration. Patient with no penetration or aspiration nectar thick liquids, puree, and mechanical soft. Patient demonstrated prolonged mastication, mild-moderate oropharyngeal residue with regular textures. Patient demonstrated aspiration thin liquids during the swallow as liquid wash to dry solid. No penetration or aspiration nectar thick liquid wash with additional trial of dry solid.  -OC --             Clinical Impression    SLP Swallowing Diagnosis mild-moderate; oral dysphagia; pharyngeal dysphagia  -OC R/O pharyngeal dysphagia  -AW    Functional Impact risk of aspiration/pneumonia  -OC risk of aspiration/pneumonia  -AW    Rehab Potential/Prognosis, Swallowing good, to achieve stated therapy goals  -OC good, to achieve stated therapy goals  -AW    Swallow Criteria for Skilled Therapeutic Interventions Met demonstrates skilled criteria  -OC demonstrates skilled criteria  -AW             Recommendations    Therapy Frequency (Swallow) PRN  -OC PRN  -AW    Predicted Duration Therapy Intervention (Days) until discharge  -OC until discharge  -AW    SLP Diet Recommendation soft textures; thin liquids  -OC regular textures; thin liquids  -AW    Recommended Diagnostics reassess via FEES; reassess via VFSS (MBS)  -OC VFSS (MBS)  -AW    Recommended Precautions and Strategies upright posture during/after eating; small bites of food and sips of liquid  -OC upright posture during/after eating; small bites of food and sips of  liquid  -AW    Oral Care Recommendations Oral Care BID/PRN  -OC Oral Care BID/PRN  -AW    SLP Rec. for Method of Medication Administration meds whole; with pudding or applesauce; as tolerated  -OC meds whole; as tolerated  -AW    Monitor for Signs of Aspiration yes  -OC yes  -AW    Anticipated Discharge Disposition (SLP) unknown  -OC unknown  -AW             Swallow Goals (SLP)    Oral Nutrition/Hydration Goal Selection (SLP) -- oral nutrition/hydration, SLP goal 1  -AW             Oral Nutrition/Hydration Goal 1 (SLP)    Oral Nutrition/Hydration Goal 1, SLP Pt will safely tolerate the least restrictive diet with no s/s of aspiration.  -OC Pt will safely tolerate the least restrictive diet with no s/s of aspiration.  -AW    Time Frame (Oral Nutrition/Hydration Goal 1, SLP) by discharge  -OC by discharge  -AW          User Key  (r) = Recorded By, (t) = Taken By, (c) = Cosigned By    Initials Name Effective Dates    Rebecca Hannon MA,CCC-SLP 06/16/21 -     Wendi Dave, MS CCC-SLP 06/16/21 -                 EDUCATION  The patient has been educated in the following areas:   Dysphagia (Swallowing Impairment).        SLP GOALS     Row Name 10/12/21 1330 10/11/21 1300          Oral Nutrition/Hydration Goal 1 (SLP)    Oral Nutrition/Hydration Goal 1, SLP Pt will safely tolerate the least restrictive diet with no s/s of aspiration.  -OC Pt will safely tolerate the least restrictive diet with no s/s of aspiration.  -AW     Time Frame (Oral Nutrition/Hydration Goal 1, SLP) by discharge  -OC by discharge  -AW           User Key  (r) = Recorded By, (t) = Taken By, (c) = Cosigned By    Initials Name Provider Type    Rebecca Hannon MA,[facility]-SLP Speech and Language Pathologist    Wendi Dave, MS CCC-SLP Speech and Language Pathologist              SLP Outcome Measures (last 72 hours)     SLP Outcome Measures     Row Name 10/12/21 1400 10/11/21 1300          SLP Outcome Measures    Outcome Measure Used? Adult NOMS  -OC Adult  NOMS  -AW            Adult FCM Scores    FCM Chosen Swallowing  -OC Swallowing  -AW     Swallowing FCM Score 4  -OC 7  -AW           User Key  (r) = Recorded By, (t) = Taken By, (c) = Cosigned By    Initials Name Effective Dates    Rebecca Hannon MA,CCC-SLP 06/16/21 -     AW Wendi Myers, MS RIGOBERTO-SLP 06/16/21 -                  Time Calculation:    Time Calculation- SLP     Row Name 10/12/21 1439             Time Calculation- SLP    SLP Start Time 1230  -OC      SLP Received On 10/12/21  -OC              Untimed Charges    SLP Eval/Re-eval  [facility] [employer] Fluoro Eval Swallow - 06080  -OC      79995-PM [employer] Fluoro Eval Swallow Minutes 90  -OC              Total Minutes    Untimed Charges Total Minutes 90  -OC       Total Minutes 90  -OC            User Key  (r) = Recorded By, (t) = Taken By, (c) = Cosigned By    Initials Name Provider Type    Rebecca Hannon MA,CCC-SLP Speech and Language Pathologist                Therapy Charges for Today     Code Description Service Date Service Provider Modifiers Qty    [id number]  ST [employer] FLUORO EVAL SWALLOW 6 10/12/2021 Rebecca Sexton MA,CCC-SLP GN 1               Rebecca Sexton MA,RIGOBERTO-SLP  10/12/2021

## 2021-10-12 NOTE — NURSING NOTE
Patient is 84-85% on room air while ambulating. When patient placed on 2L Nc patient went to 92%.

## 2021-10-12 NOTE — PLAN OF CARE
Goal Outcome Evaluation:              Outcome Summary: VFSS completed. Recommend soft whole textures and thin liquids. Recommend meds whole with puree. Recommend upright, slow rate, double swallow, alternate liquids/solids. SLP to follow for therapy, diet tolerance, and education.    Patient was not wearing a face mask during this therapy encounter. Therapist used appropriate personal protective equipment including mask, eye protection and gloves.  Mask used was standard procedure mask. Appropriate PPE was worn during the entire therapy session. Hand hygiene was completed before and after therapy session. Patient is not in enhanced droplet precautions.

## 2021-10-12 NOTE — DISCHARGE SUMMARY
PHYSICIAN DISCHARGE SUMMARY                                                                        [facility]    Patient Identification:  Name: Bobby L Tapley  Age: 81 y.o.  Sex: male  :  1940  MRN: [id number]  Primary Care Physician: Serenity Adam MD    Admit date: 10/9/2021  Discharge date and time: 10/12/2021     Discharged Condition: good    Discharge Diagnoses:  Acute hypoxic respiratory failure:Pulmonary infiltrate:  Prosthetic aortic valve -mechanical: Continue anticoagulation.    JOY (obstructive sleep apnea)    Coronary artery disease    Sinus Tachycardia:  .    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  O2 sat was 87%  Chest x-ray followed by CTA of the lung showed no evidence of PE but did show a right upper lobe as well as a mild right lower lobe infiltrative process.  There is also mild to moderate right pleural effusion.  Clinically this infiltrate would have been very typical for bacterial pneumonia.  There is no fever associated with it, no leukocytosis, no left shift, normal procalcitonin level, no sputum production.  He was starting empiric antibiotics however and pulmonary consultation was obtained.  Bronchoscopy was recommended but the patient has declined.  This would be a more difficult procedure on the gentleman on high-dose Coumadin with prosthetic valves and emphysema.  Respiratory panel cultures are negative.  He is continued on empiric antibiotics in form Rocephin and azithromycin.  I did request a speech therapy evaluation noting possibility of pneumonitis secondary to aspiration.  There does appear to be some mild dysphagia and his diet has been modified.  Clinically he is feeling a little better.  With inability perform bronchoscopy  pulmonology is recommended to continue empiric antibiotics which can be completed as an outpatient and they will follow him up in their office with a repeat CT scan.  He remained afebrile throughout the hospitalization.  Patient has a number of cardiac issues and was tachycardic on presentation.  I did switch his ACE inhibitor over to beta-blockers with improvement of the tachycardia.  Requested evaluation by his cardiologist also.  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  His cardiothoracic surgeon was consulted on the case.  He would like to see the pulmonary issues addressed prior to pursuing this further.  He will be followed up by cardiovascular surgery then in the office after discharge also.  Presently remains afebrile vital signs stable and physically feeling about his baseline.  He is very eager to return home this evening possible and these arrangements are being made.    Addendum:  Although the patient is stating that he has oxygen at home, on further questioning it is not his.  He has been borrowing his wife's oxygen.  We will need to make these arrangements.      Consults:     Consults     Date and Time Order Name Status Description    10/11/2021  4:54 PM Inpatient Cardiothoracic Surgery Consult Completed     10/9/2021  7:52 PM Inpatient Cardiology Consult Completed     10/9/2021  7:52 PM Inpatient Pulmonology Consult Completed     10/9/2021  1:46 PM [facility] (on-call MD unless specified) Details Completed             Discharge Exam:  Afebrile vital signs stable.  Well-developed moderately thin male in no apparent distress.  Lungs with a few fine dry sounding rales on the right and a rare dry sounding rale on the left.  Overall good air movement.  Heart remains tacky running about one ten which is an improvement.  Prosthetic click and murmur still present.  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  Extremities with no clubbing cyanosis or edema.  Alert oriented conversant cooperative and pleasant.     Disposition:  Home    Patient Instructions:      Discharge Medications      New Medications      Instructions Start Date   cefdinir 300 MG capsule  Commonly known as: OMNICEF   300 mg, Oral, 2 Times Daily      metoprolol tartrate 25 MG tablet  Commonly known as: LOPRESSOR   12.5 mg, Oral, Every 12 Hours Scheduled         Continue These Medications      Instructions Start Date   alendronate 70 MG tablet  Commonly known as: FOSAMAX   70 mg, Oral, Every 7 Days, Tuesdays      aspirin 81 MG tablet   81 mg, Oral, Daily      Caltrate 600+D Plus Minerals 600-800 MG-UNIT tablet   1 tablet, Oral, Daily      Centrum Silver tablet tablet  Generic drug: multivitamin with minerals   1 tablet, Oral, Daily      finasteride 5 MG tablet  Commonly known as: PROSCAR   5 mg, Oral, Daily      HYDROCODONE-ACETAMINOPHEN PO   Oral, As Needed, 5.5/500       L-Lysine 500 MG capsule   1 capsule, Oral, As Needed      leflunomide 10 MG tablet  Commonly known as: ARAVA   10 mg, Oral, Daily      omeprazole 20 MG capsule  Commonly known as: priLOSEC   20 mg, Oral, Daily      predniSONE 5 MG tablet  Commonly known as: DELTASONE   5 mg, Oral, Daily      senna 8.6 MG tablet  Commonly known as: SENOKOT   2 tablets, Oral      TURMERIC-GINGER PO   2,600 mg, Oral, 2 Times Daily      vitamin C 500 MG tablet  Commonly known as: ASCORBIC ACID   1,000 mg, Oral, Daily      warfarin 5 MG tablet  Commonly known as: COUMADIN   5 mg, Oral, Daily      Zinc 50 MG capsule   Oral         Stop These Medications    enalapril 2.5 MG tablet  Commonly known as: VASOTEC          Diet Instructions     Diet: Soft Texture; Thin Liquids, No Restrictions; Whole      Discharge Diet: Soft Texture    Fluid Consistency: Thin Liquids, No Restrictions    Soft Options: Whole        Future Appointments   Date Time Provider  Department Center   10/19/2021 11:00 AM Mendez Cox MD [facility]     Additional Instructions for the Follow-ups that You Need to Schedule     Discharge Follow-up with PCP   As directed       Currently Documented PCP:    Serenity Adam MD    PCP Phone Number:    [phone]     Follow Up Details: 1 week         Discharge Follow-up with Specialty: Pulmonology.  Cardiothoracic surgery.  Cardiology.   As directed      Specialty: Pulmonology.  Cardiothoracic surgery.  Cardiology.    Follow Up Details: As directed.         Protime-INR    Oct 15, 2021 (Approximate)      Release to patient: Immediate            Follow-up Information     Serenity Adam MD. Schedule an appointment as soon as possible for a visit in 1 week(s).    Specialty: Family Medicine  Why: 1 week  Contact information:  [address] [place] [id number]  [phone]             Mendez Cox MD. Schedule an appointment as soon as possible for a visit.    Specialty: Cardiothoracic Surgery  Contact information:  [address]  [place] [address]  [phone]             Donya Gagnon MD. Schedule an appointment as soon as possible for a visit.    Specialty: Cardiology  Contact information:  [address]  [place] [address]  [phone]                       Discharge Order (From admission, onward)    None            Total time spent discharging patient including evaluation,post hospitalization follow up,  medication and post hospitalization instructions and education total time exceeds 30 minutes.    Signed:  London Robles MD  10/12/2021  19:01 EDT    EMR Dragon/Transcription disclaimer:   Much of this encounter note is an electronic transcription/translation of spoken language to printed text. The electronic translation of spoken language may permit erroneous, or at times, nonsensical words or phrases to be inadvertently transcribed; Although I have reviewed the note for such errors, some  may still exist.

## 2021-10-12 NOTE — PLAN OF CARE
Goal Outcome Evaluation:  Plan of Care Reviewed With: patient        Progress: no change   Patient A&O x4, VSS, on 2-3L NC. Patient had video swallow done today and found to be aspirating thin liquids after dry foods. Diet changed to soft textures with thin liquids. Patient educated on changes and verbalized understanding. Awaiting Dr. Cox to see patient this afternoon and give recommendations after echo results yesterday. TM

## 2021-10-12 NOTE — CONSULTS
Patient Care Team:  Serenity Adam MD as PCP - General (Family Medicine)  Donya Gagnon MD as Consulting Physician (Cardiology)  Hoa Walker MD as Consulting Physician (Internal Medicine)  Mendez Cox MD as Surgeon (Cardiothoracic Surgery)  Wayne Garcia MD as Consulting Physician (Hematology and Oncology)    Chief complaint: Aortic Pseudoaneurysm    Subjective     History of Present Illness  Patient is a 81 y.o. male with a past medical history including hypertension, hyperlipidemia, rheumatoid arthritis, ascending aortic aneurysm, BPH, and JOY with home CPAP. In 2004 he underwent mechanical AV conduit, repair of the proximal aorta and CABGx1. He was seen by Dr. Cox earlier this year for a false aneurysm around the repair or his proximal aorta. Per the CTA in February the pseudoaneurysm measured 6.7x 4.6 cm which was increased from the previous study, and stable dilation of the aortic root measuring 4cm. At his last visit in April, Dr. Cox recommended redo ascending aorta and arch replacement. The patient has been on chronic steroids for arthritis, but was told by Dr. Cox to taper off prior to surgery. Surgery was tentatively planned for August, but was delayed related to the pandemic. He reports feeling well, until the end of last week. On 10/9/21 he presented to the ER with complaints of shortness of breath and right upper chest pain that began 3 days prior to presentation. He denies fever, cough, or chills. He reports that his wife wears oxygen at home, and he has been using her oxygen because he was noted to have a low oxygen saturation on their home monitor. CXR in the ER showed infiltrate in the right midlung suggestive of developing pneumonia and BNP was also elevated at 2,516. CT of the chest was completed which showed right upper lobe infiltrates, no changed in the aortic pseudoaneurysm, and bilateral pleural effusions. He was admitted for further work up. He was started  on empiric antibiotics. Pulmonary was consulted for evaluation and recommended bronchoscopy, which the patient declined. They felt to that there was a possibility of pneumonitis secondary to aspiration, and he is set to undergo VFSS today. An echocardiogram was performed yesterday which showed a highly aberrant jet of flow is noted to come from the right pulmonary artery and into the main pulmonary artery with unclear origin. It also appears to take a 90 degree angle heading toward the lateral wall of the right pulmonary artery. There is a concerning area of possible connection to the outpouching that is noted posterior to the aortic root. We were consulted for cardiac surgery evaluation.     Review of Systems   Constitutional: Positive for activity change and fatigue. Negative for chills.   HENT: Negative.    Eyes: Negative.    Respiratory: Positive for cough, chest tightness and shortness of breath. Negative for choking.    Gastrointestinal: Positive for abdominal pain. Negative for diarrhea, nausea and vomiting.   Endocrine: Negative.    Genitourinary: Negative for difficulty urinating, frequency and urgency.   Musculoskeletal: Negative.    Skin: Negative for rash and wound.   Allergic/Immunologic: Negative.    Neurological: Positive for weakness. Negative for dizziness, seizures and syncope.   Hematological: Negative.    Psychiatric/Behavioral: Negative.         Past Medical History:   Diagnosis Date    Aneurysm of aortic arch (HCC)     Aortic insufficiency     Aortic stenosis     Arthritis     Ascending aortic aneurysm (HCC)     CAD (coronary artery disease)     Enlarged prostate     H/O aortic valve replacement     H/O echocardiogram 02/10/2015    Health care maintenance     History of EKG 07/07/2015    Hyperlipidemia     Hypertension     Kidney stones     JOY on CPAP     Precordial pain     Shortness of breath      Past Surgical History:   Procedure Laterality Date    AORTIC VALVE REPAIR/REPLACEMENT       CARDIAC CATHETERIZATION  11/27/2013    CARDIAC CATHETERIZATION N/A 5/19/2021    Procedure: Coronary angiography;  Surgeon: Radha Herbert MD;  Location:  [facility];  Service: Cardiovascular;  Laterality: N/A;    COLONOSCOPY N/A 8/15/2017    Procedure: COLONOSCOPY;  Surgeon: Hu Bergman MD;  Location:  [facility]COPY;  Service:     CORONARY ARTERY BYPASS GRAFT      ROTATOR CUFF REPAIR       Family History   Problem Relation Age of Onset    Cancer Mother     Aneurysm Father     Heart disease Father     Aneurysm Brother     Heart disease Brother     Hypertension Cousin     Obesity Cousin     Lung cancer Cousin     Malig Hyperthermia Neg Hx      Social History     Tobacco Use    Smoking status: Never Smoker    Smokeless tobacco: Never Used   Vaping Use    Vaping Use: Never used   Substance Use Topics    Alcohol use: No     Comment: No caffeine use    Drug use: No     Medications Prior to Admission   Medication Sig Dispense Refill Last Dose    alendronate (FOSAMAX) 70 MG tablet Take 70 mg by mouth Every 7 (Seven) Days. Tuesdays   Past Week at Unknown time    aspirin 81 MG tablet Take 81 mg by mouth Daily.   10/8/2021 at Unknown time    Calcium Carbonate-Vit D-Min (CALTRATE 600+D PLUS MINERALS) 600-800 MG-UNIT tablet Take 1 tablet by mouth Daily.   10/8/2021 at Unknown time    enalapril (VASOTEC) 2.5 MG tablet Take 2.5 mg by mouth Daily.   10/8/2021 at Unknown time    finasteride (PROSCAR) 5 MG tablet Take 5 mg by mouth Daily.   10/8/2021 at Unknown time    HYDROCODONE-ACETAMINOPHEN PO Take  by mouth As Needed. 5.5/500       L-Lysine 500 MG capsule Take 1 capsule by mouth As Needed.   Past Week at Unknown time    leflunomide (ARAVA) 10 MG tablet Take 10 mg by mouth Daily.   10/8/2021 at Unknown time    Multiple Vitamins-Minerals (CENTRUM SILVER) tablet Take 1 tablet by mouth Daily.   10/8/2021 at Unknown time    omeprazole (priLOSEC) 20 MG capsule Take 20 mg by mouth Daily.   10/8/2021 at Unknown  "time    senna (SENOKOT) 8.6 MG tablet Take 2 tablets by mouth.   10/8/2021 at Unknown time    TURMERIC-ROYAL PO Take 2,600 mg by mouth 2 (Two) Times a Day.       warfarin (COUMADIN) 5 MG tablet Take 5 mg by mouth Daily.       Zinc 50 MG capsule Take  by mouth.   10/8/2021 at Unknown time    predniSONE (DELTASONE) 5 MG tablet Take 5 mg by mouth Daily.       vitamin C (ASCORBIC ACID) 500 MG tablet Take 1,000 mg by mouth Daily.        ascorbic acid, 1,000 mg, Oral, Daily  aspirin, 81 mg, Oral, Daily  azithromycin, 250 mg, Oral, Q24H  cefTRIAXone, 1 g, Intravenous, Q24H  finasteride, 5 mg, Oral, Daily  leflunomide, 10 mg, Oral, Daily  metoprolol tartrate, 12.5 mg, Oral, Q12H  multivitamin with minerals, 1 tablet, Oral, Daily  pantoprazole, 40 mg, Oral, QAM  predniSONE, 5 mg, Oral, Daily  senna, 2 tablet, Oral, Nightly  sodium chloride, 10 mL, Intravenous, Q12H  warfarin, 2.5 mg, Oral, Once per day on Tue  warfarin, 5 mg, Oral, Once per day on Sun Mon Wed Thu Fri Sat  zinc sulfate, 220 mg, Oral, Daily      Allergies:  Patient has no known allergies.    Objective      Vital Signs  Temp:  [97.5 °F (36.4 °C)-97.8 °F (36.6 °C)] 97.8 °F (36.6 °C)  Heart Rate:  [] 120  Resp:  [16-24] 16  BP: (100-122)/(59-68) 101/68    Flowsheet Rows        First Filed Value   Admission Height 180.3 cm (70.98\") Documented at 10/10/2021 0454   Admission Weight 68.3 kg (150 lb 9.2 oz) Documented at 10/10/2021 0454          180.3 cm (70.98\")    Physical Exam  Vitals and nursing note reviewed.   Constitutional:       Appearance: Normal appearance.   HENT:      Head: Normocephalic and atraumatic.      Nose: Nose normal.      Mouth/Throat:      Mouth: Mucous membranes are moist.   Eyes:      Pupils: Pupils are equal, round, and reactive to light.   Cardiovascular:      Rate and Rhythm: Normal rate and regular rhythm.      Pulses: Normal pulses.      Heart sounds: Murmur heard.        Comments: Crisp valve click  Pulmonary:      Effort: " Pulmonary effort is normal.      Breath sounds: Rhonchi and rales present.   Abdominal:      General: Bowel sounds are normal.      Palpations: Abdomen is soft.   Musculoskeletal:         General: Normal range of motion.      Cervical back: Normal range of motion and neck supple.      Right lower leg: No edema.      Left lower leg: No edema.   Skin:     General: Skin is warm and dry.      Capillary Refill: Capillary refill takes less than 2 seconds.   Neurological:      General: No focal deficit present.      Mental Status: He is alert and oriented to person, place, and time. Mental status is at baseline.   Psychiatric:         Mood and Affect: Mood normal.         Behavior: Behavior normal.         Thought Content: Thought content normal.         Judgment: Judgment normal.         Results Review:   Lab Results (last 24 hours)       Procedure Component Value Units Date/Time    Respiratory Culture - Sputum, Cough [[id number]] Collected: 10/10/21 0836    Specimen: Sputum from Cough Updated: 10/12/21 0922     Respiratory Culture Scant growth (1+) Normal Respiratory Sasha: NO S.aureus/MRSA or Pseudomonas aeruginosa     Gram Stain Few (2+) Epithelial cells per low power field      Rare (1+) Mixed bacterial morphotypes seen on Gram Stain      Rare (1+) WBCs per low power field    Protime-INR [[id number]]  (Abnormal) Collected: 10/12/21 0729    Specimen: Blood Updated: 10/12/21 0848     Protime 35.2 Seconds      INR 3.55    Blood Culture - Blood, Arm, Right [[id number]]  (Normal) Collected: 10/09/21 1516    Specimen: Blood from Arm, Right Updated: 10/11/21 1531     Blood Culture No growth at 2 days    Blood Culture - Blood, Arm, Left [[id number]]  (Normal) Collected: 10/09/21 1516    Specimen: Blood from Arm, Left Updated: 10/11/21 1531     Blood Culture No growth at 2 days          Assessment & Plan   - admit with acute hypoxemic respiratory failure  - RUL pulmonary infiltrate-- pneumonia vs. Pneumonitis; on azithromycin  -  aortic pseudoaneurysm--measuring 6.7x 4.6 cm per CTA  - ascending aortic aneurysm s/p mechanical AV conduit with repair of proximal aorta in 2004  - CAD s/p CABG 2004  - hypertension  - hyperlipidemia  - JOY with home CPAP  - rheumatoid arthritis  - BPH    Dr. Cox would like to allow time for his acute pulmonary issues to improve. Plan to follow up in the office in 4 weeks for re-discussion of surgery.    Thank you for allowing us to participate in the care of this patient.      Mackenzie Harrison, ABBY  10/12/21  10:56 EDT  Addendum  Patient was seen and examined by me, agree with findings above.  I reviewed and interpreted myself the echocardiogram/BRODERICK and chest CTA.  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  He is a status post aortic dissection repair.  He presented now with right pneumonia and dyspnea.  He is requiring nasal cannula oxygen is being treated for his pneumonia.  The false aneurysm is unchanged.  I recommend medical management of his pneumonia and reevaluation in 4 weeks for possible proximal aortic reoperation.  Mendez Cox MD

## 2021-10-12 NOTE — PROGRESS NOTES
Hospital Follow Up    LOS:  LOS: 3 days   Patient Name: Bobby L Tapley  Age/Sex: 81 y.o. male  : 1940  MRN: [id number]    Day of Service: 10/12/21   Length of Stay: 3  Encounter Provider: Phil Esquivel MD  Place of Service: [facility] CARDIOLOGY  Patient Care Team:  Serenity Adam MD as PCP - General (Family Medicine)  Donya Gagnon MD as Consulting Physician (Cardiology)  Hoa Walker MD as Consulting Physician (Internal Medicine)  Mendez Cox MD as Surgeon (Cardiothoracic Surgery)  Wayne Garcia MD as Consulting Physician (Hematology and Oncology)    Subjective:     Chief Complaint: Tachycardia    Interval History: Patient said he still very short of breath today.  Says he is not much improved from yesterday.    Objective:     Objective:  Temp:  [97.5 °F (36.4 °C)-97.8 °F (36.6 °C)] 97.8 °F (36.6 °C)  Heart Rate:  [] 120  Resp:  [16-24] 16  BP: (100-122)/(59-68) 101/68     Intake/Output Summary (Last 24 hours) at 10/12/2021 1018  Last data filed at 10/11/2021 2018  Gross per 24 hour   Intake 480 ml   Output --   Net 480 ml     Body mass index is 21.01 kg/m².      10/10/21  0454   Weight: 68.3 kg (150 lb 9.2 oz)     Weight change:       Physical Exam:   General : Alert, cooperative, in no acute distress.  Neuro: Alert,cooperative and oriented.  Lungs: CTAB. Normal respiratory effort and rate.  CV: Tachycardic normal S1 and S2, no murmurs, gallops or rubs.  ABD: Soft, nontender, nondistended. Positive bowel sounds.  Extr: No edema or cyanosis, moves all extremities.    Lab Review:   Results from last 7 days   Lab Units 10/11/21  0717 10/10/21  0612   SODIUM mmol/L 139 139   POTASSIUM mmol/L 4.2 4.5   CHLORIDE mmol/L 103 103   CO2 mmol/L 24.0 24.9   BUN mg/dL 24* 22   CREATININE mg/dL 0.69* 0.58*   GLUCOSE mg/dL 125* 99   CALCIUM mg/dL 8.9 9.1   AST (SGOT) U/L 30 25   ALT (SGPT) U/L 20 16     Results from last 7 days   Lab Units  10/10/21  0612 10/09/21  1125   TROPONIN T ng/mL 0.018 0.018     Results from last 7 days   Lab Units 10/11/21  0717 10/10/21  0612   WBC 10*3/mm3 11.28* 9.11   HEMOGLOBIN g/dL 14.0 13.4   HEMATOCRIT % 41.7 42.8   PLATELETS 10*3/mm3 285 215     Results from last 7 days   Lab Units 10/12/21  0729 10/11/21  0717   INR  3.55* 3.10*               Invalid input(s): LDLCALC  Results from last 7 days   Lab Units 10/09/21  1125   PROBNP pg/mL 2,516.0*     Results from last 7 days   Lab Units 10/10/21  0612   TSH uIU/mL 1.600       Current Medications:   Scheduled Meds:ascorbic acid, 1,000 mg, Oral, Daily  aspirin, 81 mg, Oral, Daily  azithromycin, 250 mg, Oral, Q24H  cefTRIAXone, 1 g, Intravenous, Q24H  finasteride, 5 mg, Oral, Daily  leflunomide, 10 mg, Oral, Daily  metoprolol tartrate, 12.5 mg, Oral, Q12H  multivitamin with minerals, 1 tablet, Oral, Daily  pantoprazole, 40 mg, Oral, QAM  predniSONE, 5 mg, Oral, Daily  senna, 2 tablet, Oral, Nightly  sodium chloride, 10 mL, Intravenous, Q12H  warfarin, 2.5 mg, Oral, Once per day on Tue  warfarin, 5 mg, Oral, Once per day on Sun Mon Wed Thu Fri Sat  zinc sulfate, 220 mg, Oral, Daily      Continuous Infusions:Pharmacy to dose warfarin,         Allergies:  No Known Allergies    Assessment:       Acute hypoxemic respiratory failure (HCC)    History of prosthetic aortic valve    JOY (obstructive sleep apnea)    Coronary artery disease    Pulmonary infiltrate    Tachycardia        Plan:     1. PNA  2. Sinus tachycardia  3. History of Bentall procedure (mechanical AVR/aortic root/ascending aorta replacement/reimplantation of coronaries) and aortic arch repair.  Dr. Santiago called me this morning concerned.  Could be a fistula with the main pulmonary artery.  Patient actually been followed by Dr. Restrepo for possible surgical repair of the aneurysm that was scheduled for August however due to COVID-19 cases on the rise that was deferred.  Going to reconsult him.  I spoke with his  nurse practitioner whom Dr. Santiago had already spoke to.  4. Stable CAD with recent cath showing no significant native disease (occluded SVG-LAD but the LAD had LI only)  5. Low body weight      Phil Esquivel MD  10/12/21  10:18 EDT

## 2021-10-12 NOTE — PROGRESS NOTES
"                                              LOS: 3 days   Patient Care Team:  Serenity Adam MD as PCP - General (Family Medicine)  Donya Gagnon MD as Consulting Physician (Cardiology)  Hoa Walker MD as Consulting Physician (Internal Medicine)  Mendez Cox MD as Surgeon (Cardiothoracic Surgery)  Wayne Garcia MD as Consulting Physician (Hematology and Oncology)    Chief Complaint:  F/up pneumonia, COPD and medical problems listed below    Subjective   Interval History  On 3 L oxygen. Reported no shortness of breath at rest. He said that had trouble breathing earlier today but improved by now. Has mild cough. No significant phlegm.    REVIEW OF SYSTEMS:     GASTROINTESTINAL: No anorexia, nausea, vomiting or diarrhea. No abdominal pain.  CONSTITUTIONAL: No fever or chills.     Ventilator/Non-Invasive Ventilation Settings (From admission, onward)            None                Physical Exam:     Vital Signs  Temp:  [97.5 °F (36.4 °C)-97.8 °F (36.6 °C)] 97.6 °F (36.4 °C)  Heart Rate:  [] 102  Resp:  [16-24] 16  BP: (100-122)/(54-68) 109/54    Intake/Output Summary (Last 24 hours) at 10/12/2021 1446  Last data filed at 10/11/2021 2018  Gross per 24 hour   Intake 120 ml   Output --   Net 120 ml     Flowsheet Rows      First Filed Value   Admission Height 180.3 cm (70.98\") Documented at 10/10/2021 0454   Admission Weight 68.3 kg (150 lb 9.2 oz) Documented at 10/10/2021 0454          PPE used per hospital policy    General Appearance:   Alert, cooperative, in no acute distress   ENMT:  Mallampati score 2, moist mucous membrane   Eyes:  Pupils equal and reactive to light. EOMI   Neck:   Trachea midline. No thyromegaly.   Lungs:    Bilateral crackles at the bases more prominent on the left and extending to third of the chest.    Heart:   Regular rhythm and normal rate, normal S1 and S2, no         murmur   Skin:   No rash   Abdomen:     Soft. No tenderness. No HSM.   Neuro:  Conscious, " alert, oriented x3. Strength 5/5 in upper and lower  ext   Extremities:  No cyanosis, clubbing or edema.  Warm extremities and well-perfused          Results Review:        Results from last 7 days   Lab Units 10/11/21  0717 10/10/21  0612 10/10/21  0612 10/09/21  1125 10/09/21  1125   SODIUM mmol/L 139  --  139  --  137   POTASSIUM mmol/L 4.2  --  4.5  --  4.1   CHLORIDE mmol/L 103  --  103  --  102   CO2 mmol/L 24.0  --  24.9  --  24.4   BUN mg/dL 24*  --  22  --  27*   CREATININE mg/dL 0.69*  --  0.58*  --  0.61*   GLUCOSE mg/dL 125*   < > 99   < > 113*   CALCIUM mg/dL 8.9  --  9.1  --  9.0    < > = values in this interval not displayed.     Results from last 7 days   Lab Units 10/10/21  0612 10/09/21  1125   TROPONIN T ng/mL 0.018 0.018     Results from last 7 days   Lab Units 10/11/21  0717 10/10/21  0612 10/09/21  1125   WBC 10*3/mm3 11.28* 9.11 7.84   HEMOGLOBIN g/dL 14.0 13.4 12.4*   HEMATOCRIT % 41.7 42.8 38.2   PLATELETS 10*3/mm3 285 215 186     Results from last 7 days   Lab Units 10/12/21  0729 10/11/21  0717 10/10/21  0612   INR  3.55* 3.10* 3.06*     Results from last 7 days   Lab Units 10/09/21  1125   PROBNP pg/mL 2,516.0*             Results from last 7 days   Lab Units 10/11/21  0717   CRP mg/dL 4.36*               I reviewed the patient's new clinical results.        Medication Review:   ascorbic acid, 1,000 mg, Oral, Daily  aspirin, 81 mg, Oral, Daily  azithromycin, 250 mg, Oral, Q24H  cefTRIAXone, 1 g, Intravenous, Q24H  finasteride, 5 mg, Oral, Daily  leflunomide, 10 mg, Oral, Daily  metoprolol tartrate, 12.5 mg, Oral, Q12H  multivitamin with minerals, 1 tablet, Oral, Daily  pantoprazole, 40 mg, Oral, QAM  predniSONE, 5 mg, Oral, Daily  senna, 2 tablet, Oral, Nightly  sodium chloride, 10 mL, Intravenous, Q12H  warfarin, 5 mg, Oral, Once per day on Sun Mon Wed Thu Fri Sat  zinc sulfate, 220 mg, Oral, Daily        Pharmacy to dose warfarin,         Diagnostic imaging:  I personally and  independently reviewed the following images:  CT chest 10/9/2021: RLL and RUL pulmonary infiltrates.  Upper lobe predominant emphysema.       Assessment     1. RUL/RLL infiltrates: Suggestive of pneumonia, including atypical infection due to the suppression.  Other possible etiologies include pneumonitis less likely malignancy (bronchoalveolar carcinoma could have similar presentation).  Could also represent ILD from RA or immunosuppressants.  2. COPD/emphysema, upper lobe predominant  3. Acute hypoxic respiratory failure, secondary to above  4. Rheumatoid arthritis, previously on steroid therapy but recently placed on leflunomide about 2 months ago  5. Mild to moderate oropharyngeal dysphagia on VFSS    Pertinent medical problems:  · Prosthetic aortic valve        Plan         · Empiric antibiotic with Rocephin and azithromycin. On discharge can use third-generation cephalosporin p.o.  · Follow-up imaging: Recommend CT chest in 4-6 weeks following completion of antibiotic therapy.  If persistent pulmonary infiltrates then he would require further evaluation including bronchoscopy.  Bronchoscopy has been offered twice and declined by patient.   · Bronchodilators with DuoNeb  · Dysphagia diet    Okay to discharge from pulmonary perspective. He would require oxygen    Cristo Larios MD  10/12/21  14:46 EDT          This note was dictated utilizing Light Harmonic dictation

## 2021-10-13 NOTE — CASE MANAGEMENT/SOCIAL WORK
Discharge Planning Assessment  [facility]     Patient Name: Bobby L Tapley  MRN: [id number]  Today's Date: 10/13/2021    Admit Date: 10/9/2021     Discharge Needs Assessment    No documentation.                Discharge Plan     Row Name 10/12/21 3796       Plan    Final Discharge Disposition Code 01 - home or self-care    Final Note D/C home via PV per daughter    Row Name 10/12/21 2203       Plan    Plan Orders noted for pt to go home with o2. Contacted [facility]. Spoke to Mahogany with [facility], information sent over via Epic. Portable tank delivered to pt. [facility] to deliver concentrator to pt home, then daughter will  pt once concentrator is delivered              Continued Care and Services - Discharged on 10/12/2021 Admission date: 10/9/2021 - Discharge disposition: Home or Self Care    Durable Medical Equipment     Service Provider Request Status Selected Services Address Phone Fax Patient Preferred    [facility]  Pending - Request Sent N/A [address] [id number] [phone] --              Expected Discharge Date and Time     Expected Discharge Date Expected Discharge Time    Oct 12, 2021          Demographic Summary    No documentation.                Functional Status    No documentation.                Psychosocial    No documentation.                Abuse/Neglect    No documentation.                Legal    No documentation.                Substance Abuse    No documentation.                Patient Forms    No documentation.                   Donna Jimenez, RN

## 2021-10-13 NOTE — CASE MANAGEMENT/SOCIAL WORK
Discharge Planning Assessment  [facility]     Patient Name: Bobby L Tapley  MRN: [id number]  Today's Date: 10/12/2021    Admit Date: 10/9/2021     Discharge Needs Assessment    No documentation.                Discharge Plan     Row Name 10/12/21 4791       Plan    Plan Orders noted for pt to go home with o2. Contacted [facility]. Spoke to Mahogany with [facility], information sent over via Epic. Portable tank delivered to pt. [facility] to deliver concentrator to pt home, then daughter will  pt once concentrator is delivered              Continued Care and Services - Admitted Since 10/9/2021     Durable Medical Equipment     Service Provider Request Status Selected Services Address Phone Fax Patient Preferred    [facility]  Pending - Request Sent N/A [id number] [id number] [phone] --              Expected Discharge Date and Time     Expected Discharge Date Expected Discharge Time    Oct 12, 2021          Demographic Summary    No documentation.                Functional Status    No documentation.                Psychosocial    No documentation.                Abuse/Neglect    No documentation.                Legal    No documentation.                Substance Abuse    No documentation.                Patient Forms    No documentation.                   Donna Jimenez, RN

## 2021-10-13 NOTE — DISCHARGE PLACEMENT REQUEST
"Tapley, Bobby L (81 y.o. Male)             Date of Birth Social Security Number Address Home Phone MRN    1940  [address] [phone] [id number]    Judaism Marital Status             Non-Yazdanism        Admission Date Admission Type Admitting Provider Attending Provider Department, Room/Bed    10/9/21 Emergency London Robles MD Broaddus, Emmett J., MD [address], E655/1    Discharge Date Discharge Disposition Discharge Destination           Home or Self Care              Attending Provider: London Robles MD    Allergies: No Known Allergies    Isolation: None   Infection: None   Code Status: CPR   Advance Care Planning Activity    Ht: 180.3 cm (70.98\")   Wt: 68.3 kg (150 lb 9.2 oz)    Admission Cmt: None   Principal Problem: Acute hypoxemic respiratory failure (HCC) [J96.01]                 Active Insurance as of 10/9/2021     Primary Coverage     Payor Plan Insurance Group Employer/Plan Group    MEDICARE MEDICARE A & B      Payor Plan Address Payor Plan Phone Number Payor Plan Fax Number Effective Dates    [address] [phone]  1/1/2005 - None Entered    [facility] [id number]       Subscriber Name Subscriber Birth Date Member ID       TAPLEY,BOBBY L 1940 [id number]           Secondary Coverage     Payor Plan Insurance Group Employer/Plan Group    [facility] SUPP [id number]     Payor Plan Address Payor Plan Phone Number Payor Plan Fax Number Effective Dates    [address]   12/1/2016 - None Entered    [facility] [address]       Subscriber Name Subscriber Birth Date Member ID       TAPLEY,BOBBY L 1940 [id number]                 Emergency Contacts      (Rel.) Home Phone Work Phone Mobile Phone    TapleyTonya (Spouse) [phone] -- [phone]    Tapley,Bedena (Daughter) -- -- [phone]              "

## 2021-10-14 ENCOUNTER — TELEPHONE (OUTPATIENT)
Dept: CARDIOLOGY | Facility: CLINIC | Age: 81
End: 2021-10-14

## 2021-10-14 LAB
BACTERIA SPEC AEROBE CULT: NORMAL
BACTERIA SPEC AEROBE CULT: NORMAL

## 2021-10-14 NOTE — TELEPHONE ENCOUNTER
This patient just got out of the hospital and the pt's wife wants him to only see Dr. Bartholomew. Needs to have a hosp follow up in 2 weeks. Where can I put the pt at on her schedule?    Thanks,  Cayla

## 2021-10-17 ENCOUNTER — APPOINTMENT (OUTPATIENT)
Dept: GENERAL RADIOLOGY | Facility: HOSPITAL | Age: 81
End: 2021-10-17

## 2021-10-17 ENCOUNTER — HOSPITAL ENCOUNTER (INPATIENT)
Facility: HOSPITAL | Age: 81
LOS: 10 days | Discharge: HOME | End: 2021-10-27
Attending: EMERGENCY MEDICINE | Admitting: INTERNAL MEDICINE

## 2021-10-17 DIAGNOSIS — R06.09 EXERTIONAL DYSPNEA: ICD-10-CM

## 2021-10-17 DIAGNOSIS — J18.9 PNEUMONIA OF BOTH LOWER LOBES DUE TO INFECTIOUS ORGANISM: Primary | ICD-10-CM

## 2021-10-17 DIAGNOSIS — M79.604 LEG PAIN, RIGHT: ICD-10-CM

## 2021-10-17 DIAGNOSIS — R79.89 ELEVATED LACTIC ACID LEVEL: ICD-10-CM

## 2021-10-17 PROBLEM — R13.10 DYSPHAGIA: Status: ACTIVE | Noted: 2021-10-17

## 2021-10-17 LAB
ALBUMIN SERPL-MCNC: 4.6 G/DL (ref 3.5–5.2)
ALBUMIN/GLOB SERPL: 2 G/DL
ALP SERPL-CCNC: 45 U/L (ref 39–117)
ALT SERPL W P-5'-P-CCNC: 56 U/L (ref 1–41)
ANION GAP SERPL CALCULATED.3IONS-SCNC: 18.6 MMOL/L (ref 5–15)
APTT PPP: 39.3 SECONDS (ref 22.7–35.4)
AST SERPL-CCNC: 37 U/L (ref 1–40)
BASOPHILS # BLD AUTO: 0.04 10*3/MM3 (ref 0–0.2)
BASOPHILS NFR BLD AUTO: 0.4 % (ref 0–1.5)
BILIRUB SERPL-MCNC: 2.3 MG/DL (ref 0–1.2)
BUN SERPL-MCNC: 56 MG/DL (ref 8–23)
BUN/CREAT SERPL: 61.5 (ref 7–25)
CALCIUM SPEC-SCNC: 9.7 MG/DL (ref 8.6–10.5)
CHLORIDE SERPL-SCNC: 105 MMOL/L (ref 98–107)
CO2 SERPL-SCNC: 19.4 MMOL/L (ref 22–29)
CREAT SERPL-MCNC: 0.91 MG/DL (ref 0.76–1.27)
D-LACTATE SERPL-SCNC: 3.3 MMOL/L (ref 0.5–2)
DEPRECATED RDW RBC AUTO: 43.4 FL (ref 37–54)
EOSINOPHIL # BLD AUTO: 0 10*3/MM3 (ref 0–0.4)
EOSINOPHIL NFR BLD AUTO: 0 % (ref 0.3–6.2)
ERYTHROCYTE [DISTWIDTH] IN BLOOD BY AUTOMATED COUNT: 14.3 % (ref 12.3–15.4)
GFR SERPL CREATININE-BSD FRML MDRD: 80 ML/MIN/1.73
GLOBULIN UR ELPH-MCNC: 2.3 GM/DL
GLUCOSE SERPL-MCNC: 129 MG/DL (ref 65–99)
HCT VFR BLD AUTO: 44.3 % (ref 37.5–51)
HGB BLD-MCNC: 14.4 G/DL (ref 13–17.7)
IMM GRANULOCYTES # BLD AUTO: 0.05 10*3/MM3 (ref 0–0.05)
IMM GRANULOCYTES NFR BLD AUTO: 0.5 % (ref 0–0.5)
INR PPP: 4.54 (ref 0.9–1.1)
LYMPHOCYTES # BLD AUTO: 0.69 10*3/MM3 (ref 0.7–3.1)
LYMPHOCYTES NFR BLD AUTO: 6.4 % (ref 19.6–45.3)
MCH RBC QN AUTO: 27.9 PG (ref 26.6–33)
MCHC RBC AUTO-ENTMCNC: 32.5 G/DL (ref 31.5–35.7)
MCV RBC AUTO: 85.9 FL (ref 79–97)
MONOCYTES # BLD AUTO: 0.69 10*3/MM3 (ref 0.1–0.9)
MONOCYTES NFR BLD AUTO: 6.4 % (ref 5–12)
NEUTROPHILS NFR BLD AUTO: 86.3 % (ref 42.7–76)
NEUTROPHILS NFR BLD AUTO: 9.33 10*3/MM3 (ref 1.7–7)
NRBC BLD AUTO-RTO: 0 /100 WBC (ref 0–0.2)
NT-PROBNP SERPL-MCNC: ABNORMAL PG/ML (ref 0–1800)
PLATELET # BLD AUTO: 370 10*3/MM3 (ref 140–450)
PMV BLD AUTO: 10 FL (ref 6–12)
POTASSIUM SERPL-SCNC: 5 MMOL/L (ref 3.5–5.2)
PROCALCITONIN SERPL-MCNC: 0.07 NG/ML (ref 0–0.25)
PROT SERPL-MCNC: 6.9 G/DL (ref 6–8.5)
PROTHROMBIN TIME: 42.8 SECONDS (ref 11.7–14.2)
RBC # BLD AUTO: 5.16 10*6/MM3 (ref 4.14–5.8)
SARS-COV-2 RNA RESP QL NAA+PROBE: NOT DETECTED
SODIUM SERPL-SCNC: 143 MMOL/L (ref 136–145)
TROPONIN T SERPL-MCNC: 0.03 NG/ML (ref 0–0.03)
WBC # BLD AUTO: 10.8 10*3/MM3 (ref 3.4–10.8)

## 2021-10-17 PROCEDURE — 71045 X-RAY EXAM CHEST 1 VIEW: CPT

## 2021-10-17 PROCEDURE — U0003 INFECTIOUS AGENT DETECTION BY NUCLEIC ACID (DNA OR RNA); SEVERE ACUTE RESPIRATORY SYNDROME CORONAVIRUS 2 (SARS-COV-2) (CORONAVIRUS DISEASE [COVID-19]), AMPLIFIED PROBE TECHNIQUE, MAKING USE OF HIGH THROUGHPUT TECHNOLOGIES AS DESCRIBED BY CMS-2020-01-R: HCPCS | Performed by: EMERGENCY MEDICINE

## 2021-10-17 PROCEDURE — 83605 ASSAY OF LACTIC ACID: CPT | Performed by: EMERGENCY MEDICINE

## 2021-10-17 PROCEDURE — U0005 INFEC AGEN DETEC AMPLI PROBE: HCPCS | Performed by: EMERGENCY MEDICINE

## 2021-10-17 PROCEDURE — 83880 ASSAY OF NATRIURETIC PEPTIDE: CPT | Performed by: EMERGENCY MEDICINE

## 2021-10-17 PROCEDURE — 85025 COMPLETE CBC W/AUTO DIFF WBC: CPT | Performed by: EMERGENCY MEDICINE

## 2021-10-17 PROCEDURE — 93010 ELECTROCARDIOGRAM REPORT: CPT | Performed by: INTERNAL MEDICINE

## 2021-10-17 PROCEDURE — 25010000002 VANCOMYCIN 10 G RECONSTITUTED SOLUTION: Performed by: EMERGENCY MEDICINE

## 2021-10-17 PROCEDURE — 84484 ASSAY OF TROPONIN QUANT: CPT | Performed by: EMERGENCY MEDICINE

## 2021-10-17 PROCEDURE — 87040 BLOOD CULTURE FOR BACTERIA: CPT | Performed by: EMERGENCY MEDICINE

## 2021-10-17 PROCEDURE — 84145 PROCALCITONIN (PCT): CPT | Performed by: EMERGENCY MEDICINE

## 2021-10-17 PROCEDURE — 99284 EMERGENCY DEPT VISIT MOD MDM: CPT

## 2021-10-17 PROCEDURE — 85730 THROMBOPLASTIN TIME PARTIAL: CPT | Performed by: EMERGENCY MEDICINE

## 2021-10-17 PROCEDURE — 25010000002 PIPERACILLIN SOD-TAZOBACTAM PER 1 G: Performed by: EMERGENCY MEDICINE

## 2021-10-17 PROCEDURE — 85610 PROTHROMBIN TIME: CPT | Performed by: EMERGENCY MEDICINE

## 2021-10-17 PROCEDURE — 93005 ELECTROCARDIOGRAM TRACING: CPT | Performed by: EMERGENCY MEDICINE

## 2021-10-17 PROCEDURE — 80053 COMPREHEN METABOLIC PANEL: CPT | Performed by: EMERGENCY MEDICINE

## 2021-10-17 RX ORDER — IPRATROPIUM BROMIDE AND ALBUTEROL SULFATE 2.5; .5 MG/3ML; MG/3ML
3 SOLUTION RESPIRATORY (INHALATION) EVERY 4 HOURS PRN
Status: DISCONTINUED | OUTPATIENT
Start: 2021-10-17 | End: 2021-10-27 | Stop reason: HOSPADM

## 2021-10-17 RX ORDER — ACETAMINOPHEN 160 MG/5ML
650 SOLUTION ORAL EVERY 4 HOURS PRN
Status: DISCONTINUED | OUTPATIENT
Start: 2021-10-17 | End: 2021-10-27 | Stop reason: HOSPADM

## 2021-10-17 RX ORDER — ACETAMINOPHEN 325 MG/1
650 TABLET ORAL EVERY 4 HOURS PRN
Status: DISCONTINUED | OUTPATIENT
Start: 2021-10-17 | End: 2021-10-27 | Stop reason: HOSPADM

## 2021-10-17 RX ORDER — SODIUM CHLORIDE 0.9 % (FLUSH) 0.9 %
10 SYRINGE (ML) INJECTION AS NEEDED
Status: DISCONTINUED | OUTPATIENT
Start: 2021-10-17 | End: 2021-10-27 | Stop reason: HOSPADM

## 2021-10-17 RX ORDER — SODIUM CHLORIDE 0.9 % (FLUSH) 0.9 %
10 SYRINGE (ML) INJECTION EVERY 12 HOURS SCHEDULED
Status: DISCONTINUED | OUTPATIENT
Start: 2021-10-17 | End: 2021-10-27 | Stop reason: HOSPADM

## 2021-10-17 RX ORDER — ONDANSETRON 2 MG/ML
4 INJECTION INTRAMUSCULAR; INTRAVENOUS EVERY 6 HOURS PRN
Status: DISCONTINUED | OUTPATIENT
Start: 2021-10-17 | End: 2021-10-27 | Stop reason: HOSPADM

## 2021-10-17 RX ORDER — ONDANSETRON 4 MG/1
4 TABLET, FILM COATED ORAL EVERY 6 HOURS PRN
Status: DISCONTINUED | OUTPATIENT
Start: 2021-10-17 | End: 2021-10-27 | Stop reason: HOSPADM

## 2021-10-17 RX ORDER — ACETAMINOPHEN 650 MG/1
650 SUPPOSITORY RECTAL EVERY 4 HOURS PRN
Status: DISCONTINUED | OUTPATIENT
Start: 2021-10-17 | End: 2021-10-27 | Stop reason: HOSPADM

## 2021-10-17 RX ORDER — IPRATROPIUM BROMIDE AND ALBUTEROL SULFATE 2.5; .5 MG/3ML; MG/3ML
3 SOLUTION RESPIRATORY (INHALATION)
Status: DISCONTINUED | OUTPATIENT
Start: 2021-10-18 | End: 2021-10-27 | Stop reason: HOSPADM

## 2021-10-17 RX ORDER — CALCIUM CARBONATE 200(500)MG
2 TABLET,CHEWABLE ORAL 2 TIMES DAILY PRN
Status: DISCONTINUED | OUTPATIENT
Start: 2021-10-17 | End: 2021-10-27 | Stop reason: HOSPADM

## 2021-10-17 RX ORDER — NITROGLYCERIN 0.4 MG/1
0.4 TABLET SUBLINGUAL
Status: DISCONTINUED | OUTPATIENT
Start: 2021-10-17 | End: 2021-10-27 | Stop reason: HOSPADM

## 2021-10-17 RX ADMIN — VANCOMYCIN HYDROCHLORIDE 1250 MG: 10 INJECTION, POWDER, LYOPHILIZED, FOR SOLUTION INTRAVENOUS at 23:17

## 2021-10-17 RX ADMIN — TAZOBACTAM SODIUM AND PIPERACILLIN SODIUM 3.38 G: 375; 3 INJECTION, SOLUTION INTRAVENOUS at 22:30

## 2021-10-18 LAB
ANION GAP SERPL CALCULATED.3IONS-SCNC: 19.7 MMOL/L (ref 5–15)
ANION GAP SERPL CALCULATED.3IONS-SCNC: ABNORMAL MMOL/L
BASOPHILS # BLD AUTO: 0.01 10*3/MM3 (ref 0–0.2)
BASOPHILS NFR BLD AUTO: 0.1 % (ref 0–1.5)
BUN SERPL-MCNC: 64 MG/DL (ref 8–23)
BUN SERPL-MCNC: 80 MG/DL (ref 8–23)
BUN/CREAT SERPL: 54.2 (ref 7–25)
BUN/CREAT SERPL: 86 (ref 7–25)
CALCIUM SPEC-SCNC: 8.8 MG/DL (ref 8.6–10.5)
CALCIUM SPEC-SCNC: ABNORMAL MMOL/L
CHLORIDE SERPL-SCNC: 107 MMOL/L (ref 98–107)
CHLORIDE SERPL-SCNC: 107 MMOL/L (ref 98–107)
CO2 SERPL-SCNC: 18.3 MMOL/L (ref 22–29)
CO2 SERPL-SCNC: ABNORMAL MMOL/L
CREAT SERPL-MCNC: 0.93 MG/DL (ref 0.76–1.27)
CREAT SERPL-MCNC: 1.18 MG/DL (ref 0.76–1.27)
D-LACTATE SERPL-SCNC: 2.9 MMOL/L (ref 0.5–2)
DEPRECATED RDW RBC AUTO: 44.2 FL (ref 37–54)
DEPRECATED RDW RBC AUTO: 45.5 FL (ref 37–54)
EOSINOPHIL # BLD AUTO: 0 10*3/MM3 (ref 0–0.4)
EOSINOPHIL NFR BLD AUTO: 0 % (ref 0.3–6.2)
ERYTHROCYTE [DISTWIDTH] IN BLOOD BY AUTOMATED COUNT: 14.6 % (ref 12.3–15.4)
ERYTHROCYTE [DISTWIDTH] IN BLOOD BY AUTOMATED COUNT: 14.6 % (ref 12.3–15.4)
GFR SERPL CREATININE-BSD FRML MDRD: 59 ML/MIN/1.73
GFR SERPL CREATININE-BSD FRML MDRD: 78 ML/MIN/1.73
GLUCOSE BLDC GLUCOMTR-MCNC: 128 MG/DL (ref 70–130)
GLUCOSE SERPL-MCNC: 133 MG/DL (ref 65–99)
GLUCOSE SERPL-MCNC: 156 MG/DL (ref 65–99)
HCT VFR BLD AUTO: 41.7 % (ref 37.5–51)
HCT VFR BLD AUTO: 41.8 % (ref 37.5–51)
HGB BLD-MCNC: 13.3 G/DL (ref 13–17.7)
HGB BLD-MCNC: 13.4 G/DL (ref 13–17.7)
IMM GRANULOCYTES # BLD AUTO: 0.05 10*3/MM3 (ref 0–0.05)
IMM GRANULOCYTES NFR BLD AUTO: 0.5 % (ref 0–0.5)
INR PPP: 5.33 (ref 0.9–1.1)
INR PPP: 6.39 (ref 0.9–1.1)
LYMPHOCYTES # BLD AUTO: 0.57 10*3/MM3 (ref 0.7–3.1)
LYMPHOCYTES NFR BLD AUTO: 5.8 % (ref 19.6–45.3)
MAGNESIUM SERPL-MCNC: 2.6 MG/DL (ref 1.6–2.4)
MCH RBC QN AUTO: 27.3 PG (ref 26.6–33)
MCH RBC QN AUTO: 28.2 PG (ref 26.6–33)
MCHC RBC AUTO-ENTMCNC: 31.9 G/DL (ref 31.5–35.7)
MCHC RBC AUTO-ENTMCNC: 32.1 G/DL (ref 31.5–35.7)
MCV RBC AUTO: 85.6 FL (ref 79–97)
MCV RBC AUTO: 87.8 FL (ref 79–97)
MONOCYTES # BLD AUTO: 0.64 10*3/MM3 (ref 0.1–0.9)
MONOCYTES NFR BLD AUTO: 6.5 % (ref 5–12)
MRSA DNA SPEC QL NAA+PROBE: NORMAL
NEUTROPHILS NFR BLD AUTO: 8.6 10*3/MM3 (ref 1.7–7)
NEUTROPHILS NFR BLD AUTO: 87.1 % (ref 42.7–76)
NRBC BLD AUTO-RTO: 0.1 /100 WBC (ref 0–0.2)
PHOSPHATE SERPL-MCNC: 4.7 MG/DL (ref 2.5–4.5)
PLATELET # BLD AUTO: 320 10*3/MM3 (ref 140–450)
PLATELET # BLD AUTO: 349 10*3/MM3 (ref 140–450)
PMV BLD AUTO: 10.3 FL (ref 6–12)
PMV BLD AUTO: 10.4 FL (ref 6–12)
POTASSIUM SERPL-SCNC: 4.3 MMOL/L (ref 3.5–5.2)
POTASSIUM SERPL-SCNC: 4.9 MMOL/L (ref 3.5–5.2)
PROTHROMBIN TIME: 48.6 SECONDS (ref 11.7–14.2)
PROTHROMBIN TIME: 56 SECONDS (ref 11.7–14.2)
QT INTERVAL: 306 MS
QT INTERVAL: 384 MS
RBC # BLD AUTO: 4.76 10*6/MM3 (ref 4.14–5.8)
RBC # BLD AUTO: 4.87 10*6/MM3 (ref 4.14–5.8)
SODIUM SERPL-SCNC: 145 MMOL/L (ref 136–145)
SODIUM SERPL-SCNC: 145 MMOL/L (ref 136–145)
WBC # BLD AUTO: 10.68 10*3/MM3 (ref 3.4–10.8)
WBC # BLD AUTO: 9.87 10*3/MM3 (ref 3.4–10.8)

## 2021-10-18 PROCEDURE — 83605 ASSAY OF LACTIC ACID: CPT | Performed by: EMERGENCY MEDICINE

## 2021-10-18 PROCEDURE — 94799 UNLISTED PULMONARY SVC/PX: CPT

## 2021-10-18 PROCEDURE — 84100 ASSAY OF PHOSPHORUS: CPT | Performed by: STUDENT IN AN ORGANIZED HEALTH CARE EDUCATION/TRAINING PROGRAM

## 2021-10-18 PROCEDURE — 85027 COMPLETE CBC AUTOMATED: CPT | Performed by: NURSE PRACTITIONER

## 2021-10-18 PROCEDURE — 82962 GLUCOSE BLOOD TEST: CPT

## 2021-10-18 PROCEDURE — 92610 EVALUATE SWALLOWING FUNCTION: CPT

## 2021-10-18 PROCEDURE — 94640 AIRWAY INHALATION TREATMENT: CPT

## 2021-10-18 PROCEDURE — 80048 BASIC METABOLIC PNL TOTAL CA: CPT | Performed by: NURSE PRACTITIONER

## 2021-10-18 PROCEDURE — 85610 PROTHROMBIN TIME: CPT | Performed by: NURSE PRACTITIONER

## 2021-10-18 PROCEDURE — 97162 PT EVAL MOD COMPLEX 30 MIN: CPT

## 2021-10-18 PROCEDURE — 83735 ASSAY OF MAGNESIUM: CPT | Performed by: STUDENT IN AN ORGANIZED HEALTH CARE EDUCATION/TRAINING PROGRAM

## 2021-10-18 PROCEDURE — 85025 COMPLETE CBC W/AUTO DIFF WBC: CPT | Performed by: STUDENT IN AN ORGANIZED HEALTH CARE EDUCATION/TRAINING PROGRAM

## 2021-10-18 PROCEDURE — 80048 BASIC METABOLIC PNL TOTAL CA: CPT | Performed by: STUDENT IN AN ORGANIZED HEALTH CARE EDUCATION/TRAINING PROGRAM

## 2021-10-18 PROCEDURE — 25010000002 PIPERACILLIN SOD-TAZOBACTAM PER 1 G: Performed by: NURSE PRACTITIONER

## 2021-10-18 PROCEDURE — 97110 THERAPEUTIC EXERCISES: CPT

## 2021-10-18 PROCEDURE — 93005 ELECTROCARDIOGRAM TRACING: CPT | Performed by: INTERNAL MEDICINE

## 2021-10-18 PROCEDURE — 99222 1ST HOSP IP/OBS MODERATE 55: CPT | Performed by: INTERNAL MEDICINE

## 2021-10-18 PROCEDURE — 85610 PROTHROMBIN TIME: CPT | Performed by: STUDENT IN AN ORGANIZED HEALTH CARE EDUCATION/TRAINING PROGRAM

## 2021-10-18 PROCEDURE — 87641 MR-STAPH DNA AMP PROBE: CPT | Performed by: NURSE PRACTITIONER

## 2021-10-18 PROCEDURE — 36415 COLL VENOUS BLD VENIPUNCTURE: CPT | Performed by: NURSE PRACTITIONER

## 2021-10-18 PROCEDURE — 93010 ELECTROCARDIOGRAM REPORT: CPT | Performed by: INTERNAL MEDICINE

## 2021-10-18 PROCEDURE — 83605 ASSAY OF LACTIC ACID: CPT | Performed by: STUDENT IN AN ORGANIZED HEALTH CARE EDUCATION/TRAINING PROGRAM

## 2021-10-18 RX ORDER — SENNA PLUS 8.6 MG/1
2 TABLET ORAL NIGHTLY
Status: DISCONTINUED | OUTPATIENT
Start: 2021-10-18 | End: 2021-10-27 | Stop reason: HOSPADM

## 2021-10-18 RX ORDER — GABAPENTIN 100 MG/1
100 CAPSULE ORAL 3 TIMES DAILY
COMMUNITY
Start: 2021-09-08 | End: 2021-10-27 | Stop reason: HOSPADM

## 2021-10-18 RX ORDER — ASPIRIN 81 MG/1
81 TABLET ORAL DAILY
Status: DISCONTINUED | OUTPATIENT
Start: 2021-10-18 | End: 2021-10-27 | Stop reason: HOSPADM

## 2021-10-18 RX ORDER — METOPROLOL TARTRATE 5 MG/5ML
INJECTION INTRAVENOUS
Status: COMPLETED
Start: 2021-10-18 | End: 2021-10-18

## 2021-10-18 RX ORDER — HYDROCODONE BITARTRATE AND ACETAMINOPHEN 5; 325 MG/1; MG/1
1 TABLET ORAL EVERY 6 HOURS PRN
Status: DISCONTINUED | OUTPATIENT
Start: 2021-10-18 | End: 2021-10-27 | Stop reason: HOSPADM

## 2021-10-18 RX ORDER — PANTOPRAZOLE SODIUM 40 MG/1
40 TABLET, DELAYED RELEASE ORAL EVERY MORNING
Status: DISCONTINUED | OUTPATIENT
Start: 2021-10-18 | End: 2021-10-27 | Stop reason: HOSPADM

## 2021-10-18 RX ORDER — FINASTERIDE 5 MG/1
5 TABLET, FILM COATED ORAL DAILY
Status: DISCONTINUED | OUTPATIENT
Start: 2021-10-18 | End: 2021-10-27 | Stop reason: HOSPADM

## 2021-10-18 RX ORDER — GABAPENTIN 100 MG/1
100 CAPSULE ORAL 3 TIMES DAILY
Status: DISCONTINUED | OUTPATIENT
Start: 2021-10-18 | End: 2021-10-19

## 2021-10-18 RX ORDER — HYDROCODONE BITARTRATE AND ACETAMINOPHEN 5; 325 MG/1; MG/1
1 TABLET ORAL
COMMUNITY
Start: 2021-10-17

## 2021-10-18 RX ORDER — LEFLUNOMIDE 20 MG/1
10 TABLET ORAL DAILY
Status: DISCONTINUED | OUTPATIENT
Start: 2021-10-18 | End: 2021-10-18

## 2021-10-18 RX ADMIN — IPRATROPIUM BROMIDE AND ALBUTEROL SULFATE 3 ML: 2.5; .5 SOLUTION RESPIRATORY (INHALATION) at 07:37

## 2021-10-18 RX ADMIN — METOPROLOL TARTRATE 12.5 MG: 25 TABLET, FILM COATED ORAL at 10:24

## 2021-10-18 RX ADMIN — TAZOBACTAM SODIUM AND PIPERACILLIN SODIUM 3.38 G: 375; 3 INJECTION, SOLUTION INTRAVENOUS at 20:38

## 2021-10-18 RX ADMIN — SENNOSIDES 2 TABLET: 8.6 TABLET, FILM COATED ORAL at 20:38

## 2021-10-18 RX ADMIN — TAZOBACTAM SODIUM AND PIPERACILLIN SODIUM 3.38 G: 375; 3 INJECTION, SOLUTION INTRAVENOUS at 12:24

## 2021-10-18 RX ADMIN — FINASTERIDE 5 MG: 5 TABLET, FILM COATED ORAL at 10:24

## 2021-10-18 RX ADMIN — METOPROLOL TARTRATE 5 MG: 5 INJECTION, SOLUTION INTRAVENOUS at 05:10

## 2021-10-18 RX ADMIN — METOPROLOL TARTRATE 12.5 MG: 25 TABLET, FILM COATED ORAL at 20:38

## 2021-10-18 RX ADMIN — PANTOPRAZOLE SODIUM 40 MG: 40 TABLET, DELAYED RELEASE ORAL at 10:24

## 2021-10-18 RX ADMIN — GABAPENTIN 100 MG: 100 CAPSULE ORAL at 10:23

## 2021-10-18 RX ADMIN — IPRATROPIUM BROMIDE AND ALBUTEROL SULFATE 3 ML: 2.5; .5 SOLUTION RESPIRATORY (INHALATION) at 19:56

## 2021-10-18 RX ADMIN — ASPIRIN 81 MG: 81 TABLET, COATED ORAL at 10:23

## 2021-10-18 RX ADMIN — TAZOBACTAM SODIUM AND PIPERACILLIN SODIUM 3.38 G: 375; 3 INJECTION, SOLUTION INTRAVENOUS at 06:00

## 2021-10-18 RX ADMIN — GABAPENTIN 100 MG: 100 CAPSULE ORAL at 16:25

## 2021-10-18 RX ADMIN — SODIUM CHLORIDE, PRESERVATIVE FREE 10 ML: 5 INJECTION INTRAVENOUS at 08:31

## 2021-10-18 RX ADMIN — GABAPENTIN 100 MG: 100 CAPSULE ORAL at 20:38

## 2021-10-18 RX ADMIN — SODIUM CHLORIDE 500 ML: 9 INJECTION, SOLUTION INTRAVENOUS at 01:08

## 2021-10-19 ENCOUNTER — APPOINTMENT (OUTPATIENT)
Dept: CT IMAGING | Facility: HOSPITAL | Age: 81
End: 2021-10-19

## 2021-10-19 LAB
ANION GAP SERPL CALCULATED.3IONS-SCNC: 17.4 MMOL/L (ref 5–15)
BASOPHILS # BLD AUTO: 0.01 10*3/MM3 (ref 0–0.2)
BASOPHILS NFR BLD AUTO: 0.1 % (ref 0–1.5)
BUN SERPL-MCNC: 65 MG/DL (ref 8–23)
BUN/CREAT SERPL: 59.6 (ref 7–25)
CALCIUM SPEC-SCNC: 8.9 MG/DL (ref 8.6–10.5)
CHLORIDE SERPL-SCNC: 110 MMOL/L (ref 98–107)
CO2 SERPL-SCNC: 18.6 MMOL/L (ref 22–29)
CREAT SERPL-MCNC: 1.09 MG/DL (ref 0.76–1.27)
D-LACTATE SERPL-SCNC: 2.9 MMOL/L (ref 0.5–2)
DEPRECATED RDW RBC AUTO: 43.6 FL (ref 37–54)
EOSINOPHIL # BLD AUTO: 0 10*3/MM3 (ref 0–0.4)
EOSINOPHIL NFR BLD AUTO: 0 % (ref 0.3–6.2)
ERYTHROCYTE [DISTWIDTH] IN BLOOD BY AUTOMATED COUNT: 14.6 % (ref 12.3–15.4)
GFR SERPL CREATININE-BSD FRML MDRD: 65 ML/MIN/1.73
GLUCOSE SERPL-MCNC: 132 MG/DL (ref 65–99)
HCT VFR BLD AUTO: 40.3 % (ref 37.5–51)
HGB BLD-MCNC: 13.3 G/DL (ref 13–17.7)
IMM GRANULOCYTES # BLD AUTO: 0.06 10*3/MM3 (ref 0–0.05)
IMM GRANULOCYTES NFR BLD AUTO: 0.5 % (ref 0–0.5)
INR PPP: 6.22 (ref 0.9–1.1)
LYMPHOCYTES # BLD AUTO: 0.67 10*3/MM3 (ref 0.7–3.1)
LYMPHOCYTES NFR BLD AUTO: 6.1 % (ref 19.6–45.3)
MAGNESIUM SERPL-MCNC: 2.8 MG/DL (ref 1.6–2.4)
MCH RBC QN AUTO: 27.8 PG (ref 26.6–33)
MCHC RBC AUTO-ENTMCNC: 33 G/DL (ref 31.5–35.7)
MCV RBC AUTO: 84.1 FL (ref 79–97)
MONOCYTES # BLD AUTO: 0.93 10*3/MM3 (ref 0.1–0.9)
MONOCYTES NFR BLD AUTO: 8.4 % (ref 5–12)
NEUTROPHILS NFR BLD AUTO: 84.9 % (ref 42.7–76)
NEUTROPHILS NFR BLD AUTO: 9.36 10*3/MM3 (ref 1.7–7)
NRBC BLD AUTO-RTO: 0.1 /100 WBC (ref 0–0.2)
PHOSPHATE SERPL-MCNC: 5 MG/DL (ref 2.5–4.5)
PLATELET # BLD AUTO: 321 10*3/MM3 (ref 140–450)
PMV BLD AUTO: 10.3 FL (ref 6–12)
POTASSIUM SERPL-SCNC: 4.4 MMOL/L (ref 3.5–5.2)
PROTHROMBIN TIME: 54.8 SECONDS (ref 11.7–14.2)
RBC # BLD AUTO: 4.79 10*6/MM3 (ref 4.14–5.8)
SODIUM SERPL-SCNC: 146 MMOL/L (ref 136–145)
WBC # BLD AUTO: 11.03 10*3/MM3 (ref 3.4–10.8)

## 2021-10-19 PROCEDURE — 85610 PROTHROMBIN TIME: CPT | Performed by: NURSE PRACTITIONER

## 2021-10-19 PROCEDURE — 97165 OT EVAL LOW COMPLEX 30 MIN: CPT

## 2021-10-19 PROCEDURE — 97535 SELF CARE MNGMENT TRAINING: CPT

## 2021-10-19 PROCEDURE — 0 IOPAMIDOL PER 1 ML: Performed by: HOSPITALIST

## 2021-10-19 PROCEDURE — 36415 COLL VENOUS BLD VENIPUNCTURE: CPT | Performed by: NURSE PRACTITIONER

## 2021-10-19 PROCEDURE — 84100 ASSAY OF PHOSPHORUS: CPT | Performed by: STUDENT IN AN ORGANIZED HEALTH CARE EDUCATION/TRAINING PROGRAM

## 2021-10-19 PROCEDURE — 97110 THERAPEUTIC EXERCISES: CPT

## 2021-10-19 PROCEDURE — 85025 COMPLETE CBC W/AUTO DIFF WBC: CPT | Performed by: STUDENT IN AN ORGANIZED HEALTH CARE EDUCATION/TRAINING PROGRAM

## 2021-10-19 PROCEDURE — 94799 UNLISTED PULMONARY SVC/PX: CPT

## 2021-10-19 PROCEDURE — 71275 CT ANGIOGRAPHY CHEST: CPT

## 2021-10-19 PROCEDURE — 99232 SBSQ HOSP IP/OBS MODERATE 35: CPT | Performed by: NURSE PRACTITIONER

## 2021-10-19 PROCEDURE — 25010000002 PIPERACILLIN SOD-TAZOBACTAM PER 1 G: Performed by: NURSE PRACTITIONER

## 2021-10-19 PROCEDURE — 83735 ASSAY OF MAGNESIUM: CPT | Performed by: STUDENT IN AN ORGANIZED HEALTH CARE EDUCATION/TRAINING PROGRAM

## 2021-10-19 PROCEDURE — 80048 BASIC METABOLIC PNL TOTAL CA: CPT | Performed by: NURSE PRACTITIONER

## 2021-10-19 RX ORDER — GABAPENTIN 100 MG/1
100 CAPSULE ORAL NIGHTLY
Status: DISCONTINUED | OUTPATIENT
Start: 2021-10-19 | End: 2021-10-27 | Stop reason: HOSPADM

## 2021-10-19 RX ADMIN — SENNOSIDES 2 TABLET: 8.6 TABLET, FILM COATED ORAL at 21:04

## 2021-10-19 RX ADMIN — TAZOBACTAM SODIUM AND PIPERACILLIN SODIUM 3.38 G: 375; 3 INJECTION, SOLUTION INTRAVENOUS at 11:30

## 2021-10-19 RX ADMIN — GABAPENTIN 100 MG: 100 CAPSULE ORAL at 14:58

## 2021-10-19 RX ADMIN — IOPAMIDOL 100 ML: 755 INJECTION, SOLUTION INTRAVENOUS at 09:28

## 2021-10-19 RX ADMIN — GABAPENTIN 100 MG: 100 CAPSULE ORAL at 08:10

## 2021-10-19 RX ADMIN — FINASTERIDE 5 MG: 5 TABLET, FILM COATED ORAL at 08:10

## 2021-10-19 RX ADMIN — METOPROLOL TARTRATE 12.5 MG: 25 TABLET, FILM COATED ORAL at 21:04

## 2021-10-19 RX ADMIN — ASPIRIN 81 MG: 81 TABLET, COATED ORAL at 08:10

## 2021-10-19 RX ADMIN — IPRATROPIUM BROMIDE AND ALBUTEROL SULFATE 3 ML: 2.5; .5 SOLUTION RESPIRATORY (INHALATION) at 11:08

## 2021-10-19 RX ADMIN — TAZOBACTAM SODIUM AND PIPERACILLIN SODIUM 3.38 G: 375; 3 INJECTION, SOLUTION INTRAVENOUS at 05:00

## 2021-10-19 RX ADMIN — IPRATROPIUM BROMIDE AND ALBUTEROL SULFATE 3 ML: 2.5; .5 SOLUTION RESPIRATORY (INHALATION) at 06:58

## 2021-10-19 RX ADMIN — PANTOPRAZOLE SODIUM 40 MG: 40 TABLET, DELAYED RELEASE ORAL at 06:26

## 2021-10-19 RX ADMIN — GABAPENTIN 100 MG: 100 CAPSULE ORAL at 21:04

## 2021-10-19 RX ADMIN — SODIUM CHLORIDE, PRESERVATIVE FREE 10 ML: 5 INJECTION INTRAVENOUS at 08:11

## 2021-10-19 RX ADMIN — METOPROLOL TARTRATE 12.5 MG: 25 TABLET, FILM COATED ORAL at 08:10

## 2021-10-20 PROBLEM — R79.89 AZOTEMIA: Status: ACTIVE | Noted: 2021-10-20

## 2021-10-20 PROBLEM — E79.0 HYPERURICEMIA: Status: ACTIVE | Noted: 2021-10-20

## 2021-10-20 PROBLEM — J90 PLEURAL EFFUSION: Status: ACTIVE | Noted: 2021-10-20

## 2021-10-20 PROBLEM — I50.33 ACUTE ON CHRONIC DIASTOLIC CHF (CONGESTIVE HEART FAILURE) (HCC): Status: ACTIVE | Noted: 2021-10-20

## 2021-10-20 PROBLEM — I95.9 SYMPTOMATIC HYPOTENSION: Status: ACTIVE | Noted: 2021-10-20

## 2021-10-20 PROBLEM — I47.1 SVT (SUPRAVENTRICULAR TACHYCARDIA) (HCC): Status: ACTIVE | Noted: 2021-10-20

## 2021-10-20 LAB
ANION GAP SERPL CALCULATED.3IONS-SCNC: 13.6 MMOL/L (ref 5–15)
BUN SERPL-MCNC: 68 MG/DL (ref 8–23)
BUN/CREAT SERPL: 66.7 (ref 7–25)
CALCIUM SPEC-SCNC: 9.2 MG/DL (ref 8.6–10.5)
CHLORIDE SERPL-SCNC: 113 MMOL/L (ref 98–107)
CO2 SERPL-SCNC: 21.4 MMOL/L (ref 22–29)
CREAT SERPL-MCNC: 1.02 MG/DL (ref 0.76–1.27)
CREAT UR-MCNC: 73.2 MG/DL
DEPRECATED RDW RBC AUTO: 45 FL (ref 37–54)
ERYTHROCYTE [DISTWIDTH] IN BLOOD BY AUTOMATED COUNT: 14.8 % (ref 12.3–15.4)
GFR SERPL CREATININE-BSD FRML MDRD: 70 ML/MIN/1.73
GLUCOSE SERPL-MCNC: 131 MG/DL (ref 65–99)
HCT VFR BLD AUTO: 39.1 % (ref 37.5–51)
HGB BLD-MCNC: 12.7 G/DL (ref 13–17.7)
INR PPP: 4.97 (ref 0.9–1.1)
MCH RBC QN AUTO: 27.9 PG (ref 26.6–33)
MCHC RBC AUTO-ENTMCNC: 32.5 G/DL (ref 31.5–35.7)
MCV RBC AUTO: 85.9 FL (ref 79–97)
OSMOLALITY SERPL: 337 MOSM/KG (ref 280–301)
PLATELET # BLD AUTO: 289 10*3/MM3 (ref 140–450)
PMV BLD AUTO: 10.4 FL (ref 6–12)
POTASSIUM SERPL-SCNC: 4.5 MMOL/L (ref 3.5–5.2)
PROCALCITONIN SERPL-MCNC: 0.07 NG/ML (ref 0–0.25)
PROTHROMBIN TIME: 46 SECONDS (ref 11.7–14.2)
QT INTERVAL: 308 MS
RBC # BLD AUTO: 4.55 10*6/MM3 (ref 4.14–5.8)
SODIUM SERPL-SCNC: 148 MMOL/L (ref 136–145)
SODIUM UR-SCNC: <20 MMOL/L
URATE SERPL-MCNC: 11.7 MG/DL (ref 3.4–7)
WBC # BLD AUTO: 11.23 10*3/MM3 (ref 3.4–10.8)

## 2021-10-20 PROCEDURE — 99222 1ST HOSP IP/OBS MODERATE 55: CPT | Performed by: NURSE PRACTITIONER

## 2021-10-20 PROCEDURE — 36415 COLL VENOUS BLD VENIPUNCTURE: CPT | Performed by: NURSE PRACTITIONER

## 2021-10-20 PROCEDURE — 25010000002 DIGOXIN PER 500 MCG

## 2021-10-20 PROCEDURE — 99232 SBSQ HOSP IP/OBS MODERATE 35: CPT | Performed by: NURSE PRACTITIONER

## 2021-10-20 PROCEDURE — 93010 ELECTROCARDIOGRAM REPORT: CPT | Performed by: INTERNAL MEDICINE

## 2021-10-20 PROCEDURE — 83930 ASSAY OF BLOOD OSMOLALITY: CPT | Performed by: HOSPITALIST

## 2021-10-20 PROCEDURE — 84550 ASSAY OF BLOOD/URIC ACID: CPT | Performed by: HOSPITALIST

## 2021-10-20 PROCEDURE — 82570 ASSAY OF URINE CREATININE: CPT | Performed by: INTERNAL MEDICINE

## 2021-10-20 PROCEDURE — 84145 PROCALCITONIN (PCT): CPT | Performed by: INTERNAL MEDICINE

## 2021-10-20 PROCEDURE — 94799 UNLISTED PULMONARY SVC/PX: CPT

## 2021-10-20 PROCEDURE — 93005 ELECTROCARDIOGRAM TRACING: CPT | Performed by: INTERNAL MEDICINE

## 2021-10-20 PROCEDURE — 84300 ASSAY OF URINE SODIUM: CPT | Performed by: INTERNAL MEDICINE

## 2021-10-20 PROCEDURE — 85610 PROTHROMBIN TIME: CPT | Performed by: NURSE PRACTITIONER

## 2021-10-20 PROCEDURE — 85027 COMPLETE CBC AUTOMATED: CPT | Performed by: HOSPITALIST

## 2021-10-20 PROCEDURE — 80048 BASIC METABOLIC PNL TOTAL CA: CPT | Performed by: HOSPITALIST

## 2021-10-20 RX ORDER — DIGOXIN 0.25 MG/ML
INJECTION INTRAMUSCULAR; INTRAVENOUS
Status: COMPLETED
Start: 2021-10-20 | End: 2021-10-20

## 2021-10-20 RX ORDER — SODIUM CHLORIDE 450 MG/100ML
75 INJECTION, SOLUTION INTRAVENOUS CONTINUOUS
Status: DISCONTINUED | OUTPATIENT
Start: 2021-10-20 | End: 2021-10-22

## 2021-10-20 RX ORDER — DIGOXIN 0.25 MG/ML
250 INJECTION INTRAMUSCULAR; INTRAVENOUS ONCE
Status: COMPLETED | OUTPATIENT
Start: 2021-10-20 | End: 2021-10-20

## 2021-10-20 RX ADMIN — IPRATROPIUM BROMIDE AND ALBUTEROL SULFATE 3 ML: 2.5; .5 SOLUTION RESPIRATORY (INHALATION) at 00:05

## 2021-10-20 RX ADMIN — SODIUM CHLORIDE 75 ML/HR: 4.5 INJECTION, SOLUTION INTRAVENOUS at 17:12

## 2021-10-20 RX ADMIN — FINASTERIDE 5 MG: 5 TABLET, FILM COATED ORAL at 08:23

## 2021-10-20 RX ADMIN — DIGOXIN 250 MCG: 0.25 INJECTION INTRAMUSCULAR; INTRAVENOUS at 00:58

## 2021-10-20 RX ADMIN — METOPROLOL TARTRATE 5 MG: 5 INJECTION, SOLUTION INTRAVENOUS at 02:40

## 2021-10-20 RX ADMIN — SODIUM CHLORIDE 250 ML: 9 INJECTION, SOLUTION INTRAVENOUS at 02:49

## 2021-10-20 RX ADMIN — DIGOXIN 250 MCG: 0.25 INJECTION INTRAMUSCULAR; INTRAVENOUS at 02:30

## 2021-10-20 RX ADMIN — IPRATROPIUM BROMIDE AND ALBUTEROL SULFATE 3 ML: 2.5; .5 SOLUTION RESPIRATORY (INHALATION) at 13:25

## 2021-10-20 RX ADMIN — METOPROLOL TARTRATE 12.5 MG: 25 TABLET, FILM COATED ORAL at 08:23

## 2021-10-20 RX ADMIN — ASPIRIN 81 MG: 81 TABLET, COATED ORAL at 08:23

## 2021-10-20 RX ADMIN — IPRATROPIUM BROMIDE AND ALBUTEROL SULFATE 3 ML: 2.5; .5 SOLUTION RESPIRATORY (INHALATION) at 19:32

## 2021-10-20 RX ADMIN — GABAPENTIN 100 MG: 100 CAPSULE ORAL at 20:07

## 2021-10-20 RX ADMIN — METOPROLOL TARTRATE 12.5 MG: 25 TABLET, FILM COATED ORAL at 20:07

## 2021-10-20 RX ADMIN — SODIUM CHLORIDE, PRESERVATIVE FREE 10 ML: 5 INJECTION INTRAVENOUS at 20:07

## 2021-10-20 RX ADMIN — METOPROLOL TARTRATE 5 MG: 5 INJECTION, SOLUTION INTRAVENOUS at 01:20

## 2021-10-20 RX ADMIN — PANTOPRAZOLE SODIUM 40 MG: 40 TABLET, DELAYED RELEASE ORAL at 08:23

## 2021-10-20 RX ADMIN — METOPROLOL TARTRATE 5 MG: 5 INJECTION, SOLUTION INTRAVENOUS at 03:34

## 2021-10-20 RX ADMIN — SODIUM CHLORIDE, PRESERVATIVE FREE 10 ML: 5 INJECTION INTRAVENOUS at 08:24

## 2021-10-20 RX ADMIN — SENNOSIDES 2 TABLET: 8.6 TABLET, FILM COATED ORAL at 20:07

## 2021-10-21 LAB
ANION GAP SERPL CALCULATED.3IONS-SCNC: 14.4 MMOL/L (ref 5–15)
BUN SERPL-MCNC: 59 MG/DL (ref 8–23)
BUN/CREAT SERPL: 60.8 (ref 7–25)
CALCIUM SPEC-SCNC: 8.7 MG/DL (ref 8.6–10.5)
CHLORIDE SERPL-SCNC: 110 MMOL/L (ref 98–107)
CO2 SERPL-SCNC: 21.6 MMOL/L (ref 22–29)
CREAT SERPL-MCNC: 0.97 MG/DL (ref 0.76–1.27)
DEPRECATED RDW RBC AUTO: 44.5 FL (ref 37–54)
ERYTHROCYTE [DISTWIDTH] IN BLOOD BY AUTOMATED COUNT: 14.8 % (ref 12.3–15.4)
GFR SERPL CREATININE-BSD FRML MDRD: 74 ML/MIN/1.73
GLUCOSE SERPL-MCNC: 108 MG/DL (ref 65–99)
HCT VFR BLD AUTO: 37.6 % (ref 37.5–51)
HGB BLD-MCNC: 12 G/DL (ref 13–17.7)
INR PPP: 2.66 (ref 0.9–1.1)
MCH RBC QN AUTO: 27.5 PG (ref 26.6–33)
MCHC RBC AUTO-ENTMCNC: 31.9 G/DL (ref 31.5–35.7)
MCV RBC AUTO: 86.2 FL (ref 79–97)
PLATELET # BLD AUTO: 259 10*3/MM3 (ref 140–450)
PMV BLD AUTO: 10.6 FL (ref 6–12)
POTASSIUM SERPL-SCNC: 4.1 MMOL/L (ref 3.5–5.2)
PROTHROMBIN TIME: 28.1 SECONDS (ref 11.7–14.2)
RBC # BLD AUTO: 4.36 10*6/MM3 (ref 4.14–5.8)
SODIUM SERPL-SCNC: 146 MMOL/L (ref 136–145)
URATE SERPL-MCNC: 11 MG/DL (ref 3.4–7)
WBC # BLD AUTO: 11.84 10*3/MM3 (ref 3.4–10.8)

## 2021-10-21 PROCEDURE — 85610 PROTHROMBIN TIME: CPT | Performed by: HOSPITALIST

## 2021-10-21 PROCEDURE — 94799 UNLISTED PULMONARY SVC/PX: CPT

## 2021-10-21 PROCEDURE — 97530 THERAPEUTIC ACTIVITIES: CPT

## 2021-10-21 PROCEDURE — 85027 COMPLETE CBC AUTOMATED: CPT | Performed by: HOSPITALIST

## 2021-10-21 PROCEDURE — 99232 SBSQ HOSP IP/OBS MODERATE 35: CPT | Performed by: NURSE PRACTITIONER

## 2021-10-21 PROCEDURE — 84550 ASSAY OF BLOOD/URIC ACID: CPT | Performed by: HOSPITALIST

## 2021-10-21 PROCEDURE — 80048 BASIC METABOLIC PNL TOTAL CA: CPT | Performed by: HOSPITALIST

## 2021-10-21 RX ADMIN — IPRATROPIUM BROMIDE AND ALBUTEROL SULFATE 3 ML: 2.5; .5 SOLUTION RESPIRATORY (INHALATION) at 19:58

## 2021-10-21 RX ADMIN — GABAPENTIN 100 MG: 100 CAPSULE ORAL at 20:16

## 2021-10-21 RX ADMIN — IPRATROPIUM BROMIDE AND ALBUTEROL SULFATE 3 ML: 2.5; .5 SOLUTION RESPIRATORY (INHALATION) at 12:12

## 2021-10-21 RX ADMIN — SODIUM CHLORIDE, PRESERVATIVE FREE 10 ML: 5 INJECTION INTRAVENOUS at 20:16

## 2021-10-21 RX ADMIN — SODIUM CHLORIDE, PRESERVATIVE FREE 10 ML: 5 INJECTION INTRAVENOUS at 08:59

## 2021-10-21 RX ADMIN — PANTOPRAZOLE SODIUM 40 MG: 40 TABLET, DELAYED RELEASE ORAL at 06:01

## 2021-10-21 RX ADMIN — ASPIRIN 81 MG: 81 TABLET, COATED ORAL at 08:59

## 2021-10-21 RX ADMIN — FINASTERIDE 5 MG: 5 TABLET, FILM COATED ORAL at 08:59

## 2021-10-21 RX ADMIN — SODIUM CHLORIDE 75 ML/HR: 4.5 INJECTION, SOLUTION INTRAVENOUS at 19:01

## 2021-10-21 RX ADMIN — SENNOSIDES 2 TABLET: 8.6 TABLET, FILM COATED ORAL at 20:16

## 2021-10-21 RX ADMIN — METOPROLOL TARTRATE 12.5 MG: 25 TABLET, FILM COATED ORAL at 20:16

## 2021-10-21 RX ADMIN — SODIUM CHLORIDE 75 ML/HR: 4.5 INJECTION, SOLUTION INTRAVENOUS at 06:01

## 2021-10-21 RX ADMIN — IPRATROPIUM BROMIDE AND ALBUTEROL SULFATE 3 ML: 2.5; .5 SOLUTION RESPIRATORY (INHALATION) at 08:11

## 2021-10-21 RX ADMIN — METOPROLOL TARTRATE 12.5 MG: 25 TABLET, FILM COATED ORAL at 08:59

## 2021-10-22 LAB
ANION GAP SERPL CALCULATED.3IONS-SCNC: 12.8 MMOL/L (ref 5–15)
BACTERIA SPEC AEROBE CULT: NORMAL
BACTERIA SPEC AEROBE CULT: NORMAL
BUN SERPL-MCNC: 53 MG/DL (ref 8–23)
BUN/CREAT SERPL: 62.4 (ref 7–25)
CALCIUM SPEC-SCNC: 8.5 MG/DL (ref 8.6–10.5)
CHLORIDE SERPL-SCNC: 109 MMOL/L (ref 98–107)
CO2 SERPL-SCNC: 20.2 MMOL/L (ref 22–29)
CREAT SERPL-MCNC: 0.85 MG/DL (ref 0.76–1.27)
DEPRECATED RDW RBC AUTO: 50.1 FL (ref 37–54)
ERYTHROCYTE [DISTWIDTH] IN BLOOD BY AUTOMATED COUNT: 15.9 % (ref 12.3–15.4)
GFR SERPL CREATININE-BSD FRML MDRD: 87 ML/MIN/1.73
GLUCOSE SERPL-MCNC: 128 MG/DL (ref 65–99)
HCT VFR BLD AUTO: 37.7 % (ref 37.5–51)
HGB BLD-MCNC: 11.9 G/DL (ref 13–17.7)
INR PPP: 2.22 (ref 0.9–1.1)
MCH RBC QN AUTO: 28.1 PG (ref 26.6–33)
MCHC RBC AUTO-ENTMCNC: 31.6 G/DL (ref 31.5–35.7)
MCV RBC AUTO: 88.9 FL (ref 79–97)
PLATELET # BLD AUTO: 244 10*3/MM3 (ref 140–450)
PMV BLD AUTO: 10.7 FL (ref 6–12)
POTASSIUM SERPL-SCNC: 4.3 MMOL/L (ref 3.5–5.2)
PROTHROMBIN TIME: 24.4 SECONDS (ref 11.7–14.2)
RBC # BLD AUTO: 4.24 10*6/MM3 (ref 4.14–5.8)
SODIUM SERPL-SCNC: 142 MMOL/L (ref 136–145)
URATE SERPL-MCNC: 10.5 MG/DL (ref 3.4–7)
WBC # BLD AUTO: 10.86 10*3/MM3 (ref 3.4–10.8)

## 2021-10-22 PROCEDURE — 85027 COMPLETE CBC AUTOMATED: CPT | Performed by: HOSPITALIST

## 2021-10-22 PROCEDURE — 94799 UNLISTED PULMONARY SVC/PX: CPT

## 2021-10-22 PROCEDURE — 97530 THERAPEUTIC ACTIVITIES: CPT

## 2021-10-22 PROCEDURE — 92526 ORAL FUNCTION THERAPY: CPT

## 2021-10-22 PROCEDURE — 85610 PROTHROMBIN TIME: CPT | Performed by: HOSPITALIST

## 2021-10-22 PROCEDURE — 99232 SBSQ HOSP IP/OBS MODERATE 35: CPT | Performed by: INTERNAL MEDICINE

## 2021-10-22 PROCEDURE — 84550 ASSAY OF BLOOD/URIC ACID: CPT | Performed by: HOSPITALIST

## 2021-10-22 PROCEDURE — 80048 BASIC METABOLIC PNL TOTAL CA: CPT | Performed by: HOSPITALIST

## 2021-10-22 RX ADMIN — IPRATROPIUM BROMIDE AND ALBUTEROL SULFATE 3 ML: 2.5; .5 SOLUTION RESPIRATORY (INHALATION) at 21:03

## 2021-10-22 RX ADMIN — IPRATROPIUM BROMIDE AND ALBUTEROL SULFATE 3 ML: 2.5; .5 SOLUTION RESPIRATORY (INHALATION) at 07:43

## 2021-10-22 RX ADMIN — METOPROLOL TARTRATE 12.5 MG: 25 TABLET, FILM COATED ORAL at 09:00

## 2021-10-22 RX ADMIN — GABAPENTIN 100 MG: 100 CAPSULE ORAL at 21:32

## 2021-10-22 RX ADMIN — IPRATROPIUM BROMIDE AND ALBUTEROL SULFATE 3 ML: 2.5; .5 SOLUTION RESPIRATORY (INHALATION) at 12:00

## 2021-10-22 RX ADMIN — ASPIRIN 81 MG: 81 TABLET, COATED ORAL at 09:00

## 2021-10-22 RX ADMIN — SODIUM CHLORIDE, PRESERVATIVE FREE 10 ML: 5 INJECTION INTRAVENOUS at 09:01

## 2021-10-22 RX ADMIN — SODIUM CHLORIDE, PRESERVATIVE FREE 10 ML: 5 INJECTION INTRAVENOUS at 21:33

## 2021-10-22 RX ADMIN — FINASTERIDE 5 MG: 5 TABLET, FILM COATED ORAL at 09:01

## 2021-10-22 RX ADMIN — METOPROLOL TARTRATE 12.5 MG: 25 TABLET, FILM COATED ORAL at 21:32

## 2021-10-22 RX ADMIN — PANTOPRAZOLE SODIUM 40 MG: 40 TABLET, DELAYED RELEASE ORAL at 06:12

## 2021-10-23 LAB
ANION GAP SERPL CALCULATED.3IONS-SCNC: 10.3 MMOL/L (ref 5–15)
BUN SERPL-MCNC: 57 MG/DL (ref 8–23)
BUN/CREAT SERPL: 53.8 (ref 7–25)
CALCIUM SPEC-SCNC: 8.9 MG/DL (ref 8.6–10.5)
CHLORIDE SERPL-SCNC: 109 MMOL/L (ref 98–107)
CO2 SERPL-SCNC: 22.7 MMOL/L (ref 22–29)
CREAT SERPL-MCNC: 1.06 MG/DL (ref 0.76–1.27)
GFR SERPL CREATININE-BSD FRML MDRD: 67 ML/MIN/1.73
GLUCOSE SERPL-MCNC: 120 MG/DL (ref 65–99)
INR PPP: 1.92 (ref 0.9–1.1)
POTASSIUM SERPL-SCNC: 4.8 MMOL/L (ref 3.5–5.2)
PROTHROMBIN TIME: 21.7 SECONDS (ref 11.7–14.2)
SODIUM SERPL-SCNC: 142 MMOL/L (ref 136–145)
URATE SERPL-MCNC: 11.2 MG/DL (ref 3.4–7)

## 2021-10-23 PROCEDURE — 94799 UNLISTED PULMONARY SVC/PX: CPT

## 2021-10-23 PROCEDURE — 84550 ASSAY OF BLOOD/URIC ACID: CPT | Performed by: HOSPITALIST

## 2021-10-23 PROCEDURE — 85610 PROTHROMBIN TIME: CPT | Performed by: HOSPITALIST

## 2021-10-23 PROCEDURE — 94760 N-INVAS EAR/PLS OXIMETRY 1: CPT

## 2021-10-23 PROCEDURE — 97110 THERAPEUTIC EXERCISES: CPT

## 2021-10-23 PROCEDURE — 80048 BASIC METABOLIC PNL TOTAL CA: CPT | Performed by: HOSPITALIST

## 2021-10-23 PROCEDURE — 99232 SBSQ HOSP IP/OBS MODERATE 35: CPT | Performed by: NURSE PRACTITIONER

## 2021-10-23 RX ADMIN — METOPROLOL TARTRATE 12.5 MG: 25 TABLET, FILM COATED ORAL at 08:36

## 2021-10-23 RX ADMIN — SODIUM CHLORIDE, PRESERVATIVE FREE 10 ML: 5 INJECTION INTRAVENOUS at 20:37

## 2021-10-23 RX ADMIN — METOPROLOL TARTRATE 12.5 MG: 25 TABLET, FILM COATED ORAL at 20:36

## 2021-10-23 RX ADMIN — ASPIRIN 81 MG: 81 TABLET, COATED ORAL at 08:36

## 2021-10-23 RX ADMIN — IPRATROPIUM BROMIDE AND ALBUTEROL SULFATE 3 ML: 2.5; .5 SOLUTION RESPIRATORY (INHALATION) at 07:07

## 2021-10-23 RX ADMIN — IPRATROPIUM BROMIDE AND ALBUTEROL SULFATE 3 ML: 2.5; .5 SOLUTION RESPIRATORY (INHALATION) at 21:33

## 2021-10-23 RX ADMIN — FINASTERIDE 5 MG: 5 TABLET, FILM COATED ORAL at 08:36

## 2021-10-23 RX ADMIN — SODIUM CHLORIDE, PRESERVATIVE FREE 10 ML: 5 INJECTION INTRAVENOUS at 08:38

## 2021-10-23 RX ADMIN — GABAPENTIN 100 MG: 100 CAPSULE ORAL at 20:36

## 2021-10-23 RX ADMIN — PANTOPRAZOLE SODIUM 40 MG: 40 TABLET, DELAYED RELEASE ORAL at 06:01

## 2021-10-23 RX ADMIN — IPRATROPIUM BROMIDE AND ALBUTEROL SULFATE 3 ML: 2.5; .5 SOLUTION RESPIRATORY (INHALATION) at 12:05

## 2021-10-24 PROCEDURE — 94799 UNLISTED PULMONARY SVC/PX: CPT

## 2021-10-24 PROCEDURE — 99232 SBSQ HOSP IP/OBS MODERATE 35: CPT | Performed by: NURSE PRACTITIONER

## 2021-10-24 RX ADMIN — METOPROLOL TARTRATE 12.5 MG: 25 TABLET, FILM COATED ORAL at 09:02

## 2021-10-24 RX ADMIN — FINASTERIDE 5 MG: 5 TABLET, FILM COATED ORAL at 09:02

## 2021-10-24 RX ADMIN — ACETAMINOPHEN 650 MG: 325 TABLET, FILM COATED ORAL at 13:14

## 2021-10-24 RX ADMIN — METOPROLOL TARTRATE 12.5 MG: 25 TABLET, FILM COATED ORAL at 20:00

## 2021-10-24 RX ADMIN — ASPIRIN 81 MG: 81 TABLET, COATED ORAL at 09:02

## 2021-10-24 RX ADMIN — SODIUM CHLORIDE, PRESERVATIVE FREE 10 ML: 5 INJECTION INTRAVENOUS at 09:06

## 2021-10-24 RX ADMIN — SENNOSIDES 2 TABLET: 8.6 TABLET, FILM COATED ORAL at 20:00

## 2021-10-24 RX ADMIN — SODIUM CHLORIDE, PRESERVATIVE FREE 10 ML: 5 INJECTION INTRAVENOUS at 20:05

## 2021-10-24 RX ADMIN — IPRATROPIUM BROMIDE AND ALBUTEROL SULFATE 3 ML: 2.5; .5 SOLUTION RESPIRATORY (INHALATION) at 12:24

## 2021-10-24 RX ADMIN — IPRATROPIUM BROMIDE AND ALBUTEROL SULFATE 3 ML: 2.5; .5 SOLUTION RESPIRATORY (INHALATION) at 07:12

## 2021-10-24 RX ADMIN — GABAPENTIN 100 MG: 100 CAPSULE ORAL at 20:00

## 2021-10-24 RX ADMIN — PANTOPRAZOLE SODIUM 40 MG: 40 TABLET, DELAYED RELEASE ORAL at 06:14

## 2021-10-24 RX ADMIN — IPRATROPIUM BROMIDE AND ALBUTEROL SULFATE 3 ML: 2.5; .5 SOLUTION RESPIRATORY (INHALATION) at 19:21

## 2021-10-25 LAB
DEPRECATED RDW RBC AUTO: 53.9 FL (ref 37–54)
ERYTHROCYTE [DISTWIDTH] IN BLOOD BY AUTOMATED COUNT: 17.1 % (ref 12.3–15.4)
HCT VFR BLD AUTO: 35.8 % (ref 37.5–51)
HGB BLD-MCNC: 11.1 G/DL (ref 13–17.7)
INR PPP: 1.85 (ref 0.9–1.1)
MCH RBC QN AUTO: 28.5 PG (ref 26.6–33)
MCHC RBC AUTO-ENTMCNC: 31 G/DL (ref 31.5–35.7)
MCV RBC AUTO: 91.8 FL (ref 79–97)
PLATELET # BLD AUTO: 202 10*3/MM3 (ref 140–450)
PMV BLD AUTO: 10.9 FL (ref 6–12)
PROTHROMBIN TIME: 21.1 SECONDS (ref 11.7–14.2)
RBC # BLD AUTO: 3.9 10*6/MM3 (ref 4.14–5.8)
WBC # BLD AUTO: 11.62 10*3/MM3 (ref 3.4–10.8)

## 2021-10-25 PROCEDURE — 94799 UNLISTED PULMONARY SVC/PX: CPT

## 2021-10-25 PROCEDURE — 97535 SELF CARE MNGMENT TRAINING: CPT

## 2021-10-25 PROCEDURE — 99232 SBSQ HOSP IP/OBS MODERATE 35: CPT | Performed by: INTERNAL MEDICINE

## 2021-10-25 PROCEDURE — 85027 COMPLETE CBC AUTOMATED: CPT | Performed by: HOSPITALIST

## 2021-10-25 PROCEDURE — 97110 THERAPEUTIC EXERCISES: CPT

## 2021-10-25 PROCEDURE — 94760 N-INVAS EAR/PLS OXIMETRY 1: CPT

## 2021-10-25 PROCEDURE — 99232 SBSQ HOSP IP/OBS MODERATE 35: CPT | Performed by: NURSE PRACTITIONER

## 2021-10-25 PROCEDURE — 85610 PROTHROMBIN TIME: CPT | Performed by: HOSPITALIST

## 2021-10-25 RX ADMIN — GABAPENTIN 100 MG: 100 CAPSULE ORAL at 20:32

## 2021-10-25 RX ADMIN — PANTOPRAZOLE SODIUM 40 MG: 40 TABLET, DELAYED RELEASE ORAL at 06:19

## 2021-10-25 RX ADMIN — SODIUM CHLORIDE, PRESERVATIVE FREE 10 ML: 5 INJECTION INTRAVENOUS at 20:33

## 2021-10-25 RX ADMIN — FINASTERIDE 5 MG: 5 TABLET, FILM COATED ORAL at 09:49

## 2021-10-25 RX ADMIN — ASPIRIN 81 MG: 81 TABLET, COATED ORAL at 09:49

## 2021-10-25 RX ADMIN — METOPROLOL TARTRATE 12.5 MG: 25 TABLET, FILM COATED ORAL at 09:50

## 2021-10-25 RX ADMIN — METOPROLOL TARTRATE 12.5 MG: 25 TABLET, FILM COATED ORAL at 20:32

## 2021-10-25 RX ADMIN — IPRATROPIUM BROMIDE AND ALBUTEROL SULFATE 3 ML: 2.5; .5 SOLUTION RESPIRATORY (INHALATION) at 12:52

## 2021-10-25 RX ADMIN — IPRATROPIUM BROMIDE AND ALBUTEROL SULFATE 3 ML: 2.5; .5 SOLUTION RESPIRATORY (INHALATION) at 19:31

## 2021-10-25 RX ADMIN — IPRATROPIUM BROMIDE AND ALBUTEROL SULFATE 3 ML: 2.5; .5 SOLUTION RESPIRATORY (INHALATION) at 07:44

## 2021-10-26 ENCOUNTER — APPOINTMENT (OUTPATIENT)
Dept: GENERAL RADIOLOGY | Facility: HOSPITAL | Age: 81
End: 2021-10-26

## 2021-10-26 ENCOUNTER — APPOINTMENT (OUTPATIENT)
Dept: ULTRASOUND IMAGING | Facility: HOSPITAL | Age: 81
End: 2021-10-26

## 2021-10-26 LAB
ALBUMIN SERPL-MCNC: 4 G/DL (ref 3.5–5.2)
AMYLASE FLD-CCNC: 14 U/L
ANION GAP SERPL CALCULATED.3IONS-SCNC: 13.6 MMOL/L (ref 5–15)
ANION GAP SERPL CALCULATED.3IONS-SCNC: 18.9 MMOL/L (ref 5–15)
APPEARANCE FLD: CLEAR
BUN SERPL-MCNC: 73 MG/DL (ref 8–23)
BUN SERPL-MCNC: 75 MG/DL (ref 8–23)
BUN/CREAT SERPL: 56.8 (ref 7–25)
BUN/CREAT SERPL: 63.5 (ref 7–25)
CALCIUM SPEC-SCNC: 8.7 MG/DL (ref 8.6–10.5)
CALCIUM SPEC-SCNC: 8.9 MG/DL (ref 8.6–10.5)
CHLORIDE SERPL-SCNC: 105 MMOL/L (ref 98–107)
CHLORIDE SERPL-SCNC: 107 MMOL/L (ref 98–107)
CO2 SERPL-SCNC: 15.1 MMOL/L (ref 22–29)
CO2 SERPL-SCNC: 19.4 MMOL/L (ref 22–29)
COLOR FLD: YELLOW
CREAT SERPL-MCNC: 1.15 MG/DL (ref 0.76–1.27)
CREAT SERPL-MCNC: 1.32 MG/DL (ref 0.76–1.27)
DEPRECATED RDW RBC AUTO: 57.1 FL (ref 37–54)
ERYTHROCYTE [DISTWIDTH] IN BLOOD BY AUTOMATED COUNT: 17.4 % (ref 12.3–15.4)
GFR SERPL CREATININE-BSD FRML MDRD: 52 ML/MIN/1.73
GFR SERPL CREATININE-BSD FRML MDRD: 61 ML/MIN/1.73
GLUCOSE FLD-MCNC: 112 MG/DL
GLUCOSE SERPL-MCNC: 114 MG/DL (ref 65–99)
GLUCOSE SERPL-MCNC: 99 MG/DL (ref 65–99)
HCT VFR BLD AUTO: 36.9 % (ref 37.5–51)
HGB BLD-MCNC: 11.3 G/DL (ref 13–17.7)
INR PPP: 1.85 (ref 0.9–1.1)
LDH FLD-CCNC: 103 U/L
LDH SERPL-CCNC: 667 U/L (ref 135–225)
LYMPHOCYTES NFR FLD MANUAL: 36 %
MCH RBC QN AUTO: 28.4 PG (ref 26.6–33)
MCHC RBC AUTO-ENTMCNC: 30.6 G/DL (ref 31.5–35.7)
MCV RBC AUTO: 92.7 FL (ref 79–97)
METHOD: NORMAL
MONOCYTES NFR FLD: 2 %
MONOS+MACROS NFR FLD: 23 %
NEUTROPHILS NFR FLD MANUAL: 39 %
NUC CELL # FLD: 128 /MM3
PHOSPHATE SERPL-MCNC: 4.8 MG/DL (ref 2.5–4.5)
PLATELET # BLD AUTO: 147 10*3/MM3 (ref 140–450)
PMV BLD AUTO: 11.3 FL (ref 6–12)
POTASSIUM SERPL-SCNC: 4.9 MMOL/L (ref 3.5–5.2)
POTASSIUM SERPL-SCNC: 5.1 MMOL/L (ref 3.5–5.2)
PROT FLD-MCNC: 1.3 G/DL
PROT SERPL-MCNC: 6 G/DL (ref 6–8.5)
PROTHROMBIN TIME: 21.1 SECONDS (ref 11.7–14.2)
RBC # BLD AUTO: 3.98 10*6/MM3 (ref 4.14–5.8)
RBC # FLD AUTO: 689 /MM3
SODIUM SERPL-SCNC: 139 MMOL/L (ref 136–145)
SODIUM SERPL-SCNC: 140 MMOL/L (ref 136–145)
WBC # BLD AUTO: 12.09 10*3/MM3 (ref 3.4–10.8)

## 2021-10-26 PROCEDURE — 71046 X-RAY EXAM CHEST 2 VIEWS: CPT

## 2021-10-26 PROCEDURE — 83615 LACTATE (LD) (LDH) ENZYME: CPT | Performed by: INTERNAL MEDICINE

## 2021-10-26 PROCEDURE — 80069 RENAL FUNCTION PANEL: CPT | Performed by: INTERNAL MEDICINE

## 2021-10-26 PROCEDURE — 0W993ZZ DRAINAGE OF RIGHT PLEURAL CAVITY, PERCUTANEOUS APPROACH: ICD-10-PCS | Performed by: RADIOLOGY

## 2021-10-26 PROCEDURE — 82150 ASSAY OF AMYLASE: CPT | Performed by: INTERNAL MEDICINE

## 2021-10-26 PROCEDURE — 87070 CULTURE OTHR SPECIMN AEROBIC: CPT | Performed by: INTERNAL MEDICINE

## 2021-10-26 PROCEDURE — 80048 BASIC METABOLIC PNL TOTAL CA: CPT | Performed by: INTERNAL MEDICINE

## 2021-10-26 PROCEDURE — 88305 TISSUE EXAM BY PATHOLOGIST: CPT | Performed by: INTERNAL MEDICINE

## 2021-10-26 PROCEDURE — 82945 GLUCOSE OTHER FLUID: CPT | Performed by: INTERNAL MEDICINE

## 2021-10-26 PROCEDURE — 89051 BODY FLUID CELL COUNT: CPT | Performed by: INTERNAL MEDICINE

## 2021-10-26 PROCEDURE — 87205 SMEAR GRAM STAIN: CPT | Performed by: INTERNAL MEDICINE

## 2021-10-26 PROCEDURE — 99233 SBSQ HOSP IP/OBS HIGH 50: CPT | Performed by: INTERNAL MEDICINE

## 2021-10-26 PROCEDURE — 94799 UNLISTED PULMONARY SVC/PX: CPT

## 2021-10-26 PROCEDURE — 85610 PROTHROMBIN TIME: CPT | Performed by: INTERNAL MEDICINE

## 2021-10-26 PROCEDURE — 88112 CYTOPATH CELL ENHANCE TECH: CPT | Performed by: INTERNAL MEDICINE

## 2021-10-26 PROCEDURE — 85027 COMPLETE CBC AUTOMATED: CPT | Performed by: INTERNAL MEDICINE

## 2021-10-26 PROCEDURE — 76942 ECHO GUIDE FOR BIOPSY: CPT

## 2021-10-26 PROCEDURE — 0 LIDOCAINE 1 % SOLUTION: Performed by: RADIOLOGY

## 2021-10-26 PROCEDURE — 83986 ASSAY PH BODY FLUID NOS: CPT | Performed by: INTERNAL MEDICINE

## 2021-10-26 PROCEDURE — 84155 ASSAY OF PROTEIN SERUM: CPT | Performed by: INTERNAL MEDICINE

## 2021-10-26 PROCEDURE — 97110 THERAPEUTIC EXERCISES: CPT

## 2021-10-26 PROCEDURE — 87015 SPECIMEN INFECT AGNT CONCNTJ: CPT | Performed by: INTERNAL MEDICINE

## 2021-10-26 PROCEDURE — 84157 ASSAY OF PROTEIN OTHER: CPT | Performed by: INTERNAL MEDICINE

## 2021-10-26 RX ORDER — SODIUM BICARBONATE IN D5W 150/1000ML
150 PLASTIC BAG, INJECTION (ML) INTRAVENOUS CONTINUOUS
Status: DISCONTINUED | OUTPATIENT
Start: 2021-10-26 | End: 2021-10-26

## 2021-10-26 RX ORDER — LIDOCAINE HYDROCHLORIDE 10 MG/ML
10 INJECTION, SOLUTION INFILTRATION; PERINEURAL ONCE
Status: COMPLETED | OUTPATIENT
Start: 2021-10-26 | End: 2021-10-26

## 2021-10-26 RX ORDER — WARFARIN SODIUM 5 MG/1
5 TABLET ORAL
Status: DISCONTINUED | OUTPATIENT
Start: 2021-10-26 | End: 2021-10-27 | Stop reason: HOSPADM

## 2021-10-26 RX ADMIN — IPRATROPIUM BROMIDE AND ALBUTEROL SULFATE 3 ML: 2.5; .5 SOLUTION RESPIRATORY (INHALATION) at 19:43

## 2021-10-26 RX ADMIN — HYDROCODONE BITARTRATE AND ACETAMINOPHEN 1 TABLET: 5; 325 TABLET ORAL at 13:18

## 2021-10-26 RX ADMIN — LIDOCAINE HYDROCHLORIDE 5 ML: 10 INJECTION, SOLUTION INFILTRATION; PERINEURAL at 11:00

## 2021-10-26 RX ADMIN — SENNOSIDES 2 TABLET: 8.6 TABLET, FILM COATED ORAL at 20:50

## 2021-10-26 RX ADMIN — PANTOPRAZOLE SODIUM 40 MG: 40 TABLET, DELAYED RELEASE ORAL at 06:22

## 2021-10-26 RX ADMIN — SODIUM CHLORIDE, PRESERVATIVE FREE 10 ML: 5 INJECTION INTRAVENOUS at 20:51

## 2021-10-26 RX ADMIN — IPRATROPIUM BROMIDE AND ALBUTEROL SULFATE 3 ML: 2.5; .5 SOLUTION RESPIRATORY (INHALATION) at 07:47

## 2021-10-26 RX ADMIN — Medication 150 MEQ: at 12:41

## 2021-10-26 RX ADMIN — ASPIRIN 81 MG: 81 TABLET, COATED ORAL at 08:27

## 2021-10-26 RX ADMIN — SODIUM CHLORIDE, PRESERVATIVE FREE 10 ML: 5 INJECTION INTRAVENOUS at 08:28

## 2021-10-26 RX ADMIN — FINASTERIDE 5 MG: 5 TABLET, FILM COATED ORAL at 08:27

## 2021-10-26 RX ADMIN — METOPROLOL TARTRATE 12.5 MG: 25 TABLET, FILM COATED ORAL at 20:50

## 2021-10-26 RX ADMIN — GABAPENTIN 100 MG: 100 CAPSULE ORAL at 20:50

## 2021-10-26 RX ADMIN — IPRATROPIUM BROMIDE AND ALBUTEROL SULFATE 3 ML: 2.5; .5 SOLUTION RESPIRATORY (INHALATION) at 12:58

## 2021-10-26 RX ADMIN — METOPROLOL TARTRATE 12.5 MG: 25 TABLET, FILM COATED ORAL at 08:27

## 2021-10-27 VITALS
TEMPERATURE: 97.7 F | RESPIRATION RATE: 18 BRPM | HEIGHT: 71 IN | OXYGEN SATURATION: 98 % | WEIGHT: 146.83 LBS | BODY MASS INDEX: 20.56 KG/M2 | SYSTOLIC BLOOD PRESSURE: 105 MMHG | HEART RATE: 76 BPM | DIASTOLIC BLOOD PRESSURE: 51 MMHG

## 2021-10-27 LAB
CYTO UR: NORMAL
INR PPP: 1.72 (ref 0.9–1.1)
LAB AP CASE REPORT: NORMAL
PATH REPORT.FINAL DX SPEC: NORMAL
PATH REPORT.GROSS SPEC: NORMAL
PROTHROMBIN TIME: 19.9 SECONDS (ref 11.7–14.2)

## 2021-10-27 PROCEDURE — 94799 UNLISTED PULMONARY SVC/PX: CPT

## 2021-10-27 PROCEDURE — 85610 PROTHROMBIN TIME: CPT | Performed by: INTERNAL MEDICINE

## 2021-10-27 PROCEDURE — 99232 SBSQ HOSP IP/OBS MODERATE 35: CPT | Performed by: INTERNAL MEDICINE

## 2021-10-27 RX ORDER — GABAPENTIN 100 MG/1
100 CAPSULE ORAL NIGHTLY
Start: 2021-10-27

## 2021-10-27 RX ORDER — ONDANSETRON 4 MG/1
4 TABLET, FILM COATED ORAL EVERY 8 HOURS PRN
Qty: 12 TABLET | Refills: 0 | Status: SHIPPED | OUTPATIENT
Start: 2021-10-27

## 2021-10-27 RX ORDER — ACETAMINOPHEN 325 MG/1
650 TABLET ORAL EVERY 4 HOURS PRN
Start: 2021-10-27

## 2021-10-27 RX ADMIN — METOPROLOL TARTRATE 12.5 MG: 25 TABLET, FILM COATED ORAL at 08:54

## 2021-10-27 RX ADMIN — ASPIRIN 81 MG: 81 TABLET, COATED ORAL at 08:54

## 2021-10-27 RX ADMIN — IPRATROPIUM BROMIDE AND ALBUTEROL SULFATE 3 ML: 2.5; .5 SOLUTION RESPIRATORY (INHALATION) at 07:01

## 2021-10-27 RX ADMIN — SODIUM CHLORIDE, PRESERVATIVE FREE 10 ML: 5 INJECTION INTRAVENOUS at 08:56

## 2021-10-27 RX ADMIN — FINASTERIDE 5 MG: 5 TABLET, FILM COATED ORAL at 08:54

## 2021-10-27 RX ADMIN — PANTOPRAZOLE SODIUM 40 MG: 40 TABLET, DELAYED RELEASE ORAL at 06:29

## 2021-10-28 ENCOUNTER — READMISSION MANAGEMENT (OUTPATIENT)
Dept: CALL CENTER | Facility: HOSPITAL | Age: 81
End: 2021-10-28

## 2021-10-28 NOTE — OUTREACH NOTE
Prep Survey      Responses   [facility] facility patient discharged from? [facility]   Is LACE score < 7 ? No   Emergency Room discharge w/ pulse ox? No   Eligibility Not Eligible   What are the reasons patient is not eligible? Hospice/Pallative Care   Does the patient have one of the following disease processes/diagnoses(primary or secondary)? CHF   Prep survey completed? Yes          Olga Burt RN

## 2021-10-31 LAB
BACTERIA FLD CULT: NORMAL
GRAM STN SPEC: NORMAL
GRAM STN SPEC: NORMAL

## 2021-11-03 LAB — PH FLD: 7.49 [PH]

## (undated) DEVICE — Device

## (undated) DEVICE — CATH DIAG IMPULSE FL4 5F 100CM

## (undated) DEVICE — CATH DIAG IMPULSE AR2 5F 100CM

## (undated) DEVICE — Device: Brand: DEFENDO AIR/WATER/SUCTION AND BIOPSY VALVE

## (undated) DEVICE — CATH DIAG IMPULSE PIG 5F 100CM

## (undated) DEVICE — GW EMR FIX EXCHG J STD .035 3MM 260CM

## (undated) DEVICE — TUBING, SUCTION, 1/4" X 10', STRAIGHT: Brand: MEDLINE

## (undated) DEVICE — CANN NASL CO2 TRULINK W/O2 A/

## (undated) DEVICE — CATH DIAG IMPULSE FR4 5F 100CM

## (undated) DEVICE — GLIDESHEATH SLENDER STAINLESS STEEL KIT: Brand: GLIDESHEATH SLENDER

## (undated) DEVICE — PK CATH CARD 40

## (undated) DEVICE — THE TORRENT IRRIGATION SCOPE CONNECTOR IS USED WITH THE TORRENT IRRIGATION TUBING TO PROVIDE IRRIGATION FLUIDS SUCH AS STERILE WATER DURING GASTROINTESTINAL ENDOSCOPIC PROCEDURES WHEN USED IN CONJUNCTION WITH AN IRRIGATION PUMP (OR ELECTROSURGICAL UNIT).: Brand: TORRENT

## (undated) DEVICE — CATH DIAG IMPULSE FL5 5F 100CM

## (undated) DEVICE — CATH DIAG IMPULSE AL2 5F 100CM

## (undated) DEVICE — TBG 02 CRUSH RESIST LF CLR 7FT

## (undated) DEVICE — KT MANIFLD CARDIAC

## (undated) DEVICE — CATH DIAG IMPULSE AL1 5F 100CM